# Patient Record
Sex: FEMALE | Race: WHITE | NOT HISPANIC OR LATINO | Employment: OTHER | URBAN - METROPOLITAN AREA
[De-identification: names, ages, dates, MRNs, and addresses within clinical notes are randomized per-mention and may not be internally consistent; named-entity substitution may affect disease eponyms.]

---

## 2017-10-04 ENCOUNTER — OFFICE VISIT (OUTPATIENT)
Dept: OBSTETRICS AND GYNECOLOGY | Facility: CLINIC | Age: 62
End: 2017-10-04
Attending: OBSTETRICS & GYNECOLOGY
Payer: COMMERCIAL

## 2017-10-04 VITALS — WEIGHT: 238.13 LBS | BODY MASS INDEX: 42.19 KG/M2 | HEIGHT: 63 IN

## 2017-10-04 DIAGNOSIS — Z01.419 ENCOUNTER FOR GYNECOLOGICAL EXAMINATION: ICD-10-CM

## 2017-10-04 DIAGNOSIS — Z12.39 BREAST CANCER SCREENING: Primary | ICD-10-CM

## 2017-10-04 DIAGNOSIS — N81.4 UTERINE PROLAPSE: ICD-10-CM

## 2017-10-04 DIAGNOSIS — N81.11 CYSTOCELE, MIDLINE: ICD-10-CM

## 2017-10-04 PROCEDURE — 99396 PREV VISIT EST AGE 40-64: CPT | Mod: S$GLB,,, | Performed by: OBSTETRICS & GYNECOLOGY

## 2017-10-04 PROCEDURE — 99999 PR PBB SHADOW E&M-EST. PATIENT-LVL III: CPT | Mod: PBBFAC,,, | Performed by: OBSTETRICS & GYNECOLOGY

## 2017-10-04 RX ORDER — MESALAMINE 1.2 G/1
2.4 TABLET, DELAYED RELEASE ORAL
COMMUNITY
Start: 2017-09-30

## 2017-10-04 RX ORDER — PRAVASTATIN SODIUM 20 MG/1
40 TABLET ORAL NIGHTLY
COMMUNITY
Start: 2017-09-30 | End: 2019-03-27

## 2017-10-04 RX ORDER — OMEPRAZOLE 20 MG/1
20 CAPSULE, DELAYED RELEASE ORAL DAILY
COMMUNITY
Start: 2017-09-30 | End: 2019-10-10

## 2017-10-08 PROBLEM — N81.11 CYSTOCELE, MIDLINE: Status: ACTIVE | Noted: 2017-10-08

## 2017-10-08 NOTE — PROGRESS NOTES
"CC: Well woman exam    Jenny Caro is a 62 y.o. female  presents for a well woman exam.  She is established.  LMP: No LMP recorded. Patient is postmenopausal..      Annual Exam, she reports seeing Dr. Chapman last year for surgical evaluation of uterovaginal prolapse with a cystocele.  Adela fully evaluated the patient with urodynamics.  He states that she is not a surgical candidate.  Patient states that she has not gotten any worse over the past year and for now we'll just continue to observe.     last pap  pap & hpv negative,   Last mammo  [facility] went back for u/s- benign  Last colonoscopy 4 years ago normal    Health Maintenance   Topic Date Due    Hepatitis C Screening  1955    Lipid Panel  1955    TETANUS VACCINE  1972    Mammogram  1994    Colonoscopy  2004    Zoster Vaccine  2014    Influenza Vaccine  2017    Pap Smear with HPV Cotest  2019         Past Medical History:   Diagnosis Date    Abnormal Pap smear of cervix     LEEP , mild dysplasia normal paps since    GERD (gastroesophageal reflux disease)     HPV in female     Hypertension     Ulcerative colitis        Past Surgical History:   Procedure Laterality Date    gastric sleeve  2015    LAPAROSCOPIC GASTRIC BANDING         OB History    Para Term  AB Living   2 2 2     2   SAB TAB Ectopic Multiple Live Births           2      # Outcome Date GA Lbr Guero/2nd Weight Sex Delivery Anes PTL Lv   2 Term      Vag-Spont      1 Term      Vag-Spont             Family History   Problem Relation Age of Onset    Diabetes Mother     Heart disease Father     Stroke Sister     Diabetes Sister        Social History   Substance Use Topics    Smoking status: Never Smoker    Smokeless tobacco: Never Used    Alcohol use No       Ht 5' 3" (1.6 m)   Wt 108 kg (238 lb 1.6 oz)   BMI 42.18 kg/m²       ROS:  GENERAL: Denies weight gain or weight loss. Feeling " well overall.   SKIN: Denies rash or lesions.   HEAD: Denies head injury or headache.   NODES: Denies enlarged lymph nodes.   CHEST: Denies chest pain or shortness of breath.   CARDIOVASCULAR: Denies palpitations or left sided chest pain.   ABDOMEN: No abdominal pain, constipation, diarrhea, nausea, vomiting or rectal bleeding.   URINARY: No frequency, dysuria, hematuria, or burning on urination.  PSYCHIATRIC: Denies depression, anxiety or mood swings.    Physical Exam:    APPEARANCE: Well nourished, well developed, in no acute distress.  AFFECT: WNL, alert and oriented x 3  SKIN: No acne or hirsutism  ABDOMEN: Soft.  No tenderness or masses.  No hepatosplenomegaly.  No hernias.  BREASTS: Symmetrical, no skin changes or visible lesions.  No palpable masses, nipple discharge bilaterally in left breast.    Right breast with palp 1cm cyst/ fibroadenoma seen on u/s. No LAD nor d/c.  PELVIC: Normal external genitalia without lesions.  Normal hair distribution.  Adequate perineal body, normal urethral meatus.     Vagina moist and well rugated without lesions or discharge.  Cervix pink, without lesions, discharge or tenderness.  Patient continues to have uterine prolapse with a cystocele.  Unchanged   from last year.    Bimanual exam shows uterus to be normal size, regular, mobile and nontender.  Adnexa without masses or tenderness.    EXTREMITIES: No edema.    ASSESSMENT AND PLAN  1. Breast cancer screening  Mammo Digital Screening Bilat with Tomosynthesis CAD    Mammo Digital Screening Bilat with Tomosynthesis CAD   2. Encounter for gynecological examination         Patient was counseled today on A.C.S. Pap guidelines and recommendations for yearly pelvic exams, mammograms and monthly self breast exams; to see her PCP for other health maintenance.     Return in about 1 year (around 10/4/2018).

## 2017-12-09 ENCOUNTER — OFFICE VISIT (OUTPATIENT)
Dept: URGENT CARE | Facility: CLINIC | Age: 62
End: 2017-12-09
Payer: COMMERCIAL

## 2017-12-09 VITALS
TEMPERATURE: 97 F | HEIGHT: 63 IN | HEART RATE: 77 BPM | RESPIRATION RATE: 16 BRPM | WEIGHT: 238 LBS | SYSTOLIC BLOOD PRESSURE: 134 MMHG | OXYGEN SATURATION: 97 % | DIASTOLIC BLOOD PRESSURE: 83 MMHG | BODY MASS INDEX: 42.17 KG/M2

## 2017-12-09 DIAGNOSIS — N39.0 URINARY TRACT INFECTION WITHOUT HEMATURIA, SITE UNSPECIFIED: Primary | ICD-10-CM

## 2017-12-09 DIAGNOSIS — R30.0 DYSURIA: ICD-10-CM

## 2017-12-09 LAB
BILIRUB UR QL STRIP: NEGATIVE
GLUCOSE UR QL STRIP: NEGATIVE
KETONES UR QL STRIP: NEGATIVE
LEUKOCYTE ESTERASE UR QL STRIP: POSITIVE
PH, POC UA: 5.5 (ref 5–8)
POC BLOOD, URINE: NEGATIVE
POC NITRATES, URINE: NEGATIVE
PROT UR QL STRIP: NEGATIVE
SP GR UR STRIP: 1.01 (ref 1–1.03)
UROBILINOGEN UR STRIP-ACNC: ABNORMAL (ref 0.1–1.1)

## 2017-12-09 PROCEDURE — 99203 OFFICE O/P NEW LOW 30 MIN: CPT | Mod: 25,S$GLB,, | Performed by: EMERGENCY MEDICINE

## 2017-12-09 PROCEDURE — 81003 URINALYSIS AUTO W/O SCOPE: CPT | Mod: QW,S$GLB,, | Performed by: EMERGENCY MEDICINE

## 2017-12-09 RX ORDER — NITROFURANTOIN 25; 75 MG/1; MG/1
100 CAPSULE ORAL 2 TIMES DAILY
Qty: 14 CAPSULE | Refills: 0 | Status: SHIPPED | OUTPATIENT
Start: 2017-12-09 | End: 2017-12-16

## 2017-12-09 NOTE — PROGRESS NOTES
Subjective:       Patient ID: Jenny Caro is a 62 y.o. female.    Vitals:    12/09/17 0947   BP: 134/83   Pulse: 77   Resp: 16   Temp: 97.2 °F (36.2 °C)       Chief Complaint: Urinary Tract Infection    Pt states urinary frequency, urgency, and burning x apprx 4 days. Pt is taking AZO.she has had UTIs in the past. She thought it may due to drinking too much diet coke but the sxs have persisted after she dropped drinking the diet coke. No fever No back pain      Urinary Tract Infection    This is a new problem. The current episode started in the past 7 days. The problem has been gradually improving. The patient is experiencing no pain. There has been no fever. Associated symptoms include frequency and urgency. Pertinent negatives include no chills, hematuria, nausea or vomiting. Treatments tried: AZO. The treatment provided mild relief. Her past medical history is significant for recurrent UTIs.     Review of Systems   Constitution: Negative for chills and fever.   Skin: Negative for itching.   Musculoskeletal: Negative for back pain.   Gastrointestinal: Negative for abdominal pain, nausea and vomiting.   Genitourinary: Positive for dysuria, frequency and urgency. Negative for genital sores, hematuria, missed menses and non-menstrual bleeding.       Objective:      Physical Exam   Constitutional: She is oriented to person, place, and time. She appears well-developed and well-nourished.   Morbid obesity   HENT:   Head: Normocephalic and atraumatic.   Right Ear: External ear normal.   Left Ear: External ear normal.   Nose: Nose normal. No nasal deformity. No epistaxis.   Mouth/Throat: Oropharynx is clear and moist and mucous membranes are normal.   Eyes: Conjunctivae, EOM and lids are normal. Pupils are equal, round, and reactive to light.   Neck: Trachea normal, normal range of motion and phonation normal. Neck supple.   Cardiovascular: Normal rate, regular rhythm, normal heart sounds and normal pulses.   "  Pulmonary/Chest: Effort normal and breath sounds normal.   Abdominal: Soft. Normal appearance and bowel sounds are normal. She exhibits no distension and no mass. There is no tenderness. There is no CVA tenderness.   Neurological: She is alert and oriented to person, place, and time.   Skin: Skin is warm, dry and intact.   Psychiatric: She has a normal mood and affect. Her speech is normal and behavior is normal. Cognition and memory are normal.   Nursing note and vitals reviewed.    /83   Pulse 77   Temp 97.2 °F (36.2 °C)   Resp 16   Ht 5' 3" (1.6 m)   Wt 108 kg (238 lb)   SpO2 97%   BMI 42.16 kg/m²    Office Visit on 12/09/2017   Component Date Value Ref Range Status    POC Blood, Urine 12/09/2017 Negative  Negative Final    POC Bilirubin, Urine 12/09/2017 Negative  Negative Final    POC Urobilinogen, Urine 12/09/2017 norm  0.1 - 1.1 Final    POC Ketones, Urine 12/09/2017 Negative  Negative Final    POC Protein, Urine 12/09/2017 Negative  Negative Final    POC Nitrates, Urine 12/09/2017 Negative  Negative Final    POC Glucose, Urine 12/09/2017 Negative  Negative Final    pH, UA 12/09/2017 5.5  5 - 8 Final    POC Specific Gravity, Urine 12/09/2017 1.015  1.003 - 1.029 Final    POC Leukocytes, Urine 12/09/2017 Positive* Negative Final     Assessment:       1. Urinary tract infection without hematuria, site unspecified    2. Dysuria        Plan:       Jenny was seen today for urinary tract infection.    Diagnoses and all orders for this visit:    Urinary tract infection without hematuria, site unspecified  -     Urine culture    Dysuria  -     POCT Urinalysis, Dipstick, Automated, W/O Scope    Other orders  -     nitrofurantoin, macrocrystal-monohydrate, (MACROBID) 100 MG capsule; Take 1 capsule (100 mg total) by mouth 2 (two) times daily.          "

## 2017-12-09 NOTE — PATIENT INSTRUCTIONS
"                                                                    UTI   If your condition worsens or fails to improve we recommend that you receive another evaluation at the ER immediately or contact your PCP to discuss your concerns or return here. You must understand that you've received an urgent care treatment only and that you may be released before all your medical problems are known or treated. You the patient will arrange for followup care as instructed.   If you had cultures done it will take 3-5 days to result. We will call you with the result.   If you are are female and on BCP use additional methods to prevent pregnancy while on the antibiotics and for one cycle after.   Cranberry juice may help. Get the 100% cranberry juice and mix 4 oz of juice with 4 oz of water and drink this 8 oz glass of liquid once a day.     Bladder Infection, Female (Adult)    Urine is normally doesn't have any bacteria in it. But bacteria can get into the urinary tract from the skin around the rectum. Or they can travel in the blood from elsewhere in the body. Once they are in your urinary tract, they can cause infection in the urethra (urethritis), the bladder (cystitis), or the kidneys (pyelonephritis).  The most common place for an infection is in the bladder. This is called a bladder infection. This is one of the most common infections in women. Most bladder infections are easily treated. They are not serious unless the infection spreads to the kidney.  The phrases "bladder infection," "UTI," and "cystitis" are often used to describe the same thing. But they are not always the same. Cystitis is an inflammation of the bladder. The most common cause of cystitis is an infection.  Symptoms  The infection causes inflammation in the urethra and bladder. This causes many of the symptoms. The most common symptoms of a bladder infection are:  · Pain or burning when urinating  · Having to urinate more often than usual  · Urgent " need to urinate  · Only a small amount of urine comes out  · Blood in urine  · Abdominal discomfort. This is usually in the lower abdomen above the pubic bone.  · Cloudy urine  · Strong- or bad-smelling urine  · Unable to urinate (urinary retention)  · Unable to hold urine in (urinary incontinence)  · Fever  · Loss of appetite  · Confusion (in older adults)  Causes  Bladder infections are not contagious. You can't get one from someone else, from a toilet seat, or from sharing a bath.  The most common cause of bladder infections is bacteria from the bowels. The bacteria get onto the skin around the opening of the urethra. From there, they can get into the urine and travel up to the bladder, causing inflammation and infection. This usually happens because of:  · Wiping improperly after urinating. Always wipe from front to back.  · Bowel incontinence  · Pregnancy  · Procedures such as having a catheter inserted  · Older age  · Not emptying your bladder. This can allow bacteria a chance to grow in your urine.  · Dehydration  · Constipation  · Sex  · Use of a diaphragm for birth control   Treatment  Bladder infections are diagnosed by a urine test. They are treated with antibiotics and usually clear up quickly without complications. Treatment helps prevent a more serious kidney infection.  Medicines  Medicines can help in the treatment of a bladder infection:  · Take antibiotics until they are used up, even if you feel better. It is important to finish them to make sure the infection has cleared.  · You can use acetaminophen or ibuprofen for pain, fever, or discomfort, unless another medicine was prescribed. If you have chronic liver or kidney disease, talk with your healthcare provider before using these medicines. Also talk with your provider if you've ever had a stomach ulcer or gastrointestinal bleeding, or are taking blood-thinner medicines.  · If you are given phenazopydridine to reduce burning with urination, it  will cause your urine to become a bright orange color. This can stain clothing.  Care and prevention  These self-care steps can help prevent future infections:  · Drink plenty of fluids to prevent dehydration and flush out your bladder. Do this unless you must restrict fluids for other health reasons, or your doctor told you not to.  · Proper cleaning after going to the bathroom is important. Wipe from front to back after using the toilet to prevent the spread of bacteria.  · Urinate more often. Don't try to hold urine in for a long time.  · Wear loose-fitting clothes and cotton underwear. Avoid tight-fitting pants.  · Improve your diet and prevent constipation. Eat more fresh fruit and vegetables, and fiber, and less junk and fatty foods.  · Avoid sex until your symptoms are gone.  · Avoid caffeine, alcohol, and spicy foods. These can irritate your bladder.  · Urinate right after intercourse to flush out your bladder.  · If you use birth control pills and have frequent bladder infections, discuss it with your doctor.  Follow-up care  Call your healthcare provider if all symptoms are not gone after 3 days of treatment. This is especially important if you have repeat infections.  If a culture was done, you will be told if your treatment needs to be changed. If directed, you can call to find out the results.  If X-rays were done, you will be told if the results will affect your treatment.  Call 911  Call 911 if any of the following occur:  · Trouble breathing  · Hard to wake up or confusion  · Fainting or loss of consciousness  · Rapid heart rate  When to seek medical advice  Call your healthcare provider right away if any of these occur:  · Fever of 100.4ºF (38.0ºC) or higher, or as directed by your healthcare provider  · Symptoms are not better by the third day of treatment  · Back or belly (abdominal) pain that gets worse  · Repeated vomiting, or unable to keep medicine down  · Weakness or dizziness  · Vaginal  discharge  · Pain, redness, or swelling in the outer vaginal area (labia)  Date Last Reviewed: 10/1/2016  © 4720-7183 [employer]. [address] [place] [address], [place] [address]. All rights reserved. This information is not intended as a substitute for professional medical care. Always follow your healthcare professional's instructions.

## 2017-12-13 LAB
BACTERIA UR CULT: ABNORMAL
BACTERIA UR CULT: ABNORMAL
OTHER ANTIBIOTIC SUSC ISLT: ABNORMAL

## 2017-12-15 ENCOUNTER — TELEPHONE (OUTPATIENT)
Dept: URGENT CARE | Facility: CLINIC | Age: 62
End: 2017-12-15

## 2017-12-16 NOTE — TELEPHONE ENCOUNTER
----- Message from Tuyet Hayes MD sent at 12/13/2017  9:45 AM CST -----  Notify the patient that their  lab tests did result abnormal. She is on Macrobid.However they  have already been given the appropriate medication and should finish the meds as prescribed

## 2018-05-22 ENCOUNTER — OFFICE VISIT (OUTPATIENT)
Dept: URGENT CARE | Facility: CLINIC | Age: 63
End: 2018-05-22
Payer: COMMERCIAL

## 2018-05-22 VITALS
RESPIRATION RATE: 20 BRPM | HEART RATE: 82 BPM | WEIGHT: 240 LBS | HEIGHT: 63 IN | TEMPERATURE: 98 F | SYSTOLIC BLOOD PRESSURE: 117 MMHG | DIASTOLIC BLOOD PRESSURE: 82 MMHG | OXYGEN SATURATION: 96 % | BODY MASS INDEX: 42.52 KG/M2

## 2018-05-22 DIAGNOSIS — R30.0 DYSURIA: Primary | ICD-10-CM

## 2018-05-22 DIAGNOSIS — R52 PAIN: ICD-10-CM

## 2018-05-22 LAB
BILIRUB UR QL STRIP: NEGATIVE
GLUCOSE UR QL STRIP: NEGATIVE
KETONES UR QL STRIP: NEGATIVE
LEUKOCYTE ESTERASE UR QL STRIP: NEGATIVE
PH, POC UA: 5.5 (ref 5–8)
POC BLOOD, URINE: NEGATIVE
POC NITRATES, URINE: NEGATIVE
PROT UR QL STRIP: NEGATIVE
SP GR UR STRIP: 1.01 (ref 1–1.03)
UROBILINOGEN UR STRIP-ACNC: NORMAL (ref 0.1–1.1)

## 2018-05-22 PROCEDURE — 3008F BODY MASS INDEX DOCD: CPT | Mod: CPTII,S$GLB,, | Performed by: NURSE PRACTITIONER

## 2018-05-22 PROCEDURE — 3074F SYST BP LT 130 MM HG: CPT | Mod: CPTII,S$GLB,, | Performed by: NURSE PRACTITIONER

## 2018-05-22 PROCEDURE — 3079F DIAST BP 80-89 MM HG: CPT | Mod: CPTII,S$GLB,, | Performed by: NURSE PRACTITIONER

## 2018-05-22 PROCEDURE — 81003 URINALYSIS AUTO W/O SCOPE: CPT | Mod: QW,S$GLB,, | Performed by: NURSE PRACTITIONER

## 2018-05-22 PROCEDURE — 99214 OFFICE O/P EST MOD 30 MIN: CPT | Mod: 25,S$GLB,, | Performed by: NURSE PRACTITIONER

## 2018-05-22 NOTE — PATIENT INSTRUCTIONS
"  Your urine test was normal today. A urine culture was sent for further evaluation. We will call with your results and giver further instructions.  Please return here or go to the Emergency Department for any concerns or worsening of condition.  Please follow up with your primary care doctor or specialist as needed.          Dysuria     Painful urination (dysuria) is often caused by a problem in the urinary tract.   Dysuria is pain felt during urination. It is often described as a burning. Learn more about this problem and how it can be treated.  What causes dysuria?  Possible causes include:  · Infection with a bacteria or virus such as a urinary tract infection (UTI or a sexually transmitted infection (STI)  · Sensitivity or allergy to chemicals such as those found in lotions and other products  · Prostate or bladder problems  · Radiation therapy to the pelvic area  How is dysuria diagnosed?  Your healthcare provider will examine you. He or she will ask about your symptoms and health. After talking with you and doing a physical exam, your healthcare provider may know what is causing your dysuria. He or she will usually request  a sample of your urine. Tests of your urine, or a "urinalysis," are done. A urinalysis may include:  · Looking at the urine sample (visual exam)  · Checking for substances (chemical exam)  · Looking at a small amount under a microscope (microscopic exam)  Some parts of the urinalysis may be done in the provider's office and some in a lab. And, the urine sample may be checked for bacteria and yeast (urine culture). Your healthcare provider will tell you more about these tests if they are needed.  How is dysuria treated?  Treatment depends on the cause. If you have a bacterial infection, you may need antibiotics. You may be given medicines to make it easier for you to urinate and help relieve pain. Your healthcare provider can tell you more about your treatment options. Untreated, symptoms " may get worse.  When to call your healthcare provider  Call the healthcare provider right away if you have any of the following:  · Fever of 100.4°F (38°C) or higher   · No improvement after three days of treatment  · Trouble urinating because of pain  · New or increased discharge from the vagina or penis  · Rash or joint pain  · Increased back or abdominal pain  · Enlarged painful lymph nodes (lumps) in the groin   Date Last Reviewed: 1/1/2017 © 2000-2017 [employer]. [address] [place] [address]. All rights reserved. This information is not intended as a substitute for professional medical care. Always follow your healthcare professional's instructions.

## 2018-05-22 NOTE — PROGRESS NOTES
"Subjective:       Patient ID: Jenny Caro is a 62 y.o. female.    Vitals:  height is 5' 3" (1.6 m) and weight is 108.9 kg (240 lb). Her oral temperature is 97.9 °F (36.6 °C). Her blood pressure is 117/82 and her pulse is 82. Her respiration is 20 and oxygen saturation is 96%.     Chief Complaint: Urinary Tract Infection    Onset of symptoms 3 weeks ago. Began with concentrated, malodorous urine. She states the symptoms have improved, especially with increased intake of water. Describes "burning" with urination. Denies hematuria. Denies vaginal pain or discharge. No vaginal lesions or rashes. Denies back or flank pain. No n/v/f/c. No other associated symptoms to report. Reports frequent UTIs in the past. Wears pads d/t ulcerative colitis and attributes this to be a cause.      Urinary Tract Infection    This is a new problem. The current episode started 1 to 4 weeks ago. The problem occurs intermittently. The problem has been gradually improving. The quality of the pain is described as burning. The pain is at a severity of 2/10. The pain is mild. There has been no fever. The fever has been present for less than 1 day. She is sexually active (not recently d/t 's recent surgery). There is no history of pyelonephritis. Associated symptoms include frequency and urgency. Pertinent negatives include no chills, discharge, flank pain, hematuria, nausea, vomiting or rash. Treatments tried: AZO. The treatment provided no relief. Her past medical history is significant for recurrent UTIs. There is no history of STD.     Review of Systems   Constitution: Negative for chills and fever.   Skin: Negative for itching and rash.   Musculoskeletal: Negative for back pain and myalgias.   Gastrointestinal: Negative for abdominal pain, nausea and vomiting.   Genitourinary: Positive for dysuria, frequency and urgency. Negative for flank pain, genital sores, hematuria, missed menses, non-menstrual bleeding and pelvic pain.   All " other systems reviewed and are negative.      Objective:      Physical Exam   Constitutional: She is oriented to person, place, and time. She appears well-developed and well-nourished.   HENT:   Head: Normocephalic and atraumatic.   Right Ear: External ear normal.   Left Ear: External ear normal.   Nose: Nose normal. No nasal deformity. No epistaxis.   Mouth/Throat: Oropharynx is clear and moist and mucous membranes are normal.   Eyes: Conjunctivae and lids are normal.   Neck: Trachea normal, normal range of motion and phonation normal. Neck supple.   Cardiovascular: Normal rate, regular rhythm, normal heart sounds and normal pulses.    Pulmonary/Chest: Effort normal and breath sounds normal.   Abdominal: Soft. Normal appearance and bowel sounds are normal. She exhibits no distension and no mass. There is no tenderness. There is no CVA tenderness.   Genitourinary:   Genitourinary Comments: Exam deferred. She denies rashes, lesions, bleeding or discharge.   Neurological: She is alert and oriented to person, place, and time.   Skin: Skin is warm, dry and intact. No rash noted.   Psychiatric: She has a normal mood and affect. Her speech is normal and behavior is normal. Cognition and memory are normal.   Nursing note and vitals reviewed.      Results for orders placed or performed in visit on 05/22/18   POCT Urinalysis, Dipstick, Automated, W/O Scope   Result Value Ref Range    POC Blood, Urine Negative Negative    POC Bilirubin, Urine Negative Negative    POC Urobilinogen, Urine norm 0.1 - 1.1    POC Ketones, Urine Negative Negative    POC Protein, Urine Negative Negative    POC Nitrates, Urine Negative Negative    POC Glucose, Urine Negative Negative    pH, UA 5.5 5 - 8    POC Specific Gravity, Urine 1.015 1.003 - 1.029    POC Leukocytes, Urine Negative Negative       Assessment:       1. Dysuria    2. Pain        Plan:         Dysuria  -     POCT Urinalysis, Dipstick, Automated, W/O Scope  -     CULTURE,  "URINE    Pain    Pt states d/t her frequent antibiotic use in the past and improvement of symptoms, she would like to wait for culture reports before receiving any medications today. She has pyridium at home she can use. No other treatment needed today. Return precautions given.      Patient Instructions       Your urine test was normal today. A urine culture was sent for further evaluation. We will call with your results and giver further instructions.  Please return here or go to the Emergency Department for any concerns or worsening of condition.  Please follow up with your primary care doctor or specialist as needed.          Dysuria     Painful urination (dysuria) is often caused by a problem in the urinary tract.   Dysuria is pain felt during urination. It is often described as a burning. Learn more about this problem and how it can be treated.  What causes dysuria?  Possible causes include:  · Infection with a bacteria or virus such as a urinary tract infection (UTI or a sexually transmitted infection (STI)  · Sensitivity or allergy to chemicals such as those found in lotions and other products  · Prostate or bladder problems  · Radiation therapy to the pelvic area  How is dysuria diagnosed?  Your healthcare provider will examine you. He or she will ask about your symptoms and health. After talking with you and doing a physical exam, your healthcare provider may know what is causing your dysuria. He or she will usually request  a sample of your urine. Tests of your urine, or a "urinalysis," are done. A urinalysis may include:  · Looking at the urine sample (visual exam)  · Checking for substances (chemical exam)  · Looking at a small amount under a microscope (microscopic exam)  Some parts of the urinalysis may be done in the provider's office and some in a lab. And, the urine sample may be checked for bacteria and yeast (urine culture). Your healthcare provider will tell you more about these tests if they " are needed.  How is dysuria treated?  Treatment depends on the cause. If you have a bacterial infection, you may need antibiotics. You may be given medicines to make it easier for you to urinate and help relieve pain. Your healthcare provider can tell you more about your treatment options. Untreated, symptoms may get worse.  When to call your healthcare provider  Call the healthcare provider right away if you have any of the following:  · Fever of 100.4°F (38°C) or higher   · No improvement after three days of treatment  · Trouble urinating because of pain  · New or increased discharge from the vagina or penis  · Rash or joint pain  · Increased back or abdominal pain  · Enlarged painful lymph nodes (lumps) in the groin   Date Last Reviewed: 1/1/2017  © 0535-1570 [employer]. [address] [place] [address], [place] [address]. All rights reserved. This information is not intended as a substitute for professional medical care. Always follow your healthcare professional's instructions.

## 2018-05-28 ENCOUNTER — TELEPHONE (OUTPATIENT)
Dept: URGENT CARE | Facility: CLINIC | Age: 63
End: 2018-05-28

## 2018-05-28 LAB
BACTERIA UR CULT: ABNORMAL
BACTERIA UR CULT: ABNORMAL
OTHER ANTIBIOTIC SUSC ISLT: ABNORMAL

## 2018-05-29 ENCOUNTER — TELEPHONE (OUTPATIENT)
Dept: URGENT CARE | Facility: CLINIC | Age: 63
End: 2018-05-29

## 2018-05-29 NOTE — TELEPHONE ENCOUNTER
Spoke with pt. She is still having sxs. No fever No vomiting. It is Klebsiella and she is allergic to cipro and Bactrim. Will phone in Macrobid  [facility]  [address] [phone]

## 2018-06-29 ENCOUNTER — OFFICE VISIT (OUTPATIENT)
Dept: URGENT CARE | Facility: CLINIC | Age: 63
End: 2018-06-29
Payer: COMMERCIAL

## 2018-06-29 VITALS
HEART RATE: 80 BPM | BODY MASS INDEX: 42.52 KG/M2 | DIASTOLIC BLOOD PRESSURE: 88 MMHG | OXYGEN SATURATION: 98 % | SYSTOLIC BLOOD PRESSURE: 141 MMHG | TEMPERATURE: 98 F | HEIGHT: 63 IN | WEIGHT: 240 LBS | RESPIRATION RATE: 20 BRPM

## 2018-06-29 DIAGNOSIS — R30.0 DYSURIA: Primary | ICD-10-CM

## 2018-06-29 DIAGNOSIS — N30.00 ACUTE CYSTITIS WITHOUT HEMATURIA: ICD-10-CM

## 2018-06-29 LAB
BILIRUB UR QL STRIP: NEGATIVE
GLUCOSE UR QL STRIP: NEGATIVE
KETONES UR QL STRIP: NEGATIVE
LEUKOCYTE ESTERASE UR QL STRIP: POSITIVE
PH, POC UA: 5 (ref 5–8)
POC BLOOD, URINE: NEGATIVE
POC NITRATES, URINE: NEGATIVE
PROT UR QL STRIP: NEGATIVE
SP GR UR STRIP: 1.02 (ref 1–1.03)
UROBILINOGEN UR STRIP-ACNC: NORMAL (ref 0.1–1.1)

## 2018-06-29 PROCEDURE — 81003 URINALYSIS AUTO W/O SCOPE: CPT | Mod: QW,S$GLB,, | Performed by: EMERGENCY MEDICINE

## 2018-06-29 PROCEDURE — 3008F BODY MASS INDEX DOCD: CPT | Mod: CPTII,S$GLB,, | Performed by: EMERGENCY MEDICINE

## 2018-06-29 PROCEDURE — 3079F DIAST BP 80-89 MM HG: CPT | Mod: CPTII,S$GLB,, | Performed by: EMERGENCY MEDICINE

## 2018-06-29 PROCEDURE — 99214 OFFICE O/P EST MOD 30 MIN: CPT | Mod: 25,S$GLB,, | Performed by: EMERGENCY MEDICINE

## 2018-06-29 PROCEDURE — 3077F SYST BP >= 140 MM HG: CPT | Mod: CPTII,S$GLB,, | Performed by: EMERGENCY MEDICINE

## 2018-06-29 RX ORDER — NITROFURANTOIN 25; 75 MG/1; MG/1
100 CAPSULE ORAL 2 TIMES DAILY
Qty: 14 CAPSULE | Refills: 0 | Status: SHIPPED | OUTPATIENT
Start: 2018-06-29 | End: 2018-07-06

## 2018-06-29 NOTE — PROGRESS NOTES
"Subjective:       Patient ID: Jenny Caro is a 62 y.o. female.    Vitals:  height is 5' 3" (1.6 m) and weight is 108.9 kg (240 lb). Her temperature is 98.1 °F (36.7 °C). Her blood pressure is 141/88 (abnormal) and her pulse is 80. Her respiration is 20 and oxygen saturation is 98%.     Chief Complaint: Urinary Tract Infection    Pt with UTI at the end of may that was Klebsiella. She is allergic to bactrim and cipro and was given macrobid as it was sensitive to macrobid. She said she got better and then the sxs returned this week. She says she has urinary stress incontinence and ulcerative colitis and loose stools and she thinks that is the predisposing factor. She does get frequent UTIs but usually not this frequent. She says the sxs are mild but she knows when it is not right.      Urinary Tract Infection    This is a new problem. The current episode started in the past 7 days. The problem occurs intermittently. The problem has been unchanged. The quality of the pain is described as burning. The pain is at a severity of 0/10. The patient is experiencing no pain. There has been no fever. The fever has been present for less than 1 day. She is sexually active. There is no history of pyelonephritis. Pertinent negatives include no chills, hematuria, nausea, urgency or vomiting. Treatments tried: AZO. The treatment provided mild relief.     Review of Systems   Constitution: Negative for chills and fever.   Skin: Negative for itching.   Musculoskeletal: Negative for back pain.   Gastrointestinal: Negative for abdominal pain, nausea and vomiting.   Genitourinary: Positive for dysuria. Negative for genital sores, hematuria, missed menses, non-menstrual bleeding and urgency.       Objective:      Physical Exam   Constitutional: She is oriented to person, place, and time. She appears well-developed and well-nourished.   obese   HENT:   Head: Normocephalic and atraumatic.   Right Ear: External ear normal.   Left Ear: " External ear normal.   Nose: Nose normal. No nasal deformity. No epistaxis.   Mouth/Throat: Oropharynx is clear and moist and mucous membranes are normal.   Eyes: Conjunctivae and lids are normal.   Neck: Trachea normal, normal range of motion and phonation normal. Neck supple.   Cardiovascular: Normal rate, regular rhythm, normal heart sounds and normal pulses.    Pulmonary/Chest: Effort normal and breath sounds normal.   Abdominal: Soft. Normal appearance and bowel sounds are normal. She exhibits no distension and no mass. There is no tenderness. There is no CVA tenderness.   Neurological: She is alert and oriented to person, place, and time.   Skin: Skin is warm, dry and intact.   Psychiatric: She has a normal mood and affect. Her speech is normal and behavior is normal. Cognition and memory are normal.   Nursing note and vitals reviewed.      Office Visit on 06/29/2018   Component Date Value Ref Range Status    POC Blood, Urine 06/29/2018 Negative  Negative Final    POC Bilirubin, Urine 06/29/2018 Negative  Negative Final    POC Urobilinogen, Urine 06/29/2018 Normal  0.1 - 1.1 Final    POC Ketones, Urine 06/29/2018 Negative  Negative Final    POC Protein, Urine 06/29/2018 Negative  Negative Final    POC Nitrates, Urine 06/29/2018 Negative  Negative Final    POC Glucose, Urine 06/29/2018 Negative  Negative Final    pH, UA 06/29/2018 5.0  5 - 8 Final    POC Specific Gravity, Urine 06/29/2018 1.020  1.003 - 1.029 Final    POC Leukocytes, Urine 06/29/2018 Positive* Negative Final     Assessment:       1. Dysuria    2. Acute cystitis without hematuria        Plan:         Dysuria  -     POCT Urinalysis, Dipstick, Automated, W/O Scope    Acute cystitis without hematuria  -     Urine culture    Other orders  -     nitrofurantoin, macrocrystal-monohydrate, (MACROBID) 100 MG capsule; Take 1 capsule (100 mg total) by mouth 2 (two) times daily. for 7 days  Dispense: 14 capsule; Refill: 0          Patient  Instructions                                                                         UTI   If your condition worsens or fails to improve we recommend that you receive another evaluation at the ER immediately or contact your PCP to discuss your concerns or return here. You must understand that you've received an urgent care treatment only and that you may be released before all your medical problems are known or treated. You the patient will arrange for followup care as instructed.   If you had cultures done it will take 3-5 days to result. We will call you with the result.   If you are are female and on BCP use additional methods to prevent pregnancy while on the antibiotics and for one cycle after.   Cranberry juice may help. Get the 100% cranberry juice and mix 4 oz of juice with 4 oz of water and drink this 8 oz glass of liquid once a day.     Understanding Urinary Tract Infections (UTIs)  Most UTIs are caused by bacteria, although they may also be caused by viruses or fungi. Bacteria from the bowel are the most common source of infection. The infection may start because of any of the following:  · Sexual activity. During sex, bacteria can travel from the penis, vagina, or rectum into the urethra.   · Bacteria on the skin outside the rectum may travel into the urethra. This is more common in women since the rectum and urethra are closer to each other than in men. Wiping from front to back after using the toilet and keeping the area clean can help prevent germs from getting to the urethra.  · Blockage of urine flow through the urinary tract. If urine sits too long, germs may start to grow out of control.      Parts of the urinary tract  The infection can occur in any part of the urinary tract.  · The kidneys collect and store urine.  · The ureters carry urine from the kidneys to the bladder.  · The bladder holds urine until you are ready to let it out.  · The urethra carries urine from the bladder out of the body.  It is shorter in women, so bacteria can move through it more easily. The urethra is longer in men, so a UTI is less likely to reach the bladder or kidneys in men.  Date Last Reviewed: 1/1/2017  © 8847-2436 [employer]. [address] [place] [address]. All rights reserved. This information is not intended as a substitute for professional medical care. Always follow your healthcare professional's instructions.

## 2018-06-29 NOTE — PATIENT INSTRUCTIONS
UTI   If your condition worsens or fails to improve we recommend that you receive another evaluation at the ER immediately or contact your PCP to discuss your concerns or return here. You must understand that you've received an urgent care treatment only and that you may be released before all your medical problems are known or treated. You the patient will arrange for followup care as instructed.   If you had cultures done it will take 3-5 days to result. We will call you with the result.   If you are are female and on BCP use additional methods to prevent pregnancy while on the antibiotics and for one cycle after.   Cranberry juice may help. Get the 100% cranberry juice and mix 4 oz of juice with 4 oz of water and drink this 8 oz glass of liquid once a day.     Understanding Urinary Tract Infections (UTIs)  Most UTIs are caused by bacteria, although they may also be caused by viruses or fungi. Bacteria from the bowel are the most common source of infection. The infection may start because of any of the following:  · Sexual activity. During sex, bacteria can travel from the penis, vagina, or rectum into the urethra.   · Bacteria on the skin outside the rectum may travel into the urethra. This is more common in women since the rectum and urethra are closer to each other than in men. Wiping from front to back after using the toilet and keeping the area clean can help prevent germs from getting to the urethra.  · Blockage of urine flow through the urinary tract. If urine sits too long, germs may start to grow out of control.      Parts of the urinary tract  The infection can occur in any part of the urinary tract.  · The kidneys collect and store urine.  · The ureters carry urine from the kidneys to the bladder.  · The bladder holds urine until you are ready to let it out.  · The urethra carries urine from the bladder out of the body. It is shorter in  women, so bacteria can move through it more easily. The urethra is longer in men, so a UTI is less likely to reach the bladder or kidneys in men.  Date Last Reviewed: 1/1/2017  © 1560-6483 [employer] [address] [place] [address], [place] [address]. All rights reserved. This information is not intended as a substitute for professional medical care. Always follow your healthcare professional's instructions.

## 2018-07-06 ENCOUNTER — TELEPHONE (OUTPATIENT)
Dept: URGENT CARE | Facility: CLINIC | Age: 63
End: 2018-07-06

## 2018-07-06 LAB
BACTERIA UR CULT: ABNORMAL
BACTERIA UR CULT: ABNORMAL
OTHER ANTIBIOTIC SUSC ISLT: ABNORMAL

## 2018-07-06 NOTE — TELEPHONE ENCOUNTER
Spoke with patient All her sxs are gone. The Enterobacter was only intermediate sensitivity to macrobid. She can't take bactrim or cipro. Could use tetracycline. However she is better will hold off for now. She was given precautions if sxs return, fever, back pain or vomiting she needs to be rechecked right away

## 2018-08-25 ENCOUNTER — OFFICE VISIT (OUTPATIENT)
Dept: URGENT CARE | Facility: CLINIC | Age: 63
End: 2018-08-25
Payer: COMMERCIAL

## 2018-08-25 VITALS
HEIGHT: 63 IN | BODY MASS INDEX: 42.52 KG/M2 | DIASTOLIC BLOOD PRESSURE: 86 MMHG | WEIGHT: 240 LBS | HEART RATE: 80 BPM | OXYGEN SATURATION: 97 % | SYSTOLIC BLOOD PRESSURE: 138 MMHG | TEMPERATURE: 98 F

## 2018-08-25 DIAGNOSIS — R06.03 RESPIRATORY DIFFICULTY: Primary | ICD-10-CM

## 2018-08-25 PROCEDURE — 99214 OFFICE O/P EST MOD 30 MIN: CPT | Mod: 25,S$GLB,, | Performed by: NURSE PRACTITIONER

## 2018-08-25 PROCEDURE — 3008F BODY MASS INDEX DOCD: CPT | Mod: CPTII,S$GLB,, | Performed by: NURSE PRACTITIONER

## 2018-08-25 PROCEDURE — 3079F DIAST BP 80-89 MM HG: CPT | Mod: CPTII,S$GLB,, | Performed by: NURSE PRACTITIONER

## 2018-08-25 PROCEDURE — 3075F SYST BP GE 130 - 139MM HG: CPT | Mod: CPTII,S$GLB,, | Performed by: NURSE PRACTITIONER

## 2018-08-25 PROCEDURE — 94640 AIRWAY INHALATION TREATMENT: CPT | Mod: S$GLB,,, | Performed by: NURSE PRACTITIONER

## 2018-08-25 RX ORDER — LEVALBUTEROL INHALATION SOLUTION 1.25 MG/3ML
1.25 SOLUTION RESPIRATORY (INHALATION)
Status: COMPLETED | OUTPATIENT
Start: 2018-08-25 | End: 2018-08-25

## 2018-08-25 RX ORDER — ALBUTEROL SULFATE 90 UG/1
2 AEROSOL, METERED RESPIRATORY (INHALATION) EVERY 6 HOURS PRN
Qty: 18 G | Refills: 0 | Status: SHIPPED | OUTPATIENT
Start: 2018-08-25 | End: 2018-11-14 | Stop reason: ALTCHOICE

## 2018-08-25 RX ADMIN — LEVALBUTEROL INHALATION SOLUTION 1.25 MG: 1.25 SOLUTION RESPIRATORY (INHALATION) at 03:08

## 2018-08-25 NOTE — PATIENT INSTRUCTIONS
Chronic Lung Disease: Avoiding Irritants and Allergens    Many people with chronic lung disease, such as asthma or COPD, need to avoid irritants that can trigger symptoms making it more difficult to breathe. Irritants are certain substances in the air that irritate the airways. Some people are also sensitive to certain allergens. These are substances that cause inflammation in the lungs. Allergens can also cause runny nose, or itchy, watery eyes. You probably cant avoid all these things, all the time. But youll most likely breathe better if you stay away from the substances that bother you.   Try to avoid  Smoke. This includes cigarettes, cigars, pipes, and fireplaces.  · Dont smoke. And dont allow anyone else to smoke near you or in your home.  · Ask for smoke-free hotel rooms and rental cars.  · Make sure fireplaces and wood stoves are well ventilated, and sit well away from them.  Smog. This is made up of car exhaust and other air pollutants.  · Read or listen to local air quality reports. These let you know when air quality is poor.  · Stay indoors as much as you can on smoggy days.  Strong odors. These include scented room fresheners, mothballs, and insect sprays. Perfume and cooking can be other causes of strong odors.  · Avoid using bleach and ammonia for cleaning.  · Use scent-free deodorant, lotion, and other products.  Other irritants. These include dust, aerosol sprays, and fine powders.  · Wear a mask while doing tasks like dusting, sweeping, and yard work.  Cold weather. This can make breathing more difficult.  · Protect your lungs by wearing a scarf over your nose and mouth.   You may also need to avoid  If you have allergies, you should try to avoid the allergens that cause them. Ask your healthcare provider if you need to avoid any of these:  Pollen. This is a fine powder made by trees, grasses, and weeds.  · Try to learn what types of pollen affect you the most. Pollen levels vary during the  year.  · Avoid outdoor activities when pollen levels are high. Use air conditioning instead of opening the windows in your home and car.  Animal dander. This is shed by animals with fur or feathers. The particles can float through the air and stick to carpet, clothing, and furniture.  · Wash your hands and clothes after handling pets.  Dust mites. These are tiny bugs too small to see. They live in mattresses, bedding, carpets, curtains, and indoor dust.  · Wash bedding in hot water (130°F/54.4°C) each week.  · Cover mattresses and pillows with special mite-proof cases.  Mold. This grows in damp places, such as bathrooms, basements, and closets.  · Run an exhaust fan while bathing. Or, leave a window open in the bathroom.  · Use a dehumidifier in damp areas.  Date Last Reviewed: 5/1/2016  © 9751-5874 [employer]. [address] [place] [address], [place] [address]. All rights reserved. This information is not intended as a substitute for professional medical care. Always follow your healthcare professional's instructions.

## 2018-08-25 NOTE — PROGRESS NOTES
"Subjective:       Patient ID: Jenny Caro is a 63 y.o. female.    Vitals:  height is 5' 3" (1.6 m) and weight is 108.9 kg (240 lb). Her oral temperature is 97.6 °F (36.4 °C). Her blood pressure is 138/86 and her pulse is 80. Her oxygen saturation is 97%.     Chief Complaint: URI    2 wks ago cleaned a relatives apartment: around lots of dust, bugs, and cleaning products. Chest tightness and, cough, mucus production.patient states she breathed in [employer] oven ;      URI    This is a new problem. The current episode started 1 to 4 weeks ago. The problem has been gradually worsening. There has been no fever. Associated symptoms include congestion, coughing and a sore throat. Pertinent negatives include no abdominal pain, chest pain, ear pain, headaches, nausea or wheezing. She has tried antihistamine for the symptoms. The treatment provided no relief.     Review of Systems   Constitution: Negative for chills, fever and malaise/fatigue.   HENT: Positive for congestion and sore throat. Negative for ear pain and hoarse voice.    Eyes: Negative for discharge and redness.   Cardiovascular: Negative for chest pain, dyspnea on exertion and leg swelling.   Respiratory: Positive for cough and sputum production. Negative for shortness of breath and wheezing.    Musculoskeletal: Negative for myalgias.   Gastrointestinal: Negative for abdominal pain and nausea.   Neurological: Negative for headaches.       Objective:      Physical Exam   Constitutional: She is oriented to person, place, and time. She appears well-developed and well-nourished. No distress.   HENT:   Head: Normocephalic and atraumatic.   Right Ear: External ear normal.   Left Ear: External ear normal.   Nose: Nose normal.   Mouth/Throat: Oropharynx is clear and moist. No oropharyngeal exudate.   Eyes: Conjunctivae and EOM are normal. Pupils are equal, round, and reactive to light. Right eye exhibits no discharge. Left eye exhibits no discharge.   Neck: " Normal range of motion. Neck supple. No JVD present. No tracheal deviation present. No thyromegaly present.   Cardiovascular: Normal rate, regular rhythm, normal heart sounds and intact distal pulses. Exam reveals no gallop and no friction rub.   No murmur heard.  Pulmonary/Chest: Effort normal and breath sounds normal. No stridor. No respiratory distress. She has no wheezes. She has no rales. She exhibits no tenderness.   Abdominal: Soft.   Musculoskeletal: Normal range of motion. She exhibits no edema, tenderness or deformity.   Neurological: She is alert and oriented to person, place, and time. She displays normal reflexes. No cranial nerve deficit or sensory deficit. She exhibits normal muscle tone. Coordination normal.   Skin: Skin is warm and dry. She is not diaphoretic.   Psychiatric: She has a normal mood and affect. Her behavior is normal. Judgment and thought content normal.       Assessment:       1. Respiratory difficulty        Plan:     Inhaler as ordered;     Patient Instructions     Chronic Lung Disease: Avoiding Irritants and Allergens    Many people with chronic lung disease, such as asthma or COPD, need to avoid irritants that can trigger symptoms making it more difficult to breathe. Irritants are certain substances in the air that irritate the airways. Some people are also sensitive to certain allergens. These are substances that cause inflammation in the lungs. Allergens can also cause runny nose, or itchy, watery eyes. You probably cant avoid all these things, all the time. But youll most likely breathe better if you stay away from the substances that bother you.   Try to avoid  Smoke. This includes cigarettes, cigars, pipes, and fireplaces.  · Dont smoke. And dont allow anyone else to smoke near you or in your home.  · Ask for smoke-free hotel rooms and rental cars.  · Make sure fireplaces and wood stoves are well ventilated, and sit well away from them.  Smog. This is made up of car  exhaust and other air pollutants.  · Read or listen to local air quality reports. These let you know when air quality is poor.  · Stay indoors as much as you can on smoggy days.  Strong odors. These include scented room fresheners, mothballs, and insect sprays. Perfume and cooking can be other causes of strong odors.  · Avoid using bleach and ammonia for cleaning.  · Use scent-free deodorant, lotion, and other products.  Other irritants. These include dust, aerosol sprays, and fine powders.  · Wear a mask while doing tasks like dusting, sweeping, and yard work.  Cold weather. This can make breathing more difficult.  · Protect your lungs by wearing a scarf over your nose and mouth.   You may also need to avoid  If you have allergies, you should try to avoid the allergens that cause them. Ask your healthcare provider if you need to avoid any of these:  Pollen. This is a fine powder made by trees, grasses, and weeds.  · Try to learn what types of pollen affect you the most. Pollen levels vary during the year.  · Avoid outdoor activities when pollen levels are high. Use air conditioning instead of opening the windows in your home and car.  Animal dander. This is shed by animals with fur or feathers. The particles can float through the air and stick to carpet, clothing, and furniture.  · Wash your hands and clothes after handling pets.  Dust mites. These are tiny bugs too small to see. They live in mattresses, bedding, carpets, curtains, and indoor dust.  · Wash bedding in hot water (130°F/54.4°C) each week.  · Cover mattresses and pillows with special mite-proof cases.  Mold. This grows in damp places, such as bathrooms, basements, and closets.  · Run an exhaust fan while bathing. Or, leave a window open in the bathroom.  · Use a dehumidifier in damp areas.  Date Last Reviewed: 5/1/2016  © 3755-3758 [address]. [address] [place] [address], [place] [address]. All rights reserved. This information is not  intended as a substitute for professional medical care. Always follow your healthcare professional's instructions.        XR CHEST PA AND LATERAL  Narrative: EXAMINATION:  XR CHEST PA AND LATERAL    CLINICAL HISTORY:  Acute respiratory distress    FINDINGS:  Heart size is normal.  Lungs are clear.  Bones showed DJD.  Impression: See above    No acute process seen.    Electronically signed by: Tio Corbett MD  Date:    08/25/2018  Time:    14:45

## 2018-10-08 ENCOUNTER — TELEPHONE (OUTPATIENT)
Dept: OBSTETRICS AND GYNECOLOGY | Facility: CLINIC | Age: 63
End: 2018-10-08

## 2018-10-08 DIAGNOSIS — N95.0 PMB (POSTMENOPAUSAL BLEEDING): Primary | ICD-10-CM

## 2018-10-10 ENCOUNTER — OFFICE VISIT (OUTPATIENT)
Dept: OBSTETRICS AND GYNECOLOGY | Facility: CLINIC | Age: 63
End: 2018-10-10
Attending: OBSTETRICS & GYNECOLOGY
Payer: COMMERCIAL

## 2018-10-10 VITALS
WEIGHT: 247.81 LBS | SYSTOLIC BLOOD PRESSURE: 126 MMHG | HEIGHT: 63 IN | BODY MASS INDEX: 43.91 KG/M2 | DIASTOLIC BLOOD PRESSURE: 86 MMHG

## 2018-10-10 DIAGNOSIS — N81.4 PELVIC RELAXATION DUE TO UTEROVAGINAL PROLAPSE: Primary | ICD-10-CM

## 2018-10-10 DIAGNOSIS — N81.11 CYSTOCELE, MIDLINE: ICD-10-CM

## 2018-10-10 DIAGNOSIS — N95.0 POSTMENOPAUSAL BLEEDING: ICD-10-CM

## 2018-10-10 PROCEDURE — 3074F SYST BP LT 130 MM HG: CPT | Mod: CPTII,S$GLB,, | Performed by: OBSTETRICS & GYNECOLOGY

## 2018-10-10 PROCEDURE — 99999 PR PBB SHADOW E&M-EST. PATIENT-LVL III: CPT | Mod: PBBFAC,,, | Performed by: OBSTETRICS & GYNECOLOGY

## 2018-10-10 PROCEDURE — 88305 TISSUE EXAM BY PATHOLOGIST: CPT | Mod: 26,,, | Performed by: PATHOLOGY

## 2018-10-10 PROCEDURE — 99213 OFFICE O/P EST LOW 20 MIN: CPT | Mod: S$GLB,,, | Performed by: OBSTETRICS & GYNECOLOGY

## 2018-10-10 PROCEDURE — 3079F DIAST BP 80-89 MM HG: CPT | Mod: CPTII,S$GLB,, | Performed by: OBSTETRICS & GYNECOLOGY

## 2018-10-10 PROCEDURE — 87624 HPV HI-RISK TYP POOLED RSLT: CPT

## 2018-10-10 PROCEDURE — 88305 TISSUE EXAM BY PATHOLOGIST: CPT | Performed by: PATHOLOGY

## 2018-10-10 PROCEDURE — 88175 CYTOPATH C/V AUTO FLUID REDO: CPT

## 2018-10-10 NOTE — PROGRESS NOTES
Subjective:       Patient ID: Jenny Caro is a 63 y.o. female.    Chief Complaint:  Gyn ultrasound (GYN u/s for post menopausal bleeding, last pap/hpv  normal, last mmg 10/17 BI-RADS 2)      History of Present Illness  63-year-old reports spotting for 3 days over this past weekend.  Spotting was from light pink only when she wiped in the morning to a slight reddish tinge  Spotting has stopped  Over the past 48 hr.  Patient here for ultrasound.  Ultrasound revealed a 10 x 5 cm uterus with a thickened endometrial stripe of 1.4 cm there is also a fundal fibroid that is 4 cm located intramural not affecting the endometrial lining.  Both ovaries were not visualized.      GYN & OB History  No LMP recorded. Patient is postmenopausal.   Date of Last Pap: 2016    OB History    Para Term  AB Living   2 2 2     2   SAB TAB Ectopic Multiple Live Births           2      # Outcome Date GA Lbr Guero/2nd Weight Sex Delivery Anes PTL Lv   2 Term      Vag-Spont      1 Term      Vag-Spont             Review of Systems  Review of Systems   Constitutional: Negative.    HENT: Negative.    Respiratory: Negative.    Cardiovascular: Negative.    Genitourinary: Positive for vaginal bleeding. Negative for vaginal pain.   Neurological: Negative.         Objective:     Vitals:    10/10/18 0931   BP: 126/86       Physical Exam:   Constitutional: She is oriented to person, place, and time. She appears well-developed and well-nourished.             Abdominal: Soft.     Genitourinary: Vagina normal.   Genitourinary Comments: No active bleeding noted today   No dc   Uterius 10 week size NT    No adnexal masses  Cystocele noted and incomplete uterovaginal prolapse noted.               Neurological: She is alert and oriented to person, place, and time.    Skin: Skin is warm.    Psychiatric: She has a normal mood and affect. Her behavior is normal.     EMB procedure note  After verbal consent obtained, Speculum placed. Betadine  used to clean the cervix. A single tooth tenaculum was placed on the anterior lip of the cervix. EMB pipelle used and moderate tissue obtained. Single tooth removed with excellent hemostasis. The speculum was removed. The patient tolerated procedure well.     Ultrasound  FINDINGS:  Uterus:  Size: 10.1 x 4.9 x 5.8 cm  Masses: There is a 4.8 cm fibroid at the uterine fundus.  Endometrium: Thickened in this post menopausal patient, measuring 14 mm.  Neither ovary identified on today's exam.  Free Fluid:  None.      Impression of u/s     Endometrium is significantly thickened at 14 mm in this postmenopausal patient.       Assessment/ Plan:     Orders Placed This Encounter    HPV High Risk Genotypes, PCR    Ambulatory Referral to Urogynecology    Liquid-based pap smear, screening    Tissue Specimen To Pathology, Obstetrics/Gynecology       Jenny was seen today for gyn ultrasound.    Diagnoses and all orders for this visit:    Pelvic relaxation due to uterovaginal prolapse  -     Ambulatory Referral to Urogynecology  she reports seeing Dr. Chapman last year for surgical evaluation of uterovaginal prolapse with a cystocele.  Adela fully evaluated the patient with urodynamics.  He states that she is not a surgical candidate   Will send to Dr Saravia for a second opinion in case we need to go to the OR for PMB    Cystocele, midline  -     Ambulatory Referral to Urogynecology    Postmenopausal bleeding  -     HPV High Risk Genotypes, PCR  -     Liquid-based pap smear, screening  -     Tissue Specimen To Pathology, Obstetrics/Gynecology  We discussed at length in her endometrial stripe was 1.4 cm and this is concerning for endometrial hyperplasia as well as uterine cancer.  Could also be a polyp that we do not see on ultrasound.  Will await endometrial biopsy and then determine what is the next best route either proceeding with a hysteroscopy D and C versus proceeding with possible hysterectomy.  Patient is most  interested in hysterectomy as well as fixing her pelvic relaxation as that seems to bother her on regularly.      Follow-up if symptoms worsen or fail to improve, for after she sees urogyn.   Patient will follow up with me after she sees Urogynecology in and after biopsy report returns.      Health Maintenance       Date Due Completion Date    Hepatitis C Screening 1955 ---    Lipid Panel 1955 ---    TETANUS VACCINE 07/28/1972 ---    Mammogram 07/28/1994 ---    Colonoscopy 07/28/2004 ---    Zoster Vaccine 07/28/2014 ---    Influenza Vaccine 08/01/2018 ---    Pap Smear with HPV Cotest 04/25/2019 4/25/2016

## 2018-10-17 ENCOUNTER — TELEPHONE (OUTPATIENT)
Dept: OBSTETRICS AND GYNECOLOGY | Facility: CLINIC | Age: 63
End: 2018-10-17

## 2018-10-17 LAB
HPV HR 12 DNA CVX QL NAA+PROBE: NEGATIVE
HPV16 AG SPEC QL: NEGATIVE
HPV18 DNA SPEC QL NAA+PROBE: NEGATIVE

## 2018-10-17 NOTE — TELEPHONE ENCOUNTER
Pt returned 's phone call.  She said she can be reached on her cell [phone] or home [phone].

## 2018-10-19 NOTE — TELEPHONE ENCOUNTER
Pt is Returning Dr Russell's call. Pt said she called on Wednesday but still hasn't heard back. Pt # [phone]

## 2018-10-19 NOTE — TELEPHONE ENCOUNTER
Spoke to pt   Needs Hyst D&C for complex hyperplasia    Requested Date:___N/A_______________  Requested Time:__ any_______________  Case Length:______40min_______________  Surgeon: Arvind STRONG Bone      Co- Surgeon: Bone   Visit Type: Outpatient  PROCEDURE: hysterscopy  D&C  Diagnosis: complex hyperplasia  Comments/Special Equipment Needed: leslie  PCP clearance: Not Needed

## 2018-10-24 ENCOUNTER — TELEPHONE (OUTPATIENT)
Dept: OBSTETRICS AND GYNECOLOGY | Facility: CLINIC | Age: 63
End: 2018-10-24

## 2018-10-26 ENCOUNTER — TELEPHONE (OUTPATIENT)
Dept: OBSTETRICS AND GYNECOLOGY | Facility: CLINIC | Age: 63
End: 2018-10-26

## 2018-10-26 DIAGNOSIS — N85.01 COMPLEX ENDOMETRIAL HYPERPLASIA: Primary | ICD-10-CM

## 2018-11-14 ENCOUNTER — INITIAL CONSULT (OUTPATIENT)
Dept: UROGYNECOLOGY | Facility: CLINIC | Age: 63
End: 2018-11-14
Attending: OBSTETRICS & GYNECOLOGY
Payer: COMMERCIAL

## 2018-11-14 VITALS
WEIGHT: 239.44 LBS | HEIGHT: 63 IN | BODY MASS INDEX: 42.43 KG/M2 | SYSTOLIC BLOOD PRESSURE: 112 MMHG | DIASTOLIC BLOOD PRESSURE: 70 MMHG

## 2018-11-14 DIAGNOSIS — N81.10 PROLAPSE OF ANTERIOR VAGINAL WALL: ICD-10-CM

## 2018-11-14 DIAGNOSIS — R39.15 URINARY URGENCY: ICD-10-CM

## 2018-11-14 DIAGNOSIS — N81.6 POSTERIOR VAGINAL WALL PROLAPSE: ICD-10-CM

## 2018-11-14 DIAGNOSIS — N81.2 UTEROVAGINAL PROLAPSE, INCOMPLETE: Primary | ICD-10-CM

## 2018-11-14 LAB
BACTERIA #/AREA URNS AUTO: NORMAL /HPF
BILIRUB UR QL STRIP: NEGATIVE
CLARITY UR REFRACT.AUTO: CLEAR
COLOR UR AUTO: YELLOW
GLUCOSE UR QL STRIP: NEGATIVE
HGB UR QL STRIP: NEGATIVE
HYALINE CASTS UR QL AUTO: 1 /LPF
KETONES UR QL STRIP: NEGATIVE
LEUKOCYTE ESTERASE UR QL STRIP: ABNORMAL
MICROSCOPIC COMMENT: NORMAL
NITRITE UR QL STRIP: NEGATIVE
PH UR STRIP: 5 [PH] (ref 5–8)
PROT UR QL STRIP: NEGATIVE
RBC #/AREA URNS AUTO: 0 /HPF (ref 0–4)
SP GR UR STRIP: 1.02 (ref 1–1.03)
SQUAMOUS #/AREA URNS AUTO: 0 /HPF
URN SPEC COLLECT METH UR: ABNORMAL
WBC #/AREA URNS AUTO: 1 /HPF (ref 0–5)

## 2018-11-14 PROCEDURE — 81001 URINALYSIS AUTO W/SCOPE: CPT

## 2018-11-14 PROCEDURE — 99245 OFF/OP CONSLTJ NEW/EST HI 55: CPT | Mod: 25,S$GLB,, | Performed by: OBSTETRICS & GYNECOLOGY

## 2018-11-14 PROCEDURE — 99999 PR PBB SHADOW E&M-EST. PATIENT-LVL IV: CPT | Mod: PBBFAC,,, | Performed by: OBSTETRICS & GYNECOLOGY

## 2018-11-14 PROCEDURE — 87086 URINE CULTURE/COLONY COUNT: CPT

## 2018-11-14 PROCEDURE — 51701 INSERT BLADDER CATHETER: CPT | Mod: S$GLB,,, | Performed by: OBSTETRICS & GYNECOLOGY

## 2018-11-14 NOTE — LETTER
November 14, 2018      Arvind Russell MD  [address]  [address]  [facility] [address]           [facility] [address] [place] [address]  Phone: [phone]          Patient: Jenny Caro   MR Number: [id number]   YOB: 1955   Date of Visit: 11/14/2018       Dear Dr. Arvind Russell:    Thank you for referring Jenny Caro to me for evaluation. Attached you will find relevant portions of my assessment and plan of care.    If you have questions, please do not hesitate to call me. I look forward to following Jenny Caro along with you.    Sincerely,    Rui Lozano, DO [facility]  CC:  No Recipients    If you would like to receive this communication electronically, please contact [email or web address] or [phone] to request more information on [employer] access.    For providers and/or their staff who would like to refer a patient to [facility], please contact us through our one-stop-shop provider referral line, [facility], at [phone].    If you feel you have received this communication in error or would no longer like to receive these types of communications, please e-mail [email or web address]

## 2018-11-14 NOTE — PATIENT INSTRUCTIONS
1. Prolapse: Stage 2 apical prolapse, Stage 2  anterior and posterior vaginal wall prolapse   --Pessary benefit discussed with patient, will schedule for pessary fitting with NP Laura  --Daily pelvic floor exercises as assigned,  --Non surgical options discussed with patient    --Surgical options discussed such as: Hysterectomy ( vaginal or laparoscopic)  with apical suspension: SSF, USLS and SCP with mesh augmentation. We also discussed the pro's and con's of hysteropexy.  Risks/ benefits/ alternatives/ succuss and failure rates were reviewed. Information packets were given detailing surgical options.  She was also given web site information for: www.augs.org and www.DGTSsforpFirst Warning Systems.org and http://www.fda.gov/MedicalDevices/UroGynSurgicalMesh/default.htm to review the details of the surgical options discussed and the use of mesh in surgery. She will review the information given and we will discuss further at the follow up visit.

## 2018-11-14 NOTE — PROGRESS NOTES
Subjective:       Patient ID: Jenny Caro is a 63 y.o. female.    Chief Complaint:  Vaginal Prolapse and Vaginal Bleeding      History of Present Illness  HPI 63 Y O F P 2  has a past medical history of Abnormal Pap smear of cervix (2009), GERD (gastroesophageal reflux disease), HPV in female, Hyperlipidemia, Hypertension, and Ulcerative colitis. Referred by Dr. Russell for evaluation of pelvic organ prolapse. She was seen Dr. Chapman the past  but decided not to proceed with pelvic floor PT as recommended.    She has a history of UC diagnosed in 2007 and managed by Dr. Mcadams with Lialda. Symptoms have been mild. She does have some issues with gas but gas-x has helped with her symptoms. She is scheduled to have D&C hysteroscopy with Dr. Russell  11/20/2018. She seems to want to proceed with surgical intervention.     [facility]     Question 11/14/2018  8:55 AM CST - Filed by Patient on 11/14/2018   General Urogynecology: Are you experiencing the following?    Dysuria (painful urination) No   Nocturia:  waking up at night to empty your bladder  Yes   If you answered yes to the previous question, how many times does this happen per night? 1-2   Enuresis (urine loss during sleep) No   Dribbling urine after you urinate Yes   Hematuria (urine appears red) No   Type of stream Weak   Urinary frequency: How often a day are you going to the bathroom per day?  Less than 10   Urinary Tract Infections: How many Urinary Tract Infections have you had in the past year? Two (2) in six (6) months   If you have had a UTI in the past year, what treatments have you had so far?  Prophylactic antibiotics   Urinary Incontinence (General): Are you experiencing the following?    Past consultation for incontinence: Have you ever seen someone for the evaluation of incontinence? No   If you answered yes to the previous question, please select all the therapies you have tried.  None of the above   Please note the effectiveness of the  "therapies.    Need to wear protection to keep clothes dry  Yes   If you answered yes to the previous question, please carlton the protection you use.  Poise   If you wear protection, how much wetness is typically on each pad? Scant   If you wear protection, how often do you have to change per day, if applicable?  0   Stress Symptoms: Are you experiencing the following?    Leakage of urine with cough, laugh and/or sneeze No   If you answered yes to the previous question, what is the frequency in days, weeks and/or months? Never   Leakage of urine with sex No   Leakage of urine with bending/ lifting No   Leakage of urine with briskly walking or jogging No   If you lose urine for any other reason not previously mentioned, please note it below, if applicable.     Urge Symptoms: Are you experiencing the following?    Urgency ("got to go" feeling) No   Urge: How frequently do you feel an urge to urinate (feeling like you "gotta go" to the bathroom and can't wait) Never   Do you experience a leakage of urine when you have a feeling of urgency?  No   Leakage of urine when unaware Yes   Past use of anticholinergics (medications used to treat overactive bladder) No   If you answered yes to the previous question, please carlton the anticholinergics you have used:     Have you ever used Mirbetriq (aka Mirabegron)?  No   Prolapse Symptoms: Are you experiencing any of the following?     Falling out/ Bulging/ Heaviness in the vagina Yes   Vaginal/ Abdominal Pain/ Pressure No   Need to strain/ Push to void No   Need to wait on the toilet before you void No   Unusual position to urinate (using your hands to push back the vaginal bulge) No   Sensation of incomplete emptying Yes   Past use of pessary device No   If you answered yes to the previous question, please list the devices you have used below.     Bowel Symptoms: Are you experiencing any of the following?    Constipation No   Diarrhea  No   Hematochezia (bloody stool) No "   Incomplete evacuation of stool Yes   Involuntary loss of formed stool Yes   Fecal smearing/urgency Yes   Involuntary loss of gas Yes   Vaginal Symptoms: Are you experiencing any of the following?     Abnormal vaginal bleeding  No   Vaginal dryness No   Sexually active  Yes   Dyspareunia (painful intercourse) No   Estrogen use  No       GYN & OB History  No LMP recorded. Patient is postmenopausal.   Date of Last Pap: 10/17/2018    OB History    Para Term  AB Living   2 2 2     2   SAB TAB Ectopic Multiple Live Births           2      # Outcome Date GA Lbr Guero/2nd Weight Sex Delivery Anes PTL Lv   2 Term      Vag-Spont      1 Term      Vag-Spont           OB  Largest: 9#  Forceps: ?  Episiotomy:  Yes     GYN  Menarche: 11  Menstrual cycle:   Menopause: 54  Hysterectomy: No   Ovaries:       Past Medical History:   Diagnosis Date    Abnormal Pap smear of cervix     LEEP , mild dysplasia normal paps since    GERD (gastroesophageal reflux disease)     HPV in female     Hyperlipidemia     Hypertension     Ulcerative colitis      Past Surgical History:   Procedure Laterality Date    CERVICAL BIOPSY  W/ LOOP ELECTRODE EXCISION      gastric sleeve  2015    LAPAROSCOPIC GASTRIC BANDING       Review of patient's allergies indicates:   Allergen Reactions    Bactrim [sulfamethoxazole-trimethoprim]     Ciprofloxacin        Current Outpatient Medications:     BETA-CAROTENE,A, W-C & E/MIN (OCUVITE ORAL), Take by mouth., Disp: , Rfl:     LIALDA 1.2 gram TbEC, , Disp: , Rfl:     lisinopril 10 MG tablet, Take 10 mg by mouth once daily., Disp: , Rfl:     omeprazole (PRILOSEC) 20 MG capsule, , Disp: , Rfl:     pravastatin (PRAVACHOL) 20 MG tablet, , Disp: , Rfl:       Review of Systems  Review of Systems   Constitutional: Negative.  Negative for activity change, appetite change, chills, diaphoresis, fatigue, fever and unexpected weight change.   HENT: Negative.    Eyes: Negative.     Respiratory: Negative.  Negative for cough.    Cardiovascular: Negative.    Gastrointestinal: Negative for abdominal distention, abdominal pain, anal bleeding, blood in stool, constipation, diarrhea, nausea, rectal pain and vomiting.   Endocrine: Negative.    Genitourinary: Positive for difficulty urinating. Negative for decreased urine volume, dyspareunia, dysuria, enuresis, flank pain, frequency, genital sores, hematuria, menstrual problem, pelvic pain, urgency, vaginal bleeding, vaginal discharge and vaginal pain.        Prolapse  + vaginal bulge   +vaginal pressure    Musculoskeletal: Negative for back pain.   Skin: Negative for color change, pallor, rash and wound.   Allergic/Immunologic: Negative for environmental allergies, food allergies and immunocompromised state.   Hematological: Negative for adenopathy. Does not bruise/bleed easily.   Psychiatric/Behavioral: Negative for agitation, behavioral problems, confusion and sleep disturbance.           Objective:     Physical Exam   Constitutional: She is oriented to person, place, and time. She appears well-developed.   HENT:   Head: Normocephalic and atraumatic.   Eyes: Conjunctivae and EOM are normal.   Neck: Normal range of motion. Neck supple.   Cardiovascular: Normal rate, regular rhythm, S1 normal, S2 normal, normal heart sounds and intact distal pulses.   Pulmonary/Chest: Effort normal and breath sounds normal. She exhibits no tenderness.   Abdominal: Soft. Bowel sounds are normal. She exhibits no distension and no mass. There is no hepatosplenomegaly, splenomegaly or hepatomegaly. There is no tenderness. There is no rigidity, no rebound, no guarding and no CVA tenderness. No hernia.       Genitourinary: Pelvic exam was performed with patient supine. Rectum normal, vagina normal, uterus normal, cervix normal, skenes normal and bartholins normal. Right labia normal and left labia normal. Urethra exhibits hypermobility. Urethra exhibits no urethral  caruncle, no urethral diverticulum and no urethral mass. Right bartholin is not enlarged and not tender. Left bartholin is not enlarged and not tender. Rectal exam shows resting tone normal and active tone normal. Rectal exam shows no external hemorrhoid, no fissure, no tenderness, anal tone normal and no dovetailing. Guaiac negative stool. There is a rectocele, a cystocele, unspecified prolapse of vaginal walls and atrophy in the vagina. No foreign body, tenderness, bleeding, fistula, mesh exposure or lavator tenderness in the vagina. No vaginal discharge found. Right adnexum displays no mass and no tenderness. Left adnexum displays no mass and no tenderness. Cervix exhibits no motion tenderness and no discharge. Uterus is experiencing uterine prolapse. Uterus is not tender.   PVR: 110 ML  Empty cough stress test: Negative.  Kegel: 2/5    POP-Q  Aa: 2 Ba: 2 C: 0   GH: 5 PB: 2 TVL: 10   Ap: 0 Bp: 0 D: -2                     Genitourinary Comments: Poor uterosacral ligaments    Musculoskeletal: Normal range of motion.   Lymphadenopathy:     She has no axillary adenopathy.        Right: No inguinal adenopathy present.        Left: No inguinal adenopathy present.   Neurological: She is alert and oriented to person, place, and time. She has normal strength and normal reflexes. Cranial nerves II through XII intact. No cranial nerve deficit.   Skin: Skin is warm, dry and intact.   Psychiatric: She has a normal mood and affect. Her speech is normal and behavior is normal. Judgment normal. Cognition and memory are normal.           HCM  Pap's:  Abnormal in 2009, 2018- normal, neg hpv  Mammo:  Normal 2017, DIS 2018 pending   Colonoscopy: UC done Q 45 years, colitis resolved 2017  Dexa: No        Assessment:        1. Uterovaginal prolapse, incomplete    2. Urinary urgency    3. Prolapse of anterior vaginal wall    4. Posterior vaginal wall prolapse               Plan:      1. Prolapse: Stage 2 apical prolapse, Stage 2   anterior and posterior vaginal wall prolapse   --Pessary benefit discussed with patient, declined   --Daily pelvic floor exercises as assigned,  --Non surgical options discussed with patient    --Surgical options discussed such as: Hysterectomy ( vaginal or laparoscopic)  with apical suspension: SSF, USLS and SCP with mesh augmentation. We also discussed the pro's and con's of hysteropexy.  Risks/ benefits/ alternatives/ succuss and failure rates were reviewed. Information packets were given detailing surgical options.  She was also given web site information for: www.augs.org and www.[employer]ok[employer]ASOCS.org and http://www.fda.gov/MedicalDevices/UroGynSurgicalMesh/default.htm to review the details of the surgical options discussed and the use of mesh in surgery. She seems interested to proceed with the sacrocolpopexy. I have asked her to review the information given and we will discuss further at the follow up visit. Given her UC would want to make sure that her disease was stable prior to placing mesh for apical support. I discussed the potential but rare risks of mesh erosion and osteomyelitis of the sacrum. We will need to speak with her GI to better evaluate the status of her UC.      2.  Incomplete bladder emptying :  --Double void and Crede maneuvers discussed  --Repeat PVR at the next visit      Approximately 60 min were spent in consult, 90 % in discussion.     Thank you for requesting consultation of your patient.  I look forward to participating in her care.    Rui Lozano DO  Female Pelvic Medicine and Reconstructive Surgery  [facility] [place], [place]

## 2018-11-15 LAB — BACTERIA UR CULT: NO GROWTH

## 2018-11-16 ENCOUNTER — OFFICE VISIT (OUTPATIENT)
Dept: OBSTETRICS AND GYNECOLOGY | Facility: CLINIC | Age: 63
End: 2018-11-16
Payer: COMMERCIAL

## 2018-11-16 ENCOUNTER — HOSPITAL ENCOUNTER (OUTPATIENT)
Dept: PREADMISSION TESTING | Facility: OTHER | Age: 63
Discharge: HOME | End: 2018-11-16
Attending: OBSTETRICS & GYNECOLOGY
Payer: COMMERCIAL

## 2018-11-16 ENCOUNTER — ANESTHESIA EVENT (OUTPATIENT)
Dept: SURGERY | Facility: OTHER | Age: 63
End: 2018-11-16
Payer: COMMERCIAL

## 2018-11-16 VITALS
DIASTOLIC BLOOD PRESSURE: 67 MMHG | SYSTOLIC BLOOD PRESSURE: 122 MMHG | BODY MASS INDEX: 42.17 KG/M2 | HEIGHT: 63 IN | HEART RATE: 70 BPM | WEIGHT: 238 LBS | OXYGEN SATURATION: 96 % | TEMPERATURE: 98 F

## 2018-11-16 VITALS
DIASTOLIC BLOOD PRESSURE: 84 MMHG | SYSTOLIC BLOOD PRESSURE: 128 MMHG | HEIGHT: 63 IN | WEIGHT: 138 LBS | BODY MASS INDEX: 24.45 KG/M2

## 2018-11-16 DIAGNOSIS — N85.01 COMPLEX ENDOMETRIAL HYPERPLASIA: Primary | ICD-10-CM

## 2018-11-16 PROCEDURE — 99999 PR PBB SHADOW E&M-EST. PATIENT-LVL III: CPT | Mod: PBBFAC,,, | Performed by: OBSTETRICS & GYNECOLOGY

## 2018-11-16 PROCEDURE — 99499 UNLISTED E&M SERVICE: CPT | Mod: S$GLB,,, | Performed by: OBSTETRICS & GYNECOLOGY

## 2018-11-16 RX ORDER — DEXTROMETHORPHAN HYDROBROMIDE, GUAIFENESIN 5; 100 MG/5ML; MG/5ML
650 LIQUID ORAL
COMMUNITY
End: 2019-05-08

## 2018-11-16 RX ORDER — SODIUM CHLORIDE, SODIUM LACTATE, POTASSIUM CHLORIDE, CALCIUM CHLORIDE 600; 310; 30; 20 MG/100ML; MG/100ML; MG/100ML; MG/100ML
INJECTION, SOLUTION INTRAVENOUS CONTINUOUS
Status: CANCELLED | OUTPATIENT
Start: 2018-11-16

## 2018-11-16 RX ORDER — PREGABALIN 75 MG/1
150 CAPSULE ORAL
Status: DISCONTINUED | OUTPATIENT
Start: 2018-11-20 | End: 2018-11-17 | Stop reason: HOSPADM

## 2018-11-16 RX ORDER — CETIRIZINE HYDROCHLORIDE 10 MG/1
10 TABLET ORAL DAILY
COMMUNITY

## 2018-11-16 RX ORDER — NICOTINE POLACRILEX 2 MG
GUM BUCCAL
COMMUNITY

## 2018-11-16 RX ORDER — FAMOTIDINE 20 MG/1
20 TABLET, FILM COATED ORAL
Status: CANCELLED | OUTPATIENT
Start: 2018-11-16 | End: 2018-11-16

## 2018-11-16 RX ORDER — DIPHENHYDRAMINE HCL 50 MG
50 CAPSULE ORAL NIGHTLY PRN
COMMUNITY
End: 2019-10-10

## 2018-11-16 RX ORDER — IBUPROFEN 200 MG
200 TABLET ORAL EVERY 6 HOURS PRN
COMMUNITY
End: 2019-03-06

## 2018-11-16 RX ORDER — LORATADINE 10 MG/1
10 TABLET ORAL DAILY
COMMUNITY
End: 2019-10-10

## 2018-11-16 RX ORDER — MV/FA/DHA/EPA/FISH OIL/SAW/GNK 400MCG-200
COMBINATION PACKAGE (EA) ORAL DAILY
COMMUNITY

## 2018-11-16 NOTE — ANESTHESIA PREPROCEDURE EVALUATION
11/16/2018  Jenny Caro is a 63 y.o., female.    Anesthesia Evaluation    I have reviewed the Patient Summary Reports.    I have reviewed the Nursing Notes.   I have reviewed the Medications.     Review of Systems  Anesthesia Hx:  No problems with previous Anesthesia  Denies Family Hx of Anesthesia complications.   Denies Personal Hx of Anesthesia complications.   Social:  Non-Smoker    Hematology/Oncology:  Hematology Normal   Oncology Normal     Cardiovascular:   Hypertension, well controlled    Pulmonary:  Pulmonary Normal    Renal/:  Renal/ Normal     Hepatic/GI:   Hiatal Hernia, GERD Gastric sleeve with 70 lb wt loss   Musculoskeletal:  Musculoskeletal Normal    Neurological:  Neurology Normal    Endocrine:  Endocrine Normal        Physical Exam  General:  Well nourished    Airway/Jaw/Neck:  Airway Findings: Mouth Opening: Normal Tongue: Normal  General Airway Assessment: Adult  Mallampati: I  TM Distance: Normal, at least 6 cm         Dental:  Dental Findings: In tact, molar caps             Anesthesia Plan  Type of Anesthesia, risks & benefits discussed:  Anesthesia Type:  general  Patient's Preference:   Intra-op Monitoring Plan: standard ASA monitors  Intra-op Monitoring Plan Comments:   Post Op Pain Control Plan: per primary service following discharge from PACU  Post Op Pain Control Plan Comments:   Induction:   IV  Beta Blocker:         Informed Consent: Patient understands risks and agrees with Anesthesia plan.  Questions answered. Anesthesia consent signed with patient.  ASA Score: 3     Day of Surgery Review of History & Physical:    H&P update referred to the surgeon.         Ready For Surgery From Anesthesia Perspective.

## 2018-11-16 NOTE — DISCHARGE INSTRUCTIONS
PRE-ADMIT TESTING -  [phone]    [address]  [facility]          Your surgery has been scheduled at [facility]. We are pleased to have the opportunity to serve you. For Further Information please call [phone].    On the day of surgery please report to the Information Desk on the 1st floor.    · CONTACT YOUR PHYSICIAN'S OFFICE THE DAY PRIOR TO YOUR SURGERY TO OBTAIN YOUR ARRIVAL TIME.     · The evening before surgery do not eat anything after 9 p.m. ( this includes hard candy, chewing gum and mints).  You may only have GATORADE, POWERADE AND WATER  from 9 p.m. until you leave your home.   DO NOT DRINK ANY LIQUIDS ON THE WAY TO THE HOSPITAL.      SPECIAL MEDICATION INSTRUCTIONS: TAKE medications checked off by the Anesthesiologist on your Medication List.    Angiogram Patients: Take medications as instructed by your physician, including aspirin.     Surgery Patients:    If you take ASPIRIN - Your PHYSICIAN/SURGEON will need to inform you IF/OR when you need to stop taking aspirin prior to your surgery.     Do Not take any medications containing IBUPROFEN.  Do Not Wear any make-up or dark nail polish   (especially eye make-up) to surgery. If you come to surgery with makeup on you will be required to remove the makeup or nail polish.    Do not shave your surgical area at least 5 days prior to your surgery. The surgical prep will be performed at the hospital according to Infection Control regulations.    Leave all valuables at home.   Do Not wear any jewelry or watches, including any metal in body piercings.  Contact Lens must be removed before surgery. Either do not wear the contact lens or bring a case and solution for storage.  Please bring a container for eyeglasses or dentures as required.  Bring any paperwork your physician has provided, such as consent forms,  history and physicals, doctor's orders, etc.   Bring comfortable clothes that are loose fitting to wear upon  discharge. Take into consideration the type of surgery being performed.  Maintain your diet as advised per your physician the day prior to surgery.      Adequate rest the night before surgery is advised.   Park in the Parking lot behind the hospital or in the [facility] across the street from the parking lot. Parking is complimentary.  If you will be discharged the same day as your procedure, please arrange for a responsible adult to drive you home or to accompany you if traveling by taxi.   YOU WILL NOT BE PERMITTED TO DRIVE OR TO LEAVE THE HOSPITAL ALONE AFTER SURGERY.   It is strongly recommended that you arrange for someone to remain with you for the first 24 hrs following your surgery.       Thank you for your cooperation.  The Staff of [facility].        Bathing Instructions                                                                 Please shower the evening before and morning of your procedure with    ANTIBACTERIAL SOAP. ( DIAL, etc )  Concentrate on the surgical area   for at least 3 minutes and rinse completely. Dry off as usual.   Do not use any deodorant, powder, body lotions, perfume, after shave or    cologne.

## 2018-11-16 NOTE — PROGRESS NOTES
63 y.o.  presents for pre-op H&P for hysteroscopy D&C for complex hyperplasia  63-year-old reports spotting for 3 days over this past weekend.  Spotting was from light pink only when she wiped in the morning to a slight reddish tinge  Spotting has not occurred since.  Endometrial biopsy was performed which revealed complex hyperplasia with atypia    Uterus, endometrial biopsy:  -Complex endometrial hyperplasia without atypia.      No LMP recorded. Patient is postmenopausal..    Past Medical History:   Diagnosis Date    Abnormal Pap smear of cervix     LEEP , mild dysplasia normal paps since    GERD (gastroesophageal reflux disease)     HPV in female     Hyperlipidemia     Hypertension     Ulcerative colitis      Past Surgical History:   Procedure Laterality Date    CERVICAL BIOPSY  W/ LOOP ELECTRODE EXCISION      gastric sleeve  2015    LAPAROSCOPIC GASTRIC BANDING       Family History   Problem Relation Age of Onset    Diabetes Mother     Heart disease Father     Stroke Sister     Diabetes Sister     Breast cancer Neg Hx      Review of patient's allergies indicates:   Allergen Reactions    Bactrim [sulfamethoxazole-trimethoprim]     Ciprofloxacin        Current Outpatient Medications:     ascorbic acid, vitamin C, (VITAMIN C) 100 MG tablet, Take 100 mg by mouth once daily., Disp: , Rfl:     BETA-CAROTENE,A, W-C & E/MIN (OCUVITE ORAL), Take by mouth., Disp: , Rfl:     biotin 1 mg Cap, Take by mouth., Disp: , Rfl:     ibuprofen (ADVIL,MOTRIN) 200 MG tablet, Take 200 mg by mouth every 6 (six) hours as needed for Pain., Disp: , Rfl:     LIALDA 1.2 gram TbEC, , Disp: , Rfl:     lisinopril 10 MG tablet, Take 10 mg by mouth once daily., Disp: , Rfl:     omeprazole (PRILOSEC) 20 MG capsule, , Disp: , Rfl:     pravastatin (PRAVACHOL) 20 MG tablet, , Disp: , Rfl:     simethicone (GAS-X ORAL), Take by mouth., Disp: , Rfl:   Social History     Socioeconomic History    Marital  status:      Spouse name: Not on file    Number of children: Not on file    Years of education: Not on file    Highest education level: Not on file   Social Needs    Financial resource strain: Not on file    Food insecurity - worry: Not on file    Food insecurity - inability: Not on file    Transportation needs - medical: Not on file    Transportation needs - non-medical: Not on file   Occupational History    Occupation: retired   Tobacco Use    Smoking status: Never Smoker    Smokeless tobacco: Never Used   Substance and Sexual Activity    Alcohol use: No    Drug use: No    Sexual activity: Yes     Partners: Male     Birth control/protection: Post-menopausal   Other Topics Concern    Not on file   Social History Narrative    Not on file         Last pap 10/17/2018  Last pathology:  Last ultrasound:   Results for orders placed in visit on 10/10/18   US Pelvis Comp with Transvag NON-OB (xpd    Narrative EXAMINATION:  US PELVIS COMP WITH TRANSVAG NON-OB (XPD)    CLINICAL HISTORY:  Postmenopausal bleeding    TECHNIQUE:  Transabdominal sonography of the pelvis was performed, followed by transvaginal sonography to better evaluate the uterus and ovaries.    COMPARISON:  None.    FINDINGS:  Uterus:    Size: 10.1 x 4.9 x 5.8 cm    Masses: There is a 4.8 cm fibroid at the uterine fundus.    Endometrium: Thickened in this post menopausal patient, measuring 14 mm.    Neither ovary identified on today's exam.    Free Fluid:    None.      Impression Endometrium is significantly thickened at 14 mm in this postmenopausal patient.    Dominant uterine fibroid at the fundus.    Neither ovary is identified on today's exam.    This report was flagged in Epic as abnormal.      Electronically signed by: Sanjay [facility] MD  Date:    10/10/2018  Time:    09:45     Last labs:No results found for: WBC, HGB, HCT, MCV, PLT    Vitals:    11/16/18 0829   BP: 128/84     General Appearance: Alert, appropriate appearance for  age. No acute distress, Chest/Respiratory Exam: Normal chest wall and respirations. Clear to auscultation.   Cardiovascular Exam: regular rate and rhythm,   Gastrointestinal Exam: soft, non-tender; bowel sounds normal; no masses  Pelvic Exam Female: Exam deferred.   Psychiatric Exam: Alert and oriented, appropriate affect.    Assessment:  Complex hyperplasia without atypia.  Patient scheduled for hysteroscopy D&C.    Problem List Items Addressed This Visit        Renal/    Complex endometrial hyperplasia - Primary          Plan:  Hysteroscopy D&C next week  I have discussed the risks, benefits, indications, and alternatives of the procedure in detail.  The patient verbalizes her understanding.  All questions answered.  Consents signed.  The patient agrees to proceed to proceed as planned.

## 2018-11-16 NOTE — H&P
CC:63 y.o.  presents for pre-op H&P for hysteroscopy D&C for complex hyperplasia    63-year-old reports spotting for 3 days over this past weekend.  Spotting was from light pink only when she wiped in the morning to a slight reddish tinge  Spotting has not occurred since.  Endometrial biopsy was performed which revealed complex hyperplasia with atypia    Uterus, endometrial biopsy:  -Complex endometrial hyperplasia without atypia.      No LMP recorded. Patient is postmenopausal..    Past Medical History:   Diagnosis Date    Abnormal Pap smear of cervix     LEEP , mild dysplasia normal paps since    GERD (gastroesophageal reflux disease)     HPV in female     Hyperlipidemia     Hypertension     Ulcerative colitis      Past Surgical History:   Procedure Laterality Date    CERVICAL BIOPSY  W/ LOOP ELECTRODE EXCISION      gastric sleeve  2015    LAPAROSCOPIC GASTRIC BANDING       Family History   Problem Relation Age of Onset    Diabetes Mother     Heart disease Father     Stroke Sister     Diabetes Sister     Breast cancer Neg Hx      Review of patient's allergies indicates:   Allergen Reactions    Bactrim [sulfamethoxazole-trimethoprim]     Ciprofloxacin        Current Outpatient Medications:     ascorbic acid, vitamin C, (VITAMIN C) 100 MG tablet, Take 100 mg by mouth once daily., Disp: , Rfl:     BETA-CAROTENE,A, W-C & E/MIN (OCUVITE ORAL), Take by mouth., Disp: , Rfl:     biotin 1 mg Cap, Take by mouth., Disp: , Rfl:     ibuprofen (ADVIL,MOTRIN) 200 MG tablet, Take 200 mg by mouth every 6 (six) hours as needed for Pain., Disp: , Rfl:     LIALDA 1.2 gram TbEC, , Disp: , Rfl:     lisinopril 10 MG tablet, Take 10 mg by mouth once daily., Disp: , Rfl:     omeprazole (PRILOSEC) 20 MG capsule, , Disp: , Rfl:     pravastatin (PRAVACHOL) 20 MG tablet, , Disp: , Rfl:     simethicone (GAS-X ORAL), Take by mouth., Disp: , Rfl:   Social History     Socioeconomic History     Marital status:      Spouse name: Not on file    Number of children: Not on file    Years of education: Not on file    Highest education level: Not on file   Social Needs    Financial resource strain: Not on file    Food insecurity - worry: Not on file    Food insecurity - inability: Not on file    Transportation needs - medical: Not on file    Transportation needs - non-medical: Not on file   Occupational History    Occupation: retired   Tobacco Use    Smoking status: Never Smoker    Smokeless tobacco: Never Used   Substance and Sexual Activity    Alcohol use: No    Drug use: No    Sexual activity: Yes     Partners: Male     Birth control/protection: Post-menopausal   Other Topics Concern    Not on file   Social History Narrative    Not on file         Last pap 10/17/2018  Last pathology:  Last ultrasound:   Results for orders placed in visit on 10/10/18   US Pelvis Comp with Transvag NON-OB (xpd    Narrative EXAMINATION:  US PELVIS COMP WITH TRANSVAG NON-OB (XPD)    CLINICAL HISTORY:  Postmenopausal bleeding    TECHNIQUE:  Transabdominal sonography of the pelvis was performed, followed by transvaginal sonography to better evaluate the uterus and ovaries.    COMPARISON:  None.    FINDINGS:  Uterus:    Size: 10.1 x 4.9 x 5.8 cm    Masses: There is a 4.8 cm fibroid at the uterine fundus.    Endometrium: Thickened in this post menopausal patient, measuring 14 mm.    Neither ovary identified on today's exam.    Free Fluid:    None.      Impression Endometrium is significantly thickened at 14 mm in this postmenopausal patient.    Dominant uterine fibroid at the fundus.    Neither ovary is identified on today's exam.    This report was flagged in Epic as abnormal.      Electronically signed by: Sanjay Lancaster MD  Date:    10/10/2018  Time:    09:45     Last labs:No results found for: WBC, HGB, HCT, MCV, PLT    Vitals:    11/16/18 0829   BP: 128/84     General Appearance: Alert, appropriate  appearance for age. No acute distress, Chest/Respiratory Exam: Normal chest wall and respirations. Clear to auscultation.   Cardiovascular Exam: regular rate and rhythm,   Gastrointestinal Exam: soft, non-tender; bowel sounds normal; no masses  Pelvic Exam Female: Exam deferred.   Psychiatric Exam: Alert and oriented, appropriate affect.    Assessment:  Complex hyperplasia without atypia.  Patient scheduled for hysteroscopy D&C.    Problem List Items Addressed This Visit        Renal/    Complex endometrial hyperplasia - Primary          63 y.o.  presents for pre-op H&P for hysteroscopy D&C for complex hyperplasia  63-year-old reports spotting for 3 days over this past weekend.  Spotting was from light pink only when she wiped in the morning to a slight reddish tinge  Spotting has not occurred since.  Endometrial biopsy was performed which revealed complex hyperplasia with atypia     Uterus, endometrial biopsy:  -Complex endometrial hyperplasia without atypia.       No LMP recorded. Patient is postmenopausal..          Past Medical History:   Diagnosis Date    Abnormal Pap smear of cervix      LEEP , mild dysplasia normal paps since    GERD (gastroesophageal reflux disease)      HPV in female      Hyperlipidemia      Hypertension      Ulcerative colitis        Past Surgical History:   Procedure Laterality Date    CERVICAL BIOPSY  W/ LOOP ELECTRODE EXCISION       gastric sleeve   2015    LAPAROSCOPIC GASTRIC BANDING   2006            Family History   Problem Relation Age of Onset    Diabetes Mother      Heart disease Father      Stroke Sister      Diabetes Sister      Breast cancer Neg Hx        Review of patient's allergies indicates:   Allergen Reactions    Bactrim [sulfamethoxazole-trimethoprim]      Ciprofloxacin           Current Outpatient Medications:     ascorbic acid, vitamin C, (VITAMIN C) 100 MG tablet, Take 100 mg by mouth once daily., Disp: , Rfl:      BETA-CAROTENE,A, W-C & E/MIN (OCUVITE ORAL), Take by mouth., Disp: , Rfl:     biotin 1 mg Cap, Take by mouth., Disp: , Rfl:     ibuprofen (ADVIL,MOTRIN) 200 MG tablet, Take 200 mg by mouth every 6 (six) hours as needed for Pain., Disp: , Rfl:     LIALDA 1.2 gram TbEC, , Disp: , Rfl:     lisinopril 10 MG tablet, Take 10 mg by mouth once daily., Disp: , Rfl:     omeprazole (PRILOSEC) 20 MG capsule, , Disp: , Rfl:     pravastatin (PRAVACHOL) 20 MG tablet, , Disp: , Rfl:     simethicone (GAS-X ORAL), Take by mouth., Disp: , Rfl:   Social History               Socioeconomic History    Marital status:        Spouse name: Not on file    Number of children: Not on file    Years of education: Not on file    Highest education level: Not on file   Social Needs    Financial resource strain: Not on file    Food insecurity - worry: Not on file    Food insecurity - inability: Not on file    Transportation needs - medical: Not on file    Transportation needs - non-medical: Not on file   Occupational History    Occupation: retired   Tobacco Use    Smoking status: Never Smoker    Smokeless tobacco: Never Used   Substance and Sexual Activity    Alcohol use: No    Drug use: No    Sexual activity: Yes       Partners: Male       Birth control/protection: Post-menopausal   Other Topics Concern    Not on file   Social History Narrative    Not on file               Last pap 10/17/2018  Last pathology:  Last ultrasound:        Results for orders placed in visit on 10/10/18   US Pelvis Comp with Transvag NON-OB (xpd     Narrative EXAMINATION:  US PELVIS COMP WITH TRANSVAG NON-OB (XPD)     CLINICAL HISTORY:  Postmenopausal bleeding     TECHNIQUE:  Transabdominal sonography of the pelvis was performed, followed by transvaginal sonography to better evaluate the uterus and ovaries.     COMPARISON:  None.     FINDINGS:  Uterus:     Size: 10.1 x 4.9 x 5.8 cm     Masses: There is a 4.8 cm fibroid at the uterine  fundus.     Endometrium: Thickened in this post menopausal patient, measuring 14 mm.     Neither ovary identified on today's exam.     Free Fluid:     None.        Impression Endometrium is significantly thickened at 14 mm in this postmenopausal patient.     Dominant uterine fibroid at the fundus.     Neither ovary is identified on today's exam.     This report was flagged in Epic as abnormal.        Electronically signed by:       Sanjay Lancaster MD  Date:                                                10/10/2018  Time:                                                09:45      Last labs:No results found for: WBC, HGB, HCT, MCV, PLT         Vitals:     11/16/18 0829   BP: 128/84      General Appearance: Alert, appropriate appearance for age. No acute distress, Chest/Respiratory Exam: Normal chest wall and respirations. Clear to auscultation.   Cardiovascular Exam: regular rate and rhythm,   Gastrointestinal Exam: soft, non-tender; bowel sounds normal; no masses  Pelvic Exam Female: Exam deferred.   Psychiatric Exam: Alert and oriented, appropriate affect.     Assessment:  Complex hyperplasia without atypia.  Patient scheduled for hysteroscopy D&C.          Problem List Items Addressed This Visit                 Renal/      Complex endometrial hyperplasia - Primary              Plan:  Hysteroscopy D&C next week  I have discussed the risks, benefits, indications, and alternatives of the procedure in detail.  The patient verbalizes her understanding.  All questions answered.  Consents signed.  The patient agrees to proceed to proceed as planned.

## 2018-11-20 ENCOUNTER — HOSPITAL ENCOUNTER (OUTPATIENT)
Facility: OTHER | Age: 63
Discharge: HOME | End: 2018-11-20
Attending: OBSTETRICS & GYNECOLOGY | Admitting: OBSTETRICS & GYNECOLOGY
Payer: COMMERCIAL

## 2018-11-20 ENCOUNTER — ANESTHESIA (OUTPATIENT)
Dept: SURGERY | Facility: OTHER | Age: 63
End: 2018-11-20
Payer: COMMERCIAL

## 2018-11-20 VITALS
WEIGHT: 238 LBS | RESPIRATION RATE: 18 BRPM | OXYGEN SATURATION: 97 % | SYSTOLIC BLOOD PRESSURE: 142 MMHG | BODY MASS INDEX: 42.17 KG/M2 | TEMPERATURE: 98 F | HEIGHT: 63 IN | HEART RATE: 60 BPM | DIASTOLIC BLOOD PRESSURE: 81 MMHG

## 2018-11-20 DIAGNOSIS — N85.01 COMPLEX ENDOMETRIAL HYPERPLASIA: ICD-10-CM

## 2018-11-20 DIAGNOSIS — N95.0 POSTMENOPAUSAL BLEEDING: Primary | ICD-10-CM

## 2018-11-20 PROCEDURE — 37000009 HC ANESTHESIA EA ADD 15 MINS: Performed by: OBSTETRICS & GYNECOLOGY

## 2018-11-20 PROCEDURE — 88305 TISSUE EXAM BY PATHOLOGIST: CPT | Mod: 26,,, | Performed by: PATHOLOGY

## 2018-11-20 PROCEDURE — 58558 HYSTEROSCOPY BIOPSY: CPT | Mod: ,,, | Performed by: OBSTETRICS & GYNECOLOGY

## 2018-11-20 PROCEDURE — 37000008 HC ANESTHESIA 1ST 15 MINUTES: Performed by: OBSTETRICS & GYNECOLOGY

## 2018-11-20 PROCEDURE — 63600175 PHARM REV CODE 636 W HCPCS: Performed by: OBSTETRICS & GYNECOLOGY

## 2018-11-20 PROCEDURE — C1782 MORCELLATOR: HCPCS | Performed by: OBSTETRICS & GYNECOLOGY

## 2018-11-20 PROCEDURE — 36000707: Performed by: OBSTETRICS & GYNECOLOGY

## 2018-11-20 PROCEDURE — 63600175 PHARM REV CODE 636 W HCPCS: Performed by: ANESTHESIOLOGY

## 2018-11-20 PROCEDURE — 36000706: Performed by: OBSTETRICS & GYNECOLOGY

## 2018-11-20 PROCEDURE — 71000015 HC POSTOP RECOV 1ST HR: Performed by: OBSTETRICS & GYNECOLOGY

## 2018-11-20 PROCEDURE — 25000003 PHARM REV CODE 250: Performed by: ANESTHESIOLOGY

## 2018-11-20 PROCEDURE — 25000003 PHARM REV CODE 250: Performed by: NURSE ANESTHETIST, CERTIFIED REGISTERED

## 2018-11-20 PROCEDURE — 71000033 HC RECOVERY, INTIAL HOUR: Performed by: OBSTETRICS & GYNECOLOGY

## 2018-11-20 PROCEDURE — 63600175 PHARM REV CODE 636 W HCPCS: Performed by: NURSE ANESTHETIST, CERTIFIED REGISTERED

## 2018-11-20 PROCEDURE — 88305 TISSUE EXAM BY PATHOLOGIST: CPT | Performed by: PATHOLOGY

## 2018-11-20 RX ORDER — ONDANSETRON 4 MG/1
8 TABLET, ORALLY DISINTEGRATING ORAL EVERY 8 HOURS PRN
Qty: 10 TABLET | Refills: 0 | Status: SHIPPED | OUTPATIENT
Start: 2018-11-20 | End: 2019-03-07

## 2018-11-20 RX ORDER — DIPHENHYDRAMINE HCL 25 MG
25 CAPSULE ORAL EVERY 4 HOURS PRN
Status: CANCELLED | OUTPATIENT
Start: 2018-11-20

## 2018-11-20 RX ORDER — MEPERIDINE HYDROCHLORIDE 50 MG/ML
12.5 INJECTION INTRAMUSCULAR; INTRAVENOUS; SUBCUTANEOUS ONCE AS NEEDED
Status: DISCONTINUED | OUTPATIENT
Start: 2018-11-20 | End: 2018-11-20 | Stop reason: HOSPADM

## 2018-11-20 RX ORDER — LIDOCAINE HCL/PF 100 MG/5ML
SYRINGE (ML) INTRAVENOUS
Status: DISCONTINUED | OUTPATIENT
Start: 2018-11-20 | End: 2018-11-20

## 2018-11-20 RX ORDER — PROPOFOL 10 MG/ML
VIAL (ML) INTRAVENOUS
Status: DISCONTINUED | OUTPATIENT
Start: 2018-11-20 | End: 2018-11-20

## 2018-11-20 RX ORDER — OXYCODONE HYDROCHLORIDE 5 MG/1
5 TABLET ORAL
Status: DISCONTINUED | OUTPATIENT
Start: 2018-11-20 | End: 2018-11-20 | Stop reason: HOSPADM

## 2018-11-20 RX ORDER — SODIUM CHLORIDE 0.9 % (FLUSH) 0.9 %
3 SYRINGE (ML) INJECTION
Status: DISCONTINUED | OUTPATIENT
Start: 2018-11-20 | End: 2018-11-20 | Stop reason: HOSPADM

## 2018-11-20 RX ORDER — HYDROMORPHONE HYDROCHLORIDE 2 MG/ML
1 INJECTION, SOLUTION INTRAMUSCULAR; INTRAVENOUS; SUBCUTANEOUS EVERY 4 HOURS PRN
Status: CANCELLED | OUTPATIENT
Start: 2018-11-20

## 2018-11-20 RX ORDER — FAMOTIDINE 20 MG/1
20 TABLET, FILM COATED ORAL
Status: COMPLETED | OUTPATIENT
Start: 2018-11-20 | End: 2018-11-20

## 2018-11-20 RX ORDER — SODIUM CHLORIDE, SODIUM LACTATE, POTASSIUM CHLORIDE, CALCIUM CHLORIDE 600; 310; 30; 20 MG/100ML; MG/100ML; MG/100ML; MG/100ML
INJECTION, SOLUTION INTRAVENOUS CONTINUOUS
Status: DISCONTINUED | OUTPATIENT
Start: 2018-11-20 | End: 2018-11-20 | Stop reason: HOSPADM

## 2018-11-20 RX ORDER — ONDANSETRON 2 MG/ML
INJECTION INTRAMUSCULAR; INTRAVENOUS
Status: DISCONTINUED | OUTPATIENT
Start: 2018-11-20 | End: 2018-11-20

## 2018-11-20 RX ORDER — FENTANYL CITRATE 50 UG/ML
25 INJECTION, SOLUTION INTRAMUSCULAR; INTRAVENOUS EVERY 5 MIN PRN
Status: DISCONTINUED | OUTPATIENT
Start: 2018-11-20 | End: 2018-11-20 | Stop reason: HOSPADM

## 2018-11-20 RX ORDER — IBUPROFEN 600 MG/1
600 TABLET ORAL EVERY 6 HOURS PRN
Status: CANCELLED | OUTPATIENT
Start: 2018-11-20

## 2018-11-20 RX ORDER — DIPHENHYDRAMINE HYDROCHLORIDE 50 MG/ML
25 INJECTION INTRAMUSCULAR; INTRAVENOUS EVERY 4 HOURS PRN
Status: CANCELLED | OUTPATIENT
Start: 2018-11-20

## 2018-11-20 RX ORDER — KETOROLAC TROMETHAMINE 30 MG/ML
INJECTION, SOLUTION INTRAMUSCULAR; INTRAVENOUS
Status: DISCONTINUED | OUTPATIENT
Start: 2018-11-20 | End: 2018-11-20

## 2018-11-20 RX ORDER — ACETAMINOPHEN 10 MG/ML
INJECTION, SOLUTION INTRAVENOUS
Status: DISCONTINUED | OUTPATIENT
Start: 2018-11-20 | End: 2018-11-20

## 2018-11-20 RX ORDER — ONDANSETRON 2 MG/ML
4 INJECTION INTRAMUSCULAR; INTRAVENOUS DAILY PRN
Status: DISCONTINUED | OUTPATIENT
Start: 2018-11-20 | End: 2018-11-20 | Stop reason: HOSPADM

## 2018-11-20 RX ORDER — FENTANYL CITRATE 50 UG/ML
INJECTION, SOLUTION INTRAMUSCULAR; INTRAVENOUS
Status: DISCONTINUED | OUTPATIENT
Start: 2018-11-20 | End: 2018-11-20

## 2018-11-20 RX ORDER — CEFAZOLIN SODIUM 1 G/3ML
2 INJECTION, POWDER, FOR SOLUTION INTRAMUSCULAR; INTRAVENOUS
Status: COMPLETED | OUTPATIENT
Start: 2018-11-20 | End: 2018-11-20

## 2018-11-20 RX ORDER — HYDROCODONE BITARTRATE AND ACETAMINOPHEN 5; 325 MG/1; MG/1
1 TABLET ORAL EVERY 4 HOURS PRN
Status: CANCELLED | OUTPATIENT
Start: 2018-11-20

## 2018-11-20 RX ORDER — IBUPROFEN 800 MG/1
800 TABLET ORAL EVERY 8 HOURS PRN
Qty: 20 TABLET | Refills: 0 | Status: SHIPPED | OUTPATIENT
Start: 2018-11-20 | End: 2019-03-06 | Stop reason: SDUPTHER

## 2018-11-20 RX ORDER — AMOXICILLIN 250 MG
1 CAPSULE ORAL DAILY
COMMUNITY
Start: 2018-11-20 | End: 2019-03-06

## 2018-11-20 RX ORDER — DEXAMETHASONE SODIUM PHOSPHATE 4 MG/ML
INJECTION, SOLUTION INTRA-ARTICULAR; INTRALESIONAL; INTRAMUSCULAR; INTRAVENOUS; SOFT TISSUE
Status: DISCONTINUED | OUTPATIENT
Start: 2018-11-20 | End: 2018-11-20

## 2018-11-20 RX ORDER — HYDROMORPHONE HYDROCHLORIDE 2 MG/ML
0.4 INJECTION, SOLUTION INTRAMUSCULAR; INTRAVENOUS; SUBCUTANEOUS EVERY 5 MIN PRN
Status: DISCONTINUED | OUTPATIENT
Start: 2018-11-20 | End: 2018-11-20 | Stop reason: HOSPADM

## 2018-11-20 RX ADMIN — CARBOXYMETHYLCELLULOSE SODIUM 2 DROP: 2.5 SOLUTION/ DROPS OPHTHALMIC at 10:11

## 2018-11-20 RX ADMIN — ONDANSETRON 4 MG: 2 INJECTION INTRAMUSCULAR; INTRAVENOUS at 10:11

## 2018-11-20 RX ADMIN — CEFAZOLIN 2 G: 330 INJECTION, POWDER, FOR SOLUTION INTRAMUSCULAR; INTRAVENOUS at 10:11

## 2018-11-20 RX ADMIN — FENTANYL CITRATE 25 MCG: 50 INJECTION INTRAMUSCULAR; INTRAVENOUS at 11:11

## 2018-11-20 RX ADMIN — SODIUM CHLORIDE, SODIUM LACTATE, POTASSIUM CHLORIDE, AND CALCIUM CHLORIDE: 600; 310; 30; 20 INJECTION, SOLUTION INTRAVENOUS at 09:11

## 2018-11-20 RX ADMIN — KETOROLAC TROMETHAMINE 30 MG: 30 INJECTION, SOLUTION INTRAMUSCULAR; INTRAVENOUS at 10:11

## 2018-11-20 RX ADMIN — PROPOFOL 300 MG: 10 INJECTION, EMULSION INTRAVENOUS at 10:11

## 2018-11-20 RX ADMIN — FENTANYL CITRATE 100 MCG: 50 INJECTION, SOLUTION INTRAMUSCULAR; INTRAVENOUS at 10:11

## 2018-11-20 RX ADMIN — FAMOTIDINE 20 MG: 20 TABLET ORAL at 08:11

## 2018-11-20 RX ADMIN — LIDOCAINE HYDROCHLORIDE 80 MG: 20 INJECTION, SOLUTION INTRAVENOUS at 10:11

## 2018-11-20 RX ADMIN — ACETAMINOPHEN 1000 MG: 10 INJECTION, SOLUTION INTRAVENOUS at 10:11

## 2018-11-20 RX ADMIN — DEXAMETHASONE SODIUM PHOSPHATE 8 MG: 4 INJECTION, SOLUTION INTRAMUSCULAR; INTRAVENOUS at 10:11

## 2018-11-20 NOTE — PLAN OF CARE
Jenny Caro has met all discharge criteria from Phase II. Vital Signs are stable, ambulating  without difficulty. Discharge instructions given, patient verbalized understanding. Discharged from facility via wheelchair in stable condition.

## 2018-11-20 NOTE — TRANSFER OF CARE
"Anesthesia Transfer of Care Note    Patient: Jenny Caro    Procedure(s) Performed: Procedure(s) (LRB):  HYSTEROSCOPY; TRUCLEAR RESECTION OF UTERINE POLYP (N/A)  DILATION AND CURETTAGE, UTERUS    Patient location: PACU    Anesthesia Type: general    Transport from OR: Transported from OR on 2-3 L/min O2 by NC with adequate spontaneous ventilation    Post pain: adequate analgesia    Post assessment: no apparent anesthetic complications and tolerated procedure well    Post vital signs: stable    Level of consciousness: awake and alert    Nausea/Vomiting: no nausea/vomiting    Complications: none    Transfer of care protocol was followed      Last vitals:   Visit Vitals  /85 (BP Location: Right arm, Patient Position: Lying)   Pulse 82   Temp 36.9 °C (98.4 °F) (Oral)   Resp 18   Ht 5' 3" (1.6 m)   Wt 108 kg (238 lb)   SpO2 98%   Breastfeeding? No   BMI 42.16 kg/m²     "

## 2018-11-20 NOTE — INTERVAL H&P NOTE
The patient has been examined and the H&P has been reviewed:    I concur with the findings and no changes have occurred since H&P was written.    Anesthesia/Surgery risks, benefits and alternative options discussed and understood by patient/family.          Active Hospital Problems    Diagnosis  POA    Complex endometrial hyperplasia [N85.01]  Yes     Uterus, endometrial biopsy:  -Complex endometrial hyperplasia without atypia.        Resolved Hospital Problems   No resolved problems to display.

## 2018-11-20 NOTE — ANESTHESIA POSTPROCEDURE EVALUATION
"Anesthesia Post Evaluation    Patient: Jenny Caro    Procedure(s) Performed: Procedure(s) (LRB):  HYSTEROSCOPY; TRUCLEAR RESECTION OF UTERINE POLYP (N/A)  DILATION AND CURETTAGE, UTERUS    Final Anesthesia Type: general  Patient location during evaluation: PACU  Patient participation: Yes- Able to Participate  Level of consciousness: oriented and awake  Post-procedure vital signs: reviewed and stable  Pain management: adequate  Airway patency: patent  PONV status at discharge: No PONV  Anesthetic complications: no      Cardiovascular status: hemodynamically stable  Respiratory status: unassisted, spontaneous ventilation and room air  Hydration status: euvolemic  Follow-up not needed.        Visit Vitals  BP (!) 142/81   Pulse 60   Temp 36.4 °C (97.5 °F) (Oral)   Resp 18   Ht 5' 3" (1.6 m)   Wt 108 kg (238 lb)   SpO2 97%   Breastfeeding? No   BMI 42.16 kg/m²       Pain/Megan Score: Pain Assessment Performed: Yes (11/20/2018 12:15 PM)  Presence of Pain: denies (11/20/2018 12:15 PM)  Pain Rating Prior to Med Admin: 7 (11/20/2018 11:20 AM)  Pain Rating Post Med Admin: 3 (11/20/2018 11:30 AM)  Megan Score: 9 (11/20/2018 11:17 AM)        "

## 2018-11-20 NOTE — DISCHARGE SUMMARY
[facility]  Discharge Summary  Gynecology      Admit Date: 11/20/2018    Discharge Date and Time: 11/20/2018    Attending Physician: Arvind Russell MD     Discharge Provider: Arvind Russell    Reason for Admission: Hysteroscopy D&C     Procedures Performed: Procedure(s) (LRB):  HYSTEROSCOPY; TRUCLEAR RESECTION OF UTERINE POLYP (N/A)  DILATION AND CURETTAGE, UTERUS    Hospital Course (synopsis of major diagnoses, care, treatment, and services provided during the course of the hospital stay): Patient was admitted for surgery. Following surgery she was transferred to the floor and underwent routine postoperative care. She tolerated po, ambulated without difficulty, and pain was well controlled. She remained afebrile throughout hospital course and her labs were stable. She was discharged to home in stable condition.      Consults: none    Significant Diagnostic Studies: Labs: CBC No results found for: WBC, HGB, HCT, MCV, PLT    Final Diagnoses:   Principal Problem: Complex endometrial hyperplasia   Secondary Diagnoses:   Active Hospital Problems    Diagnosis  POA    *Complex endometrial hyperplasia [N85.01]  Yes     Uterus, endometrial biopsy:  -Complex endometrial hyperplasia without atypia.      Postmenopausal bleeding [N95.0]  Yes      Resolved Hospital Problems   No resolved problems to display.       Discharged Condition: stable    Disposition: Home    Follow Up/Patient Instructions: 2 weeks    Medications:  Reconciled Home Medications:   Current Discharge Medication List      START taking these medications    Details   !! ibuprofen (ADVIL,MOTRIN) 800 MG tablet Take 1 tablet (800 mg total) by mouth every 8 (eight) hours as needed for Pain.  Qty: 20 tablet, Refills: 0      ondansetron (ZOFRAN-ODT) 4 MG TbDL Take 2 tablets (8 mg total) by mouth every 8 (eight) hours as needed.  Qty: 10 tablet, Refills: 0      senna-docusate 8.6-50 mg (PERICOLACE) 8.6-50 mg per tablet Take 1 tablet by mouth once  daily.       !! - Potential duplicate medications found. Please discuss with provider.      CONTINUE these medications which have NOT CHANGED    Details   acetaminophen (TYLENOL) 650 MG TbSR Take 650 mg by mouth as needed.      ascorbic acid, vitamin C, (VITAMIN C) 100 MG tablet Take 100 mg by mouth once daily.      BETA-CAROTENE,A, W-C & E/MIN (OCUVITE ORAL) Take by mouth.      biotin 1 mg Cap Take by mouth.      cetirizine (ZYRTEC) 10 MG tablet Take 10 mg by mouth once daily.      diphenhydrAMINE (BENADRYL) 50 MG capsule Take 50 mg by mouth nightly as needed for Itching.      LIALDA 1.2 gram TbEC       lisinopril 10 MG tablet Take 10 mg by mouth once daily.      loratadine (CLARITIN) 10 mg tablet Take 10 mg by mouth once daily.      omeprazole (PRILOSEC) 20 MG capsule       pravastatin (PRAVACHOL) 20 MG tablet       simethicone (GAS-X ORAL) Take by mouth.      !! ibuprofen (ADVIL,MOTRIN) 200 MG tablet Take 200 mg by mouth every 6 (six) hours as needed for Pain.      krill oil 500 mg Cap Take by mouth once daily.       !! - Potential duplicate medications found. Please discuss with provider.        Discharge Procedure Orders   Diet general     Other restrictions (specify):   Order Comments: Pelvic rest for 2 weeks.   No lifting greater than 20# for 2 weeks.     Call MD for:  temperature >100.4     Call MD for:  persistent nausea and vomiting     Call MD for:  severe uncontrolled pain     Call MD for:  redness, tenderness, or signs of infection (pain, swelling, redness, odor or green/yellow discharge around incision site)     Activity as tolerated     Follow-up Information     [facility], [facility] In 2 weeks.

## 2018-11-20 NOTE — OP NOTE
[facility] OBGYN  Operative Note    SUMMARY     Date of Procedure: 11/20/2018     Procedure: Procedure(s) (LRB):  HYSTEROSCOPY; TRUCLEAR RESECTION OF UTERINE POLYP (N/A)  DILATION AND CURETTAGE, UTERUS       Surgeon: Arvind uRssell MD.    Assisting Surgeon: None    Pre-Operative Diagnosis: Complex endometrial hyperplasia [N85.01]    Post-Operative Diagnosis: Post-Op Diagnosis Codes:     * Complex endometrial hyperplasia [N85.01]    Anesthesia: General    Technical Procedures Used: Hysteroscopic Polypectomy    Description of the Findings of the Procedure: Patient was taken to the operating room where general anesthesia was performed and found to be adequate. She was prepped and draped in the normal sterile fashion in the dorsal lithotomy position in abelino stirrups. Bladder was drained with straight catheter. Weighted speculum placed in the posterior vagina and the anterior vagina was elevated with mandeep retractor. The anterior lip of the cervix was grasped with the single tooth tenaculum.The dilators were used to dilate the cervix enough to allow introduction of the hysteroscope. The hysteroscope was introduced and a 1 cm polyp was noted in the right cornu of the uterus and a 2 cm polypoid mass which was vascular in nature was noted at the fundus.  Both tubal ostia  were visualized. TruClear Incisor was then used to remove all polypoid tissue from the uterine cavity. The uterine cavity was noted to be free of polyps or other anomalies.  Adequate hemostasis was noted. Bilateral tubal ostia were visualized.  Pictures were obtained and sent to medical records.  All instruments were then removed from the vagina. The patient tolerated procedure well. Sponge lap and needle counts were correct x 2.    Complications: No    Estimated Blood Loss (EBL): * No values recorded between 11/20/2018 10:25 AM and 11/20/2018 10:47 AM *           Specimens:   Specimen (12h ago, onward)    Start     Ordered    11/20/18  1047  Specimen to Pathology - Surgery  Once     Comments:  1. Uterine polypoid mass and uterine contents     Start Status   11/20/18 1047 In process       11/20/18 1047                  Condition: Good    Disposition: PACU - hemodynamically stable.    Attestation: There was no qualified resident available for this procedure.

## 2018-11-20 NOTE — DISCHARGE INSTRUCTIONS
Hysteroscopy  Hysteroscopy is a procedure that is done to see inside your uterus. It can help find the cause of problems in the uterus. This helps your health care provider decide on the best treatment. In some cases, it can be used to perform treatment. Hysteroscopy may be done in your health care provider's office or in the hospital.    Why might I need hysteroscopy?  Hysteroscopy may be done based on the results of other tests. It can help find the cause of problems. These can include:  · Unusually heavy or long menstrual periods  · Bleeding between periods  · Postmenopausal bleeding  · Trouble becoming pregnant (infertility) or carrying a pregnancy to term  · To locate an intrauterine device (IUD)  · To perform sterilization    What are the risks and complications of hysteroscopy?  Problems with the procedure are rare. But all procedures have risks. Risks of hysteroscopy include:  · Infection  · Bleeding  · Tearing of the uterine wall  · Damage to internal organs  · Scarring of the uterus  · Fluid overload  · Problems with anesthesia (the medication that prevents pain during the procedure)    What happens during a hysteroscopy?  · Youll lie on an exam table with your feet in stirrups.  · You may be given general anesthesia or medicines to help you relax or sleep. In some cases, an IV line will be put into a vein in your arm or hand. This line is then used to give fluids and medicines.  · A tool called a speculum is inserted into the vagina to hold it open. A tool called a dilator may be used to widen the cervix.  · Numbing medicine may be applied to the cervix.  · The hysteroscope (a long, thin lighted tube) is inserted through the vagina and into the uterus. It is used to see inside the uterus. Images of the uterus are viewed on a monitor.  · A gas or fluid may be injected into the uterus to expand it.  · Other tools may be put through the hysteroscope. These are used to take tissue samples, remove growths,  or place implants for the purpose of sterilization.    What happens after hysteroscopy?  · You may have cramps and bleeding for 24 hours after the procedure. This is normal. Use pads instead of tampons.  · Do not douche or use tampons until your health care provider says its OK.  · Do not use any vaginal medicines until you are told its OK.  · Ask your health care provider when its OK to have sex again.    When should I call my health care provider?  Call your health care provider if you have:  · Heavy bleeding (more than 1 pad an hour for 2 or more hours)  · A fever over 100.4°F (38.0°C)  · Increasing abdominal pain or tenderness  · Foul-smelling discharge     Follow-up care  Schedule a follow-up visit with your health care provider. Based on the results of your test, you may need more treatment. Be sure to follow instructions and keep your appointments.      Discharge Instructions: After Your Surgery  Youve just had surgery. During surgery, you were given medicine called anesthesia to keep you relaxed and free of pain. After surgery, you may have some pain or nausea. This is common. Here are some tips for feeling better and getting well after surgery.     Stay on schedule with your medicine.     Going home  Your healthcare provider will show you how to take care of yourself when you go home. He or she will also answer your questions. Have an adult family member or friend drive you home. For the first 24 hours after your surgery:    · Do not drive or use heavy equipment.  · Do not make important decisions or sign legal papers.  · Do not drink alcohol.  · Have someone stay with you, if needed. He or she can watch for problems and help keep you safe.    Be sure to go to all follow-up visits with your healthcare provider. And rest after your surgery for as long as your healthcare provider tells you to.    Coping with pain  If you have pain after surgery, pain medicine will help you feel better. Take it as told,  before pain becomes severe. Also, ask your healthcare provider or pharmacist about other ways to control pain. This might be with heat, ice, or relaxation. And follow any other instructions your surgeon or nurse gives you.    Tips for taking pain medicine  To get the best relief possible, remember these points:    · Pain medicines can upset your stomach. Taking them with a little food may help.  · Most pain relievers taken by mouth need at least 20 to 30 minutes to start to work.  · Taking medicine on a schedule can help you remember to take it. Try to time your medicine so that you can take it before starting an activity. This might be before you get dressed, go for a walk, or sit down for dinner.  · Constipation is a common side effect of pain medicines. Call your healthcare provider before taking any medicines such as laxatives or stool softeners to help ease constipation. Also ask if you should skip any foods. Drinking lots of fluids and eating foods such as fruits and vegetables that are high in fiber can also help. Remember, do not take laxatives unless your surgeon has prescribed them.  · Drinking alcohol and taking pain medicine can cause dizziness and slow your breathing. It can even be deadly. Do not drink alcohol while taking pain medicine.  · Pain medicine can make you react more slowly to things. Do not drive or run machinery while taking pain medicine.    Your healthcare provider may tell you to take acetaminophen to help ease your pain. Ask him or her how much you are supposed to take each day. Acetaminophen or other pain relievers may interact with your prescription medicines or other over-the-counter (OTC) medicines. Some prescription medicines have acetaminophen and other ingredients. Using both prescription and OTC acetaminophen for pain can cause you to overdose. Read the labels on your OTC medicines with care. This will help you to clearly know the list of ingredients, how much to take, and  any warnings. It may also help you not take too much acetaminophen. If you have questions or do not understand the information, ask your pharmacist or healthcare provider to explain it to you before you take the OTC medicine.    Managing nausea  Some people have an upset stomach after surgery. This is often because of anesthesia, pain, or pain medicine, or the stress of surgery. These tips will help you handle nausea and eat healthy foods as you get better. If you were on a special food plan before surgery, ask your healthcare provider if you should follow it while you get better. These tips may help:    · Do not push yourself to eat. Your body will tell you when to eat and how much.  · Start off with clear liquids and soup. They are easier to digest.  · Next try semi-solid foods, such as mashed potatoes, applesauce, and gelatin, as you feel ready.  · Slowly move to solid foods. Dont eat fatty, rich, or spicy foods at first.  · Do not force yourself to have 3 large meals a day. Instead eat smaller amounts more often.  · Take pain medicines with a small amount of solid food, such as crackers or toast, to avoid nausea.     Call your surgeon if  · You still have pain an hour after taking medicine. The medicine may not be strong enough.  · You feel too sleepy, dizzy, or groggy. The medicine may be too strong.  · You have side effects like nausea, vomiting, or skin changes, such as rash, itching, or hives.       If you have obstructive sleep apnea  You were given anesthesia medicine during surgery to keep you comfortable and free of pain. After surgery, you may have more apnea spells because of this medicine and other medicines you were given. The spells may last longer than usual.   At home:    · Keep using the continuous positive airway pressure (CPAP) device when you sleep. Unless your healthcare provider tells you not to, use it when you sleep, day or night. CPAP is a common device used to treat obstructive sleep  apnea.  · Talk with your provider before taking any pain medicine, muscle relaxants, or sedatives. Your provider will tell you about the possible dangers of taking these medicines.    Date Last Reviewed: 12/1/2016  © 8797-6139 [employer]. [address] [place] [address], [place] [address]. All rights reserved. This information is not intended as a substitute for professional medical care. Always follow your healthcare professional's instructions.    PLEASE FOLLOW ANY OTHER INSTRUCTIONS PROVIDED TO YOU BY DR. VARGHESE!

## 2018-11-28 ENCOUNTER — TELEPHONE (OUTPATIENT)
Dept: OBSTETRICS AND GYNECOLOGY | Facility: CLINIC | Age: 63
End: 2018-11-28

## 2018-11-28 NOTE — PROGRESS NOTES
Please call    Good news!   No cancer! Just benign polyp and benign tissue,   Good to go for surgery and regular hyst!  Dr Saravia will coorrdinate with my surgery scheduler when she is ready to schedule.    Tia:    Dr Saravia should be contacting you in the coming week if not reach out to them next week    PAth: ENDOMETRIUM: BENIGN POLYPOID FRAGMENTS OF INACTIVE ENDOMETRIUM.  Note: Multiple fragments of an endometrial polyp consist mostly polyp-like fragments with dilated endometrial  glands surrounded by focally fibrotic stroma. There are no complex or atypical changes.

## 2018-11-28 NOTE — TELEPHONE ENCOUNTER
Pt notified of benign polyp and benign tissue per Dr. Russell, pt very excited and very relieved! Notified she is good to go for a regular hysterectomy and that Dr. Saravia office should be reaching out to her this week to schedule. Advised pt per Tia that is she has not heard from them by next week to give them a call. Pt verbalized understanding.

## 2018-11-28 NOTE — TELEPHONE ENCOUNTER
----- Message from Arvind Russell MD sent at 11/28/2018  7:54 AM CST -----  Please call    Good news!   No cancer! Just benign polyp and benign tissue,   Good to go for surgery and regular hyst!  Dr Saravia will coorrdinate with my surgery scheduler when she is ready to schedule.    Tia:    Dr Saravia should be contacting you in the coming week if not reach out to them next week    PAth: ENDOMETRIUM: BENIGN POLYPOID FRAGMENTS OF INACTIVE ENDOMETRIUM.  Note: Multiple fragments of an endometrial polyp consist mostly polyp-like fragments with dilated endometrial  glands surrounded by focally fibrotic stroma. There are no complex or atypical changes.

## 2018-12-11 ENCOUNTER — TELEPHONE (OUTPATIENT)
Dept: UROGYNECOLOGY | Facility: CLINIC | Age: 63
End: 2018-12-11

## 2018-12-11 NOTE — TELEPHONE ENCOUNTER
Attempted to reach patient to discuss scheduling surgery.  VM left to call office.  Teresa Sanchez, MCKAYLAP-BC

## 2018-12-12 ENCOUNTER — TELEPHONE (OUTPATIENT)
Dept: UROGYNECOLOGY | Facility: CLINIC | Age: 63
End: 2018-12-12

## 2019-01-29 ENCOUNTER — TELEPHONE (OUTPATIENT)
Dept: UROGYNECOLOGY | Facility: CLINIC | Age: 64
End: 2019-01-29

## 2019-01-29 NOTE — TELEPHONE ENCOUNTER
Spoke to pt, surgery confirmed 3/13/2019 with Dr. Lozano.  Pre-admit and consent signing scheduled 3/4. Pt aware  Pt does not need PCP clearance.

## 2019-02-12 ENCOUNTER — TELEPHONE (OUTPATIENT)
Dept: UROGYNECOLOGY | Facility: CLINIC | Age: 64
End: 2019-02-12

## 2019-02-12 DIAGNOSIS — N81.2 UTEROVAGINAL PROLAPSE, INCOMPLETE: Primary | ICD-10-CM

## 2019-02-28 ENCOUNTER — TELEPHONE (OUTPATIENT)
Dept: UROGYNECOLOGY | Facility: CLINIC | Age: 64
End: 2019-02-28

## 2019-02-28 NOTE — TELEPHONE ENCOUNTER
R/s pt's pre op to 3/6 at 9am and she's to go to preadmit after. Pt voiced understanding and call ended.

## 2019-02-28 NOTE — TELEPHONE ENCOUNTER
----- Message from Sanjana Stubbs sent at 2/28/2019  9:26 AM CST -----  Contact: TWIN COOL [[id number]]  Name of Who is Calling: TWIN COOL [[id number]]    What is the request in detail: patient would like to reschedule both pre-op appts. Please call     Can the clinic reply by [facility]: no    What Number to Call Back if not in [facility]: [phone]

## 2019-03-06 ENCOUNTER — HOSPITAL ENCOUNTER (OUTPATIENT)
Dept: PREADMISSION TESTING | Facility: OTHER | Age: 64
Discharge: HOME | End: 2019-03-06
Attending: OBSTETRICS & GYNECOLOGY
Payer: COMMERCIAL

## 2019-03-06 ENCOUNTER — ANESTHESIA EVENT (OUTPATIENT)
Dept: SURGERY | Facility: OTHER | Age: 64
End: 2019-03-06
Payer: COMMERCIAL

## 2019-03-06 ENCOUNTER — OFFICE VISIT (OUTPATIENT)
Dept: UROGYNECOLOGY | Facility: CLINIC | Age: 64
End: 2019-03-06
Payer: COMMERCIAL

## 2019-03-06 VITALS
SYSTOLIC BLOOD PRESSURE: 110 MMHG | WEIGHT: 243.81 LBS | HEIGHT: 63 IN | DIASTOLIC BLOOD PRESSURE: 80 MMHG | BODY MASS INDEX: 43.2 KG/M2

## 2019-03-06 VITALS
TEMPERATURE: 99 F | OXYGEN SATURATION: 95 % | BODY MASS INDEX: 43.2 KG/M2 | SYSTOLIC BLOOD PRESSURE: 140 MMHG | DIASTOLIC BLOOD PRESSURE: 90 MMHG | HEART RATE: 80 BPM | HEIGHT: 63 IN | WEIGHT: 243.81 LBS

## 2019-03-06 DIAGNOSIS — N81.11 CYSTOCELE, MIDLINE: ICD-10-CM

## 2019-03-06 DIAGNOSIS — N81.6 POSTERIOR VAGINAL WALL PROLAPSE: ICD-10-CM

## 2019-03-06 DIAGNOSIS — N81.2 UTEROVAGINAL PROLAPSE, INCOMPLETE: ICD-10-CM

## 2019-03-06 DIAGNOSIS — Z01.818 PREOP TESTING: Primary | ICD-10-CM

## 2019-03-06 DIAGNOSIS — Z01.818 PREOPERATIVE EXAM FOR GYNECOLOGIC SURGERY: Primary | ICD-10-CM

## 2019-03-06 DIAGNOSIS — Z01.818 PREOP TESTING: ICD-10-CM

## 2019-03-06 LAB
ABO + RH BLD: NORMAL
ANION GAP SERPL CALC-SCNC: 7 MMOL/L
BASOPHILS # BLD AUTO: 0.02 K/UL
BASOPHILS NFR BLD: 0.3 %
BLD GP AB SCN CELLS X3 SERPL QL: NORMAL
BUN SERPL-MCNC: 23 MG/DL
CALCIUM SERPL-MCNC: 9.8 MG/DL
CHLORIDE SERPL-SCNC: 103 MMOL/L
CO2 SERPL-SCNC: 30 MMOL/L
CREAT SERPL-MCNC: 0.8 MG/DL
DIFFERENTIAL METHOD: ABNORMAL
EOSINOPHIL # BLD AUTO: 0.1 K/UL
EOSINOPHIL NFR BLD: 2 %
ERYTHROCYTE [DISTWIDTH] IN BLOOD BY AUTOMATED COUNT: 14.4 %
EST. GFR  (AFRICAN AMERICAN): >60 ML/MIN/1.73 M^2
EST. GFR  (NON AFRICAN AMERICAN): >60 ML/MIN/1.73 M^2
GLUCOSE SERPL-MCNC: 68 MG/DL
HCT VFR BLD AUTO: 42 %
HGB BLD-MCNC: 13.3 G/DL
LYMPHOCYTES # BLD AUTO: 1.8 K/UL
LYMPHOCYTES NFR BLD: 25.7 %
MCH RBC QN AUTO: 30.1 PG
MCHC RBC AUTO-ENTMCNC: 31.7 G/DL
MCV RBC AUTO: 95 FL
MONOCYTES # BLD AUTO: 0.4 K/UL
MONOCYTES NFR BLD: 6.1 %
NEUTROPHILS # BLD AUTO: 4.7 K/UL
NEUTROPHILS NFR BLD: 65.6 %
PLATELET # BLD AUTO: 288 K/UL
PMV BLD AUTO: 10.5 FL
POTASSIUM SERPL-SCNC: 4.8 MMOL/L
RBC # BLD AUTO: 4.42 M/UL
SODIUM SERPL-SCNC: 140 MMOL/L
WBC # BLD AUTO: 7.08 K/UL

## 2019-03-06 PROCEDURE — 99499 UNLISTED E&M SERVICE: CPT | Mod: S$GLB,,, | Performed by: NURSE PRACTITIONER

## 2019-03-06 PROCEDURE — 93005 ELECTROCARDIOGRAM TRACING: CPT

## 2019-03-06 PROCEDURE — 86901 BLOOD TYPING SEROLOGIC RH(D): CPT

## 2019-03-06 PROCEDURE — 93010 EKG 12-LEAD: ICD-10-PCS | Mod: ,,, | Performed by: INTERNAL MEDICINE

## 2019-03-06 PROCEDURE — 99999 PR PBB SHADOW E&M-EST. PATIENT-LVL III: ICD-10-PCS | Mod: PBBFAC,,, | Performed by: NURSE PRACTITIONER

## 2019-03-06 PROCEDURE — 99499 NO LOS: ICD-10-PCS | Mod: S$GLB,,, | Performed by: NURSE PRACTITIONER

## 2019-03-06 PROCEDURE — 85025 COMPLETE CBC W/AUTO DIFF WBC: CPT

## 2019-03-06 PROCEDURE — 80048 BASIC METABOLIC PNL TOTAL CA: CPT

## 2019-03-06 PROCEDURE — 93010 ELECTROCARDIOGRAM REPORT: CPT | Mod: ,,, | Performed by: INTERNAL MEDICINE

## 2019-03-06 PROCEDURE — 99999 PR PBB SHADOW E&M-EST. PATIENT-LVL III: CPT | Mod: PBBFAC,,, | Performed by: NURSE PRACTITIONER

## 2019-03-06 RX ORDER — PREGABALIN 75 MG/1
150 CAPSULE ORAL
Status: CANCELLED | OUTPATIENT
Start: 2019-03-06 | End: 2019-03-06

## 2019-03-06 RX ORDER — OXYCODONE AND ACETAMINOPHEN 5; 325 MG/1; MG/1
1 TABLET ORAL EVERY 4 HOURS PRN
Qty: 30 TABLET | Refills: 0 | Status: SHIPPED | OUTPATIENT
Start: 2019-03-06 | End: 2019-03-07

## 2019-03-06 RX ORDER — MIDAZOLAM HYDROCHLORIDE 1 MG/ML
2 INJECTION INTRAMUSCULAR; INTRAVENOUS
Status: CANCELLED | OUTPATIENT
Start: 2019-03-06 | End: 2019-03-06

## 2019-03-06 RX ORDER — ACETAMINOPHEN 500 MG
1000 TABLET ORAL
Status: CANCELLED | OUTPATIENT
Start: 2019-03-06 | End: 2019-03-06

## 2019-03-06 RX ORDER — LIDOCAINE HYDROCHLORIDE 10 MG/ML
0.5 INJECTION, SOLUTION EPIDURAL; INFILTRATION; INTRACAUDAL; PERINEURAL ONCE
Status: CANCELLED | OUTPATIENT
Start: 2019-03-06 | End: 2019-03-06

## 2019-03-06 RX ORDER — SODIUM CHLORIDE, SODIUM LACTATE, POTASSIUM CHLORIDE, CALCIUM CHLORIDE 600; 310; 30; 20 MG/100ML; MG/100ML; MG/100ML; MG/100ML
INJECTION, SOLUTION INTRAVENOUS CONTINUOUS
Status: CANCELLED | OUTPATIENT
Start: 2019-03-06

## 2019-03-06 RX ORDER — DOCUSATE SODIUM 100 MG/1
100 CAPSULE, LIQUID FILLED ORAL 2 TIMES DAILY
Qty: 60 CAPSULE | Refills: 11 | Status: SHIPPED | OUTPATIENT
Start: 2019-03-06 | End: 2019-03-07

## 2019-03-06 RX ORDER — IBUPROFEN 600 MG/1
600 TABLET ORAL EVERY 6 HOURS PRN
Qty: 30 TABLET | Refills: 1 | Status: SHIPPED | OUTPATIENT
Start: 2019-03-06 | End: 2019-05-08

## 2019-03-06 RX ORDER — ASPIRIN 81 MG/1
81 TABLET ORAL DAILY
COMMUNITY

## 2019-03-06 NOTE — H&P (VIEW-ONLY)
Urogyn follow up  03/06/2019  .  [facility] [address]  [address] [id number]    Jenny Caro  [id number]  1955      Jenny Caro is a 63 y.o.  here for a urogyn preop visit.  Last HPI from 11/14/2018  History of Present Illness  HPI 63 Y O F P 2  has a past medical history of Abnormal Pap smear of cervix (2009), GERD (gastroesophageal reflux disease), HPV in female, Hyperlipidemia, Hypertension, and Ulcerative colitis. Referred by Dr. Russell for evaluation of pelvic organ prolapse. She was seen Dr. Chapman the past  but decided not to proceed with pelvic floor PT as recommended.    She has a history of UC diagnosed in 2007 and managed by Dr. Mcadams with [facility]. Symptoms have been mild. She does have some issues with gas but gas-x has helped with her symptoms. She is scheduled to have D&C hysteroscopy with Dr. Russell  11/20/2018. She seems to want to proceed with surgical intervention.      [facility]      Question 11/14/2018  8:55 AM CST - Filed by Patient on 11/14/2018   General Urogynecology: Are you experiencing the following?     Dysuria (painful urination) No   Nocturia:  waking up at night to empty your bladder  Yes   If you answered yes to the previous question, how many times does this happen per night? 1-2   Enuresis (urine loss during sleep) No   Dribbling urine after you urinate Yes   Hematuria (urine appears red) No   Type of stream Weak   Urinary frequency: How often a day are you going to the bathroom per day?  Less than 10   Urinary Tract Infections: How many Urinary Tract Infections have you had in the past year? Two (2) in six (6) months   If you have had a UTI in the past year, what treatments have you had so far?  Prophylactic antibiotics   Urinary Incontinence (General): Are you experiencing the following?     Past consultation for incontinence: Have you ever seen someone for the evaluation of incontinence? No   If you answered yes to the  "previous question, please select all the therapies you have tried.  None of the above   Please note the effectiveness of the therapies.     Need to wear protection to keep clothes dry  Yes   If you answered yes to the previous question, please carlton the protection you use.  Poise   If you wear protection, how much wetness is typically on each pad? Scant   If you wear protection, how often do you have to change per day, if applicable?  0   Stress Symptoms: Are you experiencing the following?     Leakage of urine with cough, laugh and/or sneeze No   If you answered yes to the previous question, what is the frequency in days, weeks and/or months? Never   Leakage of urine with sex No   Leakage of urine with bending/ lifting No   Leakage of urine with briskly walking or jogging No   If you lose urine for any other reason not previously mentioned, please note it below, if applicable.      Urge Symptoms: Are you experiencing the following?     Urgency ("got to go" feeling) No   Urge: How frequently do you feel an urge to urinate (feeling like you "gotta go" to the bathroom and can't wait) Never   Do you experience a leakage of urine when you have a feeling of urgency?  No   Leakage of urine when unaware Yes   Past use of anticholinergics (medications used to treat overactive bladder) No   If you answered yes to the previous question, please carlton the anticholinergics you have used:      Have you ever used Mirbetriq (aka Mirabegron)?  No   Prolapse Symptoms: Are you experiencing any of the following?      Falling out/ Bulging/ Heaviness in the vagina Yes   Vaginal/ Abdominal Pain/ Pressure No   Need to strain/ Push to void No   Need to wait on the toilet before you void No   Unusual position to urinate (using your hands to push back the vaginal bulge) No   Sensation of incomplete emptying Yes   Past use of pessary device No   If you answered yes to the previous question, please list the devices you have used below.      Bowel " Symptoms: Are you experiencing any of the following?     Constipation No   Diarrhea  No   Hematochezia (bloody stool) No   Incomplete evacuation of stool Yes   Involuntary loss of formed stool Yes   Fecal smearing/urgency Yes   Involuntary loss of gas Yes   Vaginal Symptoms: Are you experiencing any of the following?      Abnormal vaginal bleeding  No   Vaginal dryness No   Sexually active  Yes   Dyspareunia (painful intercourse) No   Estrogen use  No              Past Medical History:   Diagnosis Date    Abnormal Pap smear of cervix 2009    LEEP 2009, mild dysplasia normal paps since    Arthritis     ankles  born with club feet    GERD (gastroesophageal reflux disease)     HPV in female     Hyperlipidemia     Hypertension     Ulcerative colitis        Past Surgical History:   Procedure Laterality Date    CERVICAL BIOPSY  W/ LOOP ELECTRODE EXCISION  2009    DILATION AND CURETTAGE, UTERUS  11/20/2018    [facility]    gastric sleeve  08/2015    HYSTEROSCOPY; TRUCLEAR RESECTION OF UTERINE POLYP N/A 11/20/2018    [facility]    LAPAROSCOPIC GASTRIC BANDING  2006       Current Outpatient Medications   Medication Sig    acetaminophen (TYLENOL) 650 MG TbSR Take 650 mg by mouth as needed.    ascorbic acid, vitamin C, (VITAMIN C) 100 MG tablet Take 100 mg by mouth once daily.    BETA-CAROTENE,A, W-C & E/MIN (OCUVITE ORAL) Take by mouth.    biotin 1 mg Cap Take by mouth.    cetirizine (ZYRTEC) 10 MG tablet Take 10 mg by mouth once daily.    diphenhydrAMINE (BENADRYL) 50 MG capsule Take 50 mg by mouth nightly as needed for Itching.    krill oil 500 mg Cap Take by mouth once daily.    LIALDA 1.2 gram TbEC 2.4 g daily with breakfast.     lisinopril 10 MG tablet Take 10 mg by mouth once daily.    loratadine (CLARITIN) 10 mg tablet Take 10 mg by mouth once daily.    omeprazole (PRILOSEC) 20 MG capsule 20 mg once daily.     pravastatin (PRAVACHOL) 20  "MG tablet 40 mg every evening.     simethicone (GAS-X ORAL) Take by mouth once daily.     aspirin (ECOTRIN) 81 MG EC tablet Take 81 mg by mouth once daily.    ibuprofen (ADVIL,MOTRIN) 600 MG tablet Take 1 tablet (600 mg total) by mouth every 6 (six) hours as needed for Pain.     No current facility-administered medications for this visit.        ROS:  As per HPI.      Exam  /80 (BP Location: Right arm, Patient Position: Sitting, BP Method: Large (Manual))   Ht 5' 3" (1.6 m)   Wt 110.6 kg (243 lb 13.3 oz)   BMI 43.19 kg/m²   General: alert and oriented, no acute distress  Respiratory: normal respiratory effort  Abd: soft, non-tender, non-distended    Pelvic--deferred      Impression  1. Preoperative exam for gynecologic surgery     2. Uterovaginal prolapse, incomplete     3. Cystocele, midline     4. Posterior vaginal wall prolapse       We reviewed the above issues and discussed options for short-term versus long-term management of her problems.   Plan:     1. Patient consented with Dr. Lozano for laparoscopic sacrocolpopexy with synthetic mesh, possible anterior/posterior repair with perineorrhaphy, possible laparotomy, placement of synthetic midurethral sling, and cystourethroscopy.   R/B/A reviewed. Specific risks reviewed include:  infection, bleeding, need for blood transfusion, damage to surrounding structures, anesthesia risks, death, heart attack, stroke, mesh erosion/extrusion, pain, dyspareunia, urinary retention, voiding dysfunction, urinary incontinence, exacerbation of urinary urge incontinence, and need for further surgeries.  We reviewed potential for failure of POP defect repair and need for future surgery, with no way of predicting risk.  She understands success rate of ASC approaches 85%.  Success rate of midurethral sling for LEW was reviewed as 80-85%, and she understands that this will not necessarily impact other types of urinary incontinence.  Alternatives reviewed include: " pessary/PT for POP and pessary/periurethral injections/PT/medication for LEW.    2. T&S, urine HCG on DOS  3. VTE Prophylaxis:  lovenox 40 mg sq daily + SCDs  4. Patient instructed on Magnesium citrate and chlorahexadine/dial soap prep to perform day before & AM of surgery.   5. Proceed to OR for above-mentioned procedure.  Patient seen in uro-gynecology clinic today regarding participation in (IRB #[id number] PI: Ned).   The study was explained to the patient in detail, and the Informed Consent was reviewed and discussed with the patient.   All risks, benefits, procedures were discussed.   Adequate time was given for the patient to ask questions and receive answers.   Patient confirmed that all questions had been answered and verbalized desire to participate in study, understanding that participation is voluntary and can withdraw at any time.   Pt was informed that participation in this study would not preclude her from participating in any drug study/other research if offered.   Patient signed Informed Consent, and a signed copy was provided.  This Informed Consent process was conducted prior to initiation of any study procedures.      30 minutes were spent in face to face time with this patient  90 % of this time was spent in counseling and/or coordination of care  JEANNE Humphrey-BC [facility]  Division of Female Pelvic Medicine and Reconstructive Surgery  Department of Obstetrics & Gynecology

## 2019-03-06 NOTE — ANESTHESIA PREPROCEDURE EVALUATION
03/06/2019  Jenny Caro is a 63 y.o., female.    Anesthesia Evaluation    I have reviewed the Patient Summary Reports.    I have reviewed the Nursing Notes.   I have reviewed the Medications.     Review of Systems  Anesthesia Hx:  Post op hair loss History of prior surgery of interest to airway management or planning: Previous anesthesia: General Gastric sleeve 2014 with general anesthesia.  Denies Family Hx of Anesthesia complications.   Denies Personal Hx of Anesthesia complications.   Social:  Non-Smoker    Hematology/Oncology:  Hematology Normal   Oncology Normal     EENT/Dental:EENT/Dental Normal   Cardiovascular:   Hypertension, well controlled PVD    Pulmonary:  Pulmonary Normal    Renal/:  Renal/ Normal     Hepatic/GI:   PUD, GERD, well controlled    Musculoskeletal:   Arthritis     Neurological:  Neurology Normal    Endocrine:  Endocrine Normal    Dermatological:  Skin Normal    Psych:  Psychiatric Normal           Physical Exam  General:  Morbid Obesity    Airway/Jaw/Neck:  Airway Findings: Mouth Opening: Normal Tongue: Normal  Mallampati: II      Dental:  Dental Findings: molar caps        Mental Status:  Mental Status Findings:  Cooperative, Alert and Oriented         Anesthesia Plan  Type of Anesthesia, risks & benefits discussed:  Anesthesia Type:  general  Patient's Preference:   Intra-op Monitoring Plan: standard ASA monitors  Intra-op Monitoring Plan Comments:   Post Op Pain Control Plan: multimodal analgesia  Post Op Pain Control Plan Comments:   Induction:   IV  Beta Blocker:         Informed Consent: Patient understands risks and agrees with Anesthesia plan.  Questions answered. Anesthesia consent signed with patient.  ASA Score: 3     Day of Surgery Review of History & Physical:    H&P update referred to the surgeon.     Anesthesia Plan Notes: Hair loss post op prev surg,massage  head and turn in surg        Ready For Surgery From Anesthesia Perspective.

## 2019-03-06 NOTE — DISCHARGE INSTRUCTIONS
PRE-ADMIT TESTING -  [phone]    [address]  [place] [facility]          Your surgery has been scheduled at [facility]. We are pleased to have the opportunity to serve you. For Further Information please call [phone].    On the day of surgery please report to the Information Desk on the 1st floor.    · CONTACT YOUR PHYSICIAN'S OFFICE THE DAY PRIOR TO YOUR SURGERY TO OBTAIN YOUR ARRIVAL TIME.     · The evening before surgery do not eat anything after 9 p.m. ( this includes hard candy, chewing gum and mints).  You may only have GATORADE, POWERADE AND WATER  from 9 p.m. until you leave your home.   DO NOT DRINK ANY LIQUIDS ON THE WAY TO THE HOSPITAL.      SPECIAL MEDICATION INSTRUCTIONS: TAKE medications checked off by the Anesthesiologist on your Medication List.    Angiogram Patients: Take medications as instructed by your physician, including aspirin.     Surgery Patients:    If you take ASPIRIN - Your PHYSICIAN/SURGEON will need to inform you IF/OR when you need to stop taking aspirin prior to your surgery.     Do Not take any medications containing IBUPROFEN.  Do Not Wear any make-up or dark nail polish   (especially eye make-up) to surgery. If you come to surgery with makeup on you will be required to remove the makeup or nail polish.    Do not shave your surgical area at least 5 days prior to your surgery. The surgical prep will be performed at the hospital according to Infection Control regulations.    Leave all valuables at home.   Do Not wear any jewelry or watches, including any metal in body piercings. Jewelry must be removed prior to coming to the hospital.  There is a possibility that rings that are unable to be removed may be cut off if they are on the surgical extremity.    Contact Lens must be removed before surgery. Either do not wear the contact lens or bring a case and solution for storage.  Please bring a container for eyeglasses or dentures as required.  Bring  any paperwork your physician has provided, such as consent forms,  history and physicals, doctor's orders, etc.   Bring comfortable clothes that are loose fitting to wear upon discharge. Take into consideration the type of surgery being performed.  Maintain your diet as advised per your physician the day prior to surgery.      Adequate rest the night before surgery is advised.   Park in the Parking lot behind the hospital or in the [facility] across the street from the parking lot. Parking is complimentary.  If you will be discharged the same day as your procedure, please arrange for a responsible adult to drive you home or to accompany you if traveling by taxi.   YOU WILL NOT BE PERMITTED TO DRIVE OR TO LEAVE THE HOSPITAL ALONE AFTER SURGERY.   It is strongly recommended that you arrange for someone to remain with you for the first 24 hrs following your surgery.       Thank you for your cooperation.  The Staff of [facility].        Bathing Instructions                                                                 Please shower the evening before and morning of your procedure with    ANTIBACTERIAL SOAP. ( DIAL, etc )  Concentrate on the surgical area   for at least 3 minutes and rinse completely. Dry off as usual.   Do not use any deodorant, powder, body lotions, perfume, after shave or    cologne.

## 2019-03-06 NOTE — PROGRESS NOTES
Urogyn follow up  03/06/2019  .  [facility]  [address] [address]    Jenny Caro  [id number]  1955      Jenny Caro is a 63 y.o.  here for a urogyn preop visit.  Last HPI from 11/14/2018  History of Present Illness  HPI 63 Y O F P 2  has a past medical history of Abnormal Pap smear of cervix (2009), GERD (gastroesophageal reflux disease), HPV in female, Hyperlipidemia, Hypertension, and Ulcerative colitis. Referred by Dr. Russell for evaluation of pelvic organ prolapse. She was seen Dr. Chapman the past  but decided not to proceed with pelvic floor PT as recommended.    She has a history of UC diagnosed in 2007 and managed by Dr. Mcadams with Lialda. Symptoms have been mild. She does have some issues with gas but gas-x has helped with her symptoms. She is scheduled to have D&C hysteroscopy with Dr. Russell  11/20/2018. She seems to want to proceed with surgical intervention.      [facility]      Question 11/14/2018  8:55 AM CST - Filed by Patient on 11/14/2018   General Urogynecology: Are you experiencing the following?     Dysuria (painful urination) No   Nocturia:  waking up at night to empty your bladder  Yes   If you answered yes to the previous question, how many times does this happen per night? 1-2   Enuresis (urine loss during sleep) No   Dribbling urine after you urinate Yes   Hematuria (urine appears red) No   Type of stream Weak   Urinary frequency: How often a day are you going to the bathroom per day?  Less than 10   Urinary Tract Infections: How many Urinary Tract Infections have you had in the past year? Two (2) in six (6) months   If you have had a UTI in the past year, what treatments have you had so far?  Prophylactic antibiotics   Urinary Incontinence (General): Are you experiencing the following?     Past consultation for incontinence: Have you ever seen someone for the evaluation of incontinence? No   If you answered yes to the  "previous question, please select all the therapies you have tried.  None of the above   Please note the effectiveness of the therapies.     Need to wear protection to keep clothes dry  Yes   If you answered yes to the previous question, please carlton the protection you use.  Poise   If you wear protection, how much wetness is typically on each pad? Scant   If you wear protection, how often do you have to change per day, if applicable?  0   Stress Symptoms: Are you experiencing the following?     Leakage of urine with cough, laugh and/or sneeze No   If you answered yes to the previous question, what is the frequency in days, weeks and/or months? Never   Leakage of urine with sex No   Leakage of urine with bending/ lifting No   Leakage of urine with briskly walking or jogging No   If you lose urine for any other reason not previously mentioned, please note it below, if applicable.      Urge Symptoms: Are you experiencing the following?     Urgency ("got to go" feeling) No   Urge: How frequently do you feel an urge to urinate (feeling like you "gotta go" to the bathroom and can't wait) Never   Do you experience a leakage of urine when you have a feeling of urgency?  No   Leakage of urine when unaware Yes   Past use of anticholinergics (medications used to treat overactive bladder) No   If you answered yes to the previous question, please carlton the anticholinergics you have used:      Have you ever used Mirbetriq (aka Mirabegron)?  No   Prolapse Symptoms: Are you experiencing any of the following?      Falling out/ Bulging/ Heaviness in the vagina Yes   Vaginal/ Abdominal Pain/ Pressure No   Need to strain/ Push to void No   Need to wait on the toilet before you void No   Unusual position to urinate (using your hands to push back the vaginal bulge) No   Sensation of incomplete emptying Yes   Past use of pessary device No   If you answered yes to the previous question, please list the devices you have used below.      Bowel " Symptoms: Are you experiencing any of the following?     Constipation No   Diarrhea  No   Hematochezia (bloody stool) No   Incomplete evacuation of stool Yes   Involuntary loss of formed stool Yes   Fecal smearing/urgency Yes   Involuntary loss of gas Yes   Vaginal Symptoms: Are you experiencing any of the following?      Abnormal vaginal bleeding  No   Vaginal dryness No   Sexually active  Yes   Dyspareunia (painful intercourse) No   Estrogen use  No              Past Medical History:   Diagnosis Date    Abnormal Pap smear of cervix 2009    LEEP 2009, mild dysplasia normal paps since    Arthritis     ankles  born with club feet    GERD (gastroesophageal reflux disease)     HPV in female     Hyperlipidemia     Hypertension     Ulcerative colitis        Past Surgical History:   Procedure Laterality Date    CERVICAL BIOPSY  W/ LOOP ELECTRODE EXCISION  2009    DILATION AND CURETTAGE, UTERUS  11/20/2018    [facility]    gastric sleeve  08/2015    HYSTEROSCOPY; TRUCLEAR RESECTION OF UTERINE POLYP N/A 11/20/2018    [facility]    LAPAROSCOPIC GASTRIC BANDING  2006       Current Outpatient Medications   Medication Sig    acetaminophen (TYLENOL) 650 MG TbSR Take 650 mg by mouth as needed.    ascorbic acid, vitamin C, (VITAMIN C) 100 MG tablet Take 100 mg by mouth once daily.    BETA-CAROTENE,A, W-C & E/MIN (OCUVITE ORAL) Take by mouth.    biotin 1 mg Cap Take by mouth.    cetirizine (ZYRTEC) 10 MG tablet Take 10 mg by mouth once daily.    diphenhydrAMINE (BENADRYL) 50 MG capsule Take 50 mg by mouth nightly as needed for Itching.    krill oil 500 mg Cap Take by mouth once daily.    LIALDA 1.2 gram TbEC 2.4 g daily with breakfast.     lisinopril 10 MG tablet Take 10 mg by mouth once daily.    loratadine (CLARITIN) 10 mg tablet Take 10 mg by mouth once daily.    omeprazole (PRILOSEC) 20 MG capsule 20 mg once daily.     pravastatin (PRAVACHOL) 20  "MG tablet 40 mg every evening.     simethicone (GAS-X ORAL) Take by mouth once daily.     aspirin (ECOTRIN) 81 MG EC tablet Take 81 mg by mouth once daily.    ibuprofen (ADVIL,MOTRIN) 600 MG tablet Take 1 tablet (600 mg total) by mouth every 6 (six) hours as needed for Pain.     No current facility-administered medications for this visit.        ROS:  As per HPI.      Exam  /80 (BP Location: Right arm, Patient Position: Sitting, BP Method: Large (Manual))   Ht 5' 3" (1.6 m)   Wt 110.6 kg (243 lb 13.3 oz)   BMI 43.19 kg/m²   General: alert and oriented, no acute distress  Respiratory: normal respiratory effort  Abd: soft, non-tender, non-distended    Pelvic--deferred      Impression  1. Preoperative exam for gynecologic surgery     2. Uterovaginal prolapse, incomplete     3. Cystocele, midline     4. Posterior vaginal wall prolapse       We reviewed the above issues and discussed options for short-term versus long-term management of her problems.   Plan:     1. Patient consented with Dr. Lozano for laparoscopic sacrocolpopexy with synthetic mesh, possible anterior/posterior repair with perineorrhaphy, possible laparotomy, placement of synthetic midurethral sling, and cystourethroscopy.   R/B/A reviewed. Specific risks reviewed include:  infection, bleeding, need for blood transfusion, damage to surrounding structures, anesthesia risks, death, heart attack, stroke, mesh erosion/extrusion, pain, dyspareunia, urinary retention, voiding dysfunction, urinary incontinence, exacerbation of urinary urge incontinence, and need for further surgeries.  We reviewed potential for failure of POP defect repair and need for future surgery, with no way of predicting risk.  She understands success rate of ASC approaches 85%.  Success rate of midurethral sling for LEW was reviewed as 80-85%, and she understands that this will not necessarily impact other types of urinary incontinence.  Alternatives reviewed include: " pessary/PT for POP and pessary/periurethral injections/PT/medication for LEW.    2. T&S, urine HCG on DOS  3. VTE Prophylaxis:  lovenox 40 mg sq daily + SCDs  4. Patient instructed on Magnesium citrate and chlorahexadine/dial soap prep to perform day before & AM of surgery.   5. Proceed to OR for above-mentioned procedure.  Patient seen in uro-gynecology clinic today regarding participation in (IRB #[id number] PI: Ned).   The study was explained to the patient in detail, and the Informed Consent was reviewed and discussed with the patient.   All risks, benefits, procedures were discussed.   Adequate time was given for the patient to ask questions and receive answers.   Patient confirmed that all questions had been answered and verbalized desire to participate in study, understanding that participation is voluntary and can withdraw at any time.   Pt was informed that participation in this study would not preclude her from participating in any drug study/other research if offered.   Patient signed Informed Consent, and a signed copy was provided.  This Informed Consent process was conducted prior to initiation of any study procedures.      30 minutes were spent in face to face time with this patient  90 % of this time was spent in counseling and/or coordination of care  JEANNE Humphrey-BC [facility]  Division of Female Pelvic Medicine and Reconstructive Surgery  Department of Obstetrics & Gynecology

## 2019-03-07 ENCOUNTER — OFFICE VISIT (OUTPATIENT)
Dept: OBSTETRICS AND GYNECOLOGY | Facility: CLINIC | Age: 64
End: 2019-03-07
Payer: COMMERCIAL

## 2019-03-07 VITALS
HEIGHT: 63 IN | SYSTOLIC BLOOD PRESSURE: 128 MMHG | BODY MASS INDEX: 42.58 KG/M2 | DIASTOLIC BLOOD PRESSURE: 80 MMHG | WEIGHT: 240.31 LBS

## 2019-03-07 DIAGNOSIS — N81.4 PELVIC RELAXATION DUE TO UTEROVAGINAL PROLAPSE: ICD-10-CM

## 2019-03-07 DIAGNOSIS — N95.0 POSTMENOPAUSAL BLEEDING: ICD-10-CM

## 2019-03-07 DIAGNOSIS — N85.01 COMPLEX ENDOMETRIAL HYPERPLASIA: Primary | ICD-10-CM

## 2019-03-07 DIAGNOSIS — Z01.818 PREOP EXAMINATION: ICD-10-CM

## 2019-03-07 PROCEDURE — 99499 NO LOS: ICD-10-PCS | Mod: S$GLB,,, | Performed by: OBSTETRICS & GYNECOLOGY

## 2019-03-07 PROCEDURE — 99999 PR PBB SHADOW E&M-EST. PATIENT-LVL III: ICD-10-PCS | Mod: PBBFAC,,, | Performed by: OBSTETRICS & GYNECOLOGY

## 2019-03-07 PROCEDURE — 99499 UNLISTED E&M SERVICE: CPT | Mod: S$GLB,,, | Performed by: OBSTETRICS & GYNECOLOGY

## 2019-03-07 PROCEDURE — 99999 PR PBB SHADOW E&M-EST. PATIENT-LVL III: CPT | Mod: PBBFAC,,, | Performed by: OBSTETRICS & GYNECOLOGY

## 2019-03-07 NOTE — PROGRESS NOTES
CC: preop for [place] [facility]ia hysterectomy with BSO in conjunction with surgery with Urogynecology Dr. Saravia    63 y.o.  presents for pre-op H&P for preop for event she hysterectomy with bilateral salpingo-oophorectomy.  Patient also with significant prolapse and we are doing a joint surgery with Dr. Stockton in Urogynecology    Patient with postmenopausal bleeding and an endometrial biopsy showed complex hyperplasia without atypia.  Patient underwent AD and C which revealed just a polypoid benign fragments of inactive endometrium.  With dilated endometrial glands but no complex or atypical changes  After discussing pros and cons patient would like her prolapse fixed with Urogynecology and at the same time we will go ahead and perform a hysterectomy.    Uterus, endometrial biopsy:  -Complex endometrial hyperplasia without atypia.    S/p Hysteroscopy D&C:  FINAL PATHOLOGIC DIAGNOSIS  ENDOMETRIUM: BENIGN POLYPOID FRAGMENTS OF INACTIVE ENDOMETRIUM.  Note: Multiple fragments of an endometrial polyp consist mostly polyp-like fragments with dilated endometrial  glands surrounded by focally fibrotic stroma. There are no complex or atypical changes     Past Medical History:   Diagnosis Date    Abnormal Pap smear of cervix     LEEP , mild dysplasia normal paps since    Arthritis     ankles  born with club feet    GERD (gastroesophageal reflux disease)     HPV in female     Hyperlipidemia     Hypertension     Ulcerative colitis      Past Surgical History:   Procedure Laterality Date    CERVICAL BIOPSY  W/ LOOP ELECTRODE EXCISION      DILATION AND CURETTAGE, UTERUS  2018    [facility]    gastric sleeve  2015    HYSTEROSCOPY; TRUCLEAR RESECTION OF UTERINE POLYP N/A 2018    [facility]    LAPAROSCOPIC GASTRIC BANDING       Family History   Problem Relation Age of Onset    Diabetes Mother     Heart disease Father     Stroke  Sister     Diabetes Sister     Breast cancer Neg Hx      Review of patient's allergies indicates:   Allergen Reactions    Ciprofloxacin Swelling    Bactrim [sulfamethoxazole-trimethoprim] Rash       Current Outpatient Medications:     acetaminophen (TYLENOL) 650 MG TbSR, Take 650 mg by mouth as needed., Disp: , Rfl:     ascorbic acid, vitamin C, (VITAMIN C) 100 MG tablet, Take 100 mg by mouth once daily., Disp: , Rfl:     aspirin (ECOTRIN) 81 MG EC tablet, Take 81 mg by mouth once daily., Disp: , Rfl:     BETA-CAROTENE,A, W-C & E/MIN (OCUVITE ORAL), Take by mouth., Disp: , Rfl:     biotin 1 mg Cap, Take by mouth., Disp: , Rfl:     cetirizine (ZYRTEC) 10 MG tablet, Take 10 mg by mouth once daily., Disp: , Rfl:     diphenhydrAMINE (BENADRYL) 50 MG capsule, Take 50 mg by mouth nightly as needed for Itching., Disp: , Rfl:     ibuprofen (ADVIL,MOTRIN) 600 MG tablet, Take 1 tablet (600 mg total) by mouth every 6 (six) hours as needed for Pain., Disp: 30 tablet, Rfl: 1    krill oil 500 mg Cap, Take by mouth once daily., Disp: , Rfl:     LIALDA 1.2 gram TbEC, 2.4 g daily with breakfast. , Disp: , Rfl:     lisinopril 10 MG tablet, Take 10 mg by mouth once daily., Disp: , Rfl:     loratadine (CLARITIN) 10 mg tablet, Take 10 mg by mouth once daily., Disp: , Rfl:     omeprazole (PRILOSEC) 20 MG capsule, 20 mg once daily. , Disp: , Rfl:     pravastatin (PRAVACHOL) 20 MG tablet, 40 mg every evening. , Disp: , Rfl:     simethicone (GAS-X ORAL), Take by mouth once daily. , Disp: , Rfl:   Social History     Socioeconomic History    Marital status:      Spouse name: Not on file    Number of children: Not on file    Years of education: Not on file    Highest education level: Not on file   Social Needs    Financial resource strain: Not on file    Food insecurity - worry: Not on file    Food insecurity - inability: Not on file    Transportation needs - medical: Not on file    Transportation needs -  non-medical: Not on file   Occupational History    Occupation: retired   Tobacco Use    Smoking status: Never Smoker    Smokeless tobacco: Never Used   Substance and Sexual Activity    Alcohol use: No    Drug use: No    Sexual activity: Yes     Partners: Male     Birth control/protection: Post-menopausal   Other Topics Concern    Not on file   Social History Narrative    Not on file         Last pap 12/11/2018  Last pathology:  Last ultrasound:   Results for orders placed in visit on 10/10/18   US Pelvis Comp with Transvag NON-OB (xpd    Narrative EXAMINATION:  US PELVIS COMP WITH TRANSVAG NON-OB (XPD)    CLINICAL HISTORY:  Postmenopausal bleeding    TECHNIQUE:  Transabdominal sonography of the pelvis was performed, followed by transvaginal sonography to better evaluate the uterus and ovaries.    COMPARISON:  None.    FINDINGS:  Uterus:    Size: 10.1 x 4.9 x 5.8 cm    Masses: There is a 4.8 cm fibroid at the uterine fundus.    Endometrium: Thickened in this post menopausal patient, measuring 14 mm.    Neither ovary identified on today's exam.    Free Fluid:    None.      Impression Endometrium is significantly thickened at 14 mm in this postmenopausal patient.    Dominant uterine fibroid at the fundus.    Neither ovary is identified on today's exam.    This report was flagged in Epic as abnormal.      Electronically signed by: Sanjay Lancaster MD  Date:    10/10/2018  Time:    09:45     Last labs:  Lab Results   Component Value Date    WBC 7.08 03/06/2019    HGB 13.3 03/06/2019    HCT 42.0 03/06/2019    MCV 95 03/06/2019     03/06/2019       Vitals:    03/07/19 1304   BP: 128/80     General Appearance: Alert, appropriate appearance for age. No acute distress, Chest/Respiratory Exam: Normal chest wall and respirations. Clear to auscultation.   Cardiovascular Exam: regular rate and rhythm,   Gastrointestinal Exam: soft, non-tender; bowel sounds normal; no masses  Pelvic Exam Female: Exam deferred.    Psychiatric Exam: Alert and oriented, appropriate affect.    Assessment:  Complex hyperplasia with atypia with postmenopausal bleeding. Patient scheduled for dementia hysterectomy and bilateral salpingo-oophorectomy as well as prolapse surgery with Urogynecology    Problem List Items Addressed This Visit        Renal/    Postmenopausal bleeding    Complex endometrial hyperplasia - Primary      Other Visit Diagnoses     Preop examination        Pelvic relaxation due to uterovaginal prolapse              Plan:  Will proceed with DVH and BSO  Risks benefits and alternatives extensively discussed with patient including infection bleeding, injury to internal organs including ureter, bowels and bladder. Consent signed as well as consents for blood transfusion also signed.  All questions were answered.  I have discussed the risks, benefits, indications, and alternatives of the procedure in detail.  The patient verbalizes her understanding.  All questions answered.  Consents signed.  The patient agrees to proceed to proceed as planned.

## 2019-03-13 ENCOUNTER — HOSPITAL ENCOUNTER (OUTPATIENT)
Facility: OTHER | Age: 64
Discharge: HOME | End: 2019-03-14
Attending: OBSTETRICS & GYNECOLOGY | Admitting: OBSTETRICS & GYNECOLOGY
Payer: COMMERCIAL

## 2019-03-13 ENCOUNTER — ANESTHESIA (OUTPATIENT)
Dept: SURGERY | Facility: OTHER | Age: 64
End: 2019-03-13
Payer: COMMERCIAL

## 2019-03-13 DIAGNOSIS — N81.6 POSTERIOR VAGINAL WALL PROLAPSE: ICD-10-CM

## 2019-03-13 DIAGNOSIS — N81.2 UTEROVAGINAL PROLAPSE, INCOMPLETE: ICD-10-CM

## 2019-03-13 DIAGNOSIS — Z98.890 S/P ROBOT-ASSISTED SURGICAL PROCEDURE: ICD-10-CM

## 2019-03-13 DIAGNOSIS — N85.01 COMPLEX ENDOMETRIAL HYPERPLASIA: Primary | ICD-10-CM

## 2019-03-13 DIAGNOSIS — N81.3 UTEROVAGINAL PROLAPSE, COMPLETE: ICD-10-CM

## 2019-03-13 DIAGNOSIS — N81.10 PROLAPSE OF ANTERIOR VAGINAL WALL: ICD-10-CM

## 2019-03-13 DIAGNOSIS — N81.4 PELVIC RELAXATION DUE TO UTEROVAGINAL PROLAPSE: ICD-10-CM

## 2019-03-13 DIAGNOSIS — N95.0 POSTMENOPAUSAL BLEEDING: ICD-10-CM

## 2019-03-13 LAB — POCT GLUCOSE: 88 MG/DL (ref 70–110)

## 2019-03-13 PROCEDURE — 63600175 PHARM REV CODE 636 W HCPCS: Performed by: NURSE ANESTHETIST, CERTIFIED REGISTERED

## 2019-03-13 PROCEDURE — 88305 TISSUE SPECIMEN TO PATHOLOGY - SURGERY: ICD-10-PCS | Mod: 26,,, | Performed by: PATHOLOGY

## 2019-03-13 PROCEDURE — 88307 TISSUE EXAM BY PATHOLOGIST: CPT | Performed by: PATHOLOGY

## 2019-03-13 PROCEDURE — C2628 CATHETER, OCCLUSION: HCPCS | Performed by: OBSTETRICS & GYNECOLOGY

## 2019-03-13 PROCEDURE — 25000003 PHARM REV CODE 250: Performed by: OBSTETRICS & GYNECOLOGY

## 2019-03-13 PROCEDURE — 37000009 HC ANESTHESIA EA ADD 15 MINS: Performed by: OBSTETRICS & GYNECOLOGY

## 2019-03-13 PROCEDURE — 63600175 PHARM REV CODE 636 W HCPCS: Performed by: STUDENT IN AN ORGANIZED HEALTH CARE EDUCATION/TRAINING PROGRAM

## 2019-03-13 PROCEDURE — 57283 PR REVAGINAL PROLAPSE,UTEROSACRAL: ICD-10-PCS | Mod: ,,, | Performed by: OBSTETRICS & GYNECOLOGY

## 2019-03-13 PROCEDURE — 71000033 HC RECOVERY, INTIAL HOUR: Performed by: OBSTETRICS & GYNECOLOGY

## 2019-03-13 PROCEDURE — 25000003 PHARM REV CODE 250: Performed by: STUDENT IN AN ORGANIZED HEALTH CARE EDUCATION/TRAINING PROGRAM

## 2019-03-13 PROCEDURE — 63600175 PHARM REV CODE 636 W HCPCS: Performed by: ANESTHESIOLOGY

## 2019-03-13 PROCEDURE — S0030 INJECTION, METRONIDAZOLE: HCPCS | Performed by: NURSE ANESTHETIST, CERTIFIED REGISTERED

## 2019-03-13 PROCEDURE — 36000713 HC OR TIME LEV V EA ADD 15 MIN: Performed by: OBSTETRICS & GYNECOLOGY

## 2019-03-13 PROCEDURE — 88305 TISSUE EXAM BY PATHOLOGIST: CPT | Mod: 26,,, | Performed by: PATHOLOGY

## 2019-03-13 PROCEDURE — 94761 N-INVAS EAR/PLS OXIMETRY MLT: CPT

## 2019-03-13 PROCEDURE — 25000003 PHARM REV CODE 250: Performed by: ANESTHESIOLOGY

## 2019-03-13 PROCEDURE — 94799 UNLISTED PULMONARY SVC/PX: CPT

## 2019-03-13 PROCEDURE — 27201423 OPTIME MED/SURG SUP & DEVICES STERILE SUPPLY: Performed by: OBSTETRICS & GYNECOLOGY

## 2019-03-13 PROCEDURE — 57250 PR POST COLPORRHAPHY,RECTUM/VAGINA: ICD-10-PCS | Mod: 51,,, | Performed by: OBSTETRICS & GYNECOLOGY

## 2019-03-13 PROCEDURE — 63600175 PHARM REV CODE 636 W HCPCS: Performed by: OBSTETRICS & GYNECOLOGY

## 2019-03-13 PROCEDURE — 58571 PR LAPAROSCOPY W TOT HYSTERECTUTERUS <=250 GRAM  W TUBE/OVARY: ICD-10-PCS | Mod: ,,, | Performed by: OBSTETRICS & GYNECOLOGY

## 2019-03-13 PROCEDURE — C1763 CONN TISS, NON-HUMAN: HCPCS | Performed by: OBSTETRICS & GYNECOLOGY

## 2019-03-13 PROCEDURE — 57283 COLPOPEXY INTRAPERITONEAL: CPT | Mod: ,,, | Performed by: OBSTETRICS & GYNECOLOGY

## 2019-03-13 PROCEDURE — 37000008 HC ANESTHESIA 1ST 15 MINUTES: Performed by: OBSTETRICS & GYNECOLOGY

## 2019-03-13 PROCEDURE — 88307 TISSUE EXAM BY PATHOLOGIST: CPT | Mod: 26,,, | Performed by: PATHOLOGY

## 2019-03-13 PROCEDURE — S0030 INJECTION, METRONIDAZOLE: HCPCS | Performed by: OBSTETRICS & GYNECOLOGY

## 2019-03-13 PROCEDURE — 82962 GLUCOSE BLOOD TEST: CPT | Performed by: OBSTETRICS & GYNECOLOGY

## 2019-03-13 PROCEDURE — 88305 TISSUE EXAM BY PATHOLOGIST: CPT | Performed by: PATHOLOGY

## 2019-03-13 PROCEDURE — 36000712 HC OR TIME LEV V 1ST 15 MIN: Performed by: OBSTETRICS & GYNECOLOGY

## 2019-03-13 PROCEDURE — 27000221 HC OXYGEN, UP TO 24 HOURS

## 2019-03-13 PROCEDURE — 88307 TISSUE SPECIMEN TO PATHOLOGY - SURGERY: ICD-10-PCS | Mod: 26,,, | Performed by: PATHOLOGY

## 2019-03-13 PROCEDURE — 58571 TLH W/T/O 250 G OR LESS: CPT | Mod: ,,, | Performed by: OBSTETRICS & GYNECOLOGY

## 2019-03-13 PROCEDURE — S0020 INJECTION, BUPIVICAINE HYDRO: HCPCS | Performed by: OBSTETRICS & GYNECOLOGY

## 2019-03-13 PROCEDURE — 57250 REPAIR RECTUM & VAGINA: CPT | Mod: 51,,, | Performed by: OBSTETRICS & GYNECOLOGY

## 2019-03-13 PROCEDURE — 71000039 HC RECOVERY, EACH ADD'L HOUR: Performed by: OBSTETRICS & GYNECOLOGY

## 2019-03-13 PROCEDURE — 25000003 PHARM REV CODE 250: Performed by: NURSE ANESTHETIST, CERTIFIED REGISTERED

## 2019-03-13 DEVICE — MESH PELV UPSYLON BLUE Y SHAPE: Type: IMPLANTABLE DEVICE | Site: PELVIS | Status: FUNCTIONAL

## 2019-03-13 RX ORDER — LIDOCAINE HCL/PF 100 MG/5ML
SYRINGE (ML) INTRAVENOUS
Status: DISCONTINUED | OUTPATIENT
Start: 2019-03-13 | End: 2019-03-13

## 2019-03-13 RX ORDER — PRAVASTATIN SODIUM 20 MG/1
40 TABLET ORAL NIGHTLY
Status: DISCONTINUED | OUTPATIENT
Start: 2019-03-13 | End: 2019-03-14 | Stop reason: HOSPADM

## 2019-03-13 RX ORDER — HYDROMORPHONE HYDROCHLORIDE 2 MG/ML
0.4 INJECTION, SOLUTION INTRAMUSCULAR; INTRAVENOUS; SUBCUTANEOUS EVERY 5 MIN PRN
Status: DISCONTINUED | OUTPATIENT
Start: 2019-03-13 | End: 2019-03-13 | Stop reason: HOSPADM

## 2019-03-13 RX ORDER — MIDAZOLAM HYDROCHLORIDE 1 MG/ML
2 INJECTION INTRAMUSCULAR; INTRAVENOUS
Status: COMPLETED | OUTPATIENT
Start: 2019-03-13 | End: 2019-03-13

## 2019-03-13 RX ORDER — ONDANSETRON 2 MG/ML
INJECTION INTRAMUSCULAR; INTRAVENOUS
Status: DISCONTINUED | OUTPATIENT
Start: 2019-03-13 | End: 2019-03-13

## 2019-03-13 RX ORDER — OXYCODONE AND ACETAMINOPHEN 10; 325 MG/1; MG/1
1 TABLET ORAL EVERY 4 HOURS PRN
Status: DISCONTINUED | OUTPATIENT
Start: 2019-03-13 | End: 2019-03-14 | Stop reason: HOSPADM

## 2019-03-13 RX ORDER — FENTANYL CITRATE 50 UG/ML
INJECTION, SOLUTION INTRAMUSCULAR; INTRAVENOUS
Status: DISCONTINUED | OUTPATIENT
Start: 2019-03-13 | End: 2019-03-13

## 2019-03-13 RX ORDER — OXYCODONE AND ACETAMINOPHEN 5; 325 MG/1; MG/1
1 TABLET ORAL EVERY 4 HOURS PRN
Status: DISCONTINUED | OUTPATIENT
Start: 2019-03-13 | End: 2019-03-14 | Stop reason: HOSPADM

## 2019-03-13 RX ORDER — PANTOPRAZOLE SODIUM 40 MG/1
40 TABLET, DELAYED RELEASE ORAL DAILY
Status: DISCONTINUED | OUTPATIENT
Start: 2019-03-13 | End: 2019-03-14 | Stop reason: HOSPADM

## 2019-03-13 RX ORDER — BUPIVACAINE HYDROCHLORIDE 5 MG/ML
INJECTION, SOLUTION EPIDURAL; INTRACAUDAL
Status: DISCONTINUED | OUTPATIENT
Start: 2019-03-13 | End: 2019-03-13 | Stop reason: HOSPADM

## 2019-03-13 RX ORDER — PREGABALIN 75 MG/1
150 CAPSULE ORAL
Status: COMPLETED | OUTPATIENT
Start: 2019-03-13 | End: 2019-03-13

## 2019-03-13 RX ORDER — ONDANSETRON 8 MG/1
8 TABLET, ORALLY DISINTEGRATING ORAL EVERY 8 HOURS PRN
Status: DISCONTINUED | OUTPATIENT
Start: 2019-03-13 | End: 2019-03-14 | Stop reason: HOSPADM

## 2019-03-13 RX ORDER — HYDRALAZINE HYDROCHLORIDE 20 MG/ML
10 INJECTION INTRAMUSCULAR; INTRAVENOUS EVERY 6 HOURS PRN
Status: DISCONTINUED | OUTPATIENT
Start: 2019-03-13 | End: 2019-03-14 | Stop reason: HOSPADM

## 2019-03-13 RX ORDER — METRONIDAZOLE 500 MG/100ML
500 INJECTION, SOLUTION INTRAVENOUS
Status: DISCONTINUED | OUTPATIENT
Start: 2019-03-13 | End: 2019-03-14 | Stop reason: HOSPADM

## 2019-03-13 RX ORDER — VASOPRESSIN 20 [USP'U]/ML
INJECTION, SOLUTION INTRAMUSCULAR; SUBCUTANEOUS
Status: DISCONTINUED | OUTPATIENT
Start: 2019-03-13 | End: 2019-03-13 | Stop reason: HOSPADM

## 2019-03-13 RX ORDER — BISACODYL 10 MG
10 SUPPOSITORY, RECTAL RECTAL DAILY PRN
Status: DISCONTINUED | OUTPATIENT
Start: 2019-03-13 | End: 2019-03-14 | Stop reason: HOSPADM

## 2019-03-13 RX ORDER — HYDROMORPHONE HYDROCHLORIDE 1 MG/ML
1 INJECTION, SOLUTION INTRAMUSCULAR; INTRAVENOUS; SUBCUTANEOUS EVERY 4 HOURS PRN
Status: DISCONTINUED | OUTPATIENT
Start: 2019-03-13 | End: 2019-03-14 | Stop reason: HOSPADM

## 2019-03-13 RX ORDER — MUPIROCIN 20 MG/G
1 OINTMENT TOPICAL 2 TIMES DAILY
Status: DISCONTINUED | OUTPATIENT
Start: 2019-03-13 | End: 2019-03-14 | Stop reason: HOSPADM

## 2019-03-13 RX ORDER — SODIUM CHLORIDE, SODIUM LACTATE, POTASSIUM CHLORIDE, CALCIUM CHLORIDE 600; 310; 30; 20 MG/100ML; MG/100ML; MG/100ML; MG/100ML
INJECTION, SOLUTION INTRAVENOUS CONTINUOUS
Status: DISCONTINUED | OUTPATIENT
Start: 2019-03-13 | End: 2019-03-13

## 2019-03-13 RX ORDER — DIPHENHYDRAMINE HCL 25 MG
25 CAPSULE ORAL EVERY 4 HOURS PRN
Status: DISCONTINUED | OUTPATIENT
Start: 2019-03-13 | End: 2019-03-14 | Stop reason: HOSPADM

## 2019-03-13 RX ORDER — OXYCODONE HYDROCHLORIDE 5 MG/1
5 TABLET ORAL
Status: DISCONTINUED | OUTPATIENT
Start: 2019-03-13 | End: 2019-03-13 | Stop reason: HOSPADM

## 2019-03-13 RX ORDER — CEFAZOLIN SODIUM 1 G/3ML
2 INJECTION, POWDER, FOR SOLUTION INTRAMUSCULAR; INTRAVENOUS
Status: COMPLETED | OUTPATIENT
Start: 2019-03-13 | End: 2019-03-13

## 2019-03-13 RX ORDER — METRONIDAZOLE 500 MG/100ML
500 INJECTION, SOLUTION INTRAVENOUS
Status: DISCONTINUED | OUTPATIENT
Start: 2019-03-13 | End: 2019-03-13

## 2019-03-13 RX ORDER — CEFAZOLIN SODIUM 2 G/50ML
2 SOLUTION INTRAVENOUS
Status: DISCONTINUED | OUTPATIENT
Start: 2019-03-13 | End: 2019-03-13 | Stop reason: SDUPTHER

## 2019-03-13 RX ORDER — METRONIDAZOLE 500 MG/100ML
INJECTION, SOLUTION INTRAVENOUS
Status: DISCONTINUED | OUTPATIENT
Start: 2019-03-13 | End: 2019-03-13

## 2019-03-13 RX ORDER — FENTANYL CITRATE 50 UG/ML
25 INJECTION, SOLUTION INTRAMUSCULAR; INTRAVENOUS EVERY 5 MIN PRN
Status: DISCONTINUED | OUTPATIENT
Start: 2019-03-13 | End: 2019-03-13 | Stop reason: HOSPADM

## 2019-03-13 RX ORDER — PROPOFOL 10 MG/ML
VIAL (ML) INTRAVENOUS
Status: DISCONTINUED | OUTPATIENT
Start: 2019-03-13 | End: 2019-03-13

## 2019-03-13 RX ORDER — CEFOXITIN SODIUM 2 G/50ML
2 INJECTION, SOLUTION INTRAVENOUS
Status: DISCONTINUED | OUTPATIENT
Start: 2019-03-13 | End: 2019-03-14 | Stop reason: HOSPADM

## 2019-03-13 RX ORDER — KETOROLAC TROMETHAMINE 30 MG/ML
30 INJECTION, SOLUTION INTRAMUSCULAR; INTRAVENOUS EVERY 6 HOURS
Status: DISCONTINUED | OUTPATIENT
Start: 2019-03-13 | End: 2019-03-14 | Stop reason: HOSPADM

## 2019-03-13 RX ORDER — ENOXAPARIN SODIUM 100 MG/ML
40 INJECTION SUBCUTANEOUS EVERY 24 HOURS
Status: DISCONTINUED | OUTPATIENT
Start: 2019-03-13 | End: 2019-03-14 | Stop reason: HOSPADM

## 2019-03-13 RX ORDER — DEXTROSE MONOHYDRATE, SODIUM CHLORIDE, AND POTASSIUM CHLORIDE 50; 1.49; 4.5 G/1000ML; G/1000ML; G/1000ML
INJECTION, SOLUTION INTRAVENOUS CONTINUOUS
Status: DISCONTINUED | OUTPATIENT
Start: 2019-03-13 | End: 2019-03-14 | Stop reason: HOSPADM

## 2019-03-13 RX ORDER — SODIUM CHLORIDE 0.9 % (FLUSH) 0.9 %
3 SYRINGE (ML) INJECTION
Status: DISCONTINUED | OUTPATIENT
Start: 2019-03-13 | End: 2019-03-14 | Stop reason: HOSPADM

## 2019-03-13 RX ORDER — MEPERIDINE HYDROCHLORIDE 25 MG/ML
12.5 INJECTION INTRAMUSCULAR; INTRAVENOUS; SUBCUTANEOUS ONCE AS NEEDED
Status: DISCONTINUED | OUTPATIENT
Start: 2019-03-13 | End: 2019-03-13 | Stop reason: HOSPADM

## 2019-03-13 RX ORDER — DIPHENHYDRAMINE HYDROCHLORIDE 50 MG/ML
25 INJECTION INTRAMUSCULAR; INTRAVENOUS EVERY 4 HOURS PRN
Status: DISCONTINUED | OUTPATIENT
Start: 2019-03-13 | End: 2019-03-14 | Stop reason: HOSPADM

## 2019-03-13 RX ORDER — AMOXICILLIN 250 MG
1 CAPSULE ORAL 2 TIMES DAILY
Status: DISCONTINUED | OUTPATIENT
Start: 2019-03-13 | End: 2019-03-14 | Stop reason: HOSPADM

## 2019-03-13 RX ORDER — ONDANSETRON 2 MG/ML
4 INJECTION INTRAMUSCULAR; INTRAVENOUS DAILY PRN
Status: DISCONTINUED | OUTPATIENT
Start: 2019-03-13 | End: 2019-03-13 | Stop reason: HOSPADM

## 2019-03-13 RX ORDER — ACETAMINOPHEN 500 MG
1000 TABLET ORAL
Status: COMPLETED | OUTPATIENT
Start: 2019-03-13 | End: 2019-03-13

## 2019-03-13 RX ORDER — CETIRIZINE HYDROCHLORIDE 10 MG/1
10 TABLET ORAL DAILY
Status: DISCONTINUED | OUTPATIENT
Start: 2019-03-13 | End: 2019-03-14 | Stop reason: HOSPADM

## 2019-03-13 RX ORDER — LIDOCAINE HYDROCHLORIDE 10 MG/ML
0.5 INJECTION, SOLUTION EPIDURAL; INFILTRATION; INTRACAUDAL; PERINEURAL ONCE
Status: DISCONTINUED | OUTPATIENT
Start: 2019-03-13 | End: 2019-03-13 | Stop reason: HOSPADM

## 2019-03-13 RX ORDER — IBUPROFEN 600 MG/1
600 TABLET ORAL EVERY 6 HOURS
Status: DISCONTINUED | OUTPATIENT
Start: 2019-03-14 | End: 2019-03-14 | Stop reason: HOSPADM

## 2019-03-13 RX ORDER — ROCURONIUM BROMIDE 10 MG/ML
INJECTION, SOLUTION INTRAVENOUS
Status: DISCONTINUED | OUTPATIENT
Start: 2019-03-13 | End: 2019-03-13

## 2019-03-13 RX ADMIN — FENTANYL CITRATE 25 MCG: 50 INJECTION, SOLUTION INTRAMUSCULAR; INTRAVENOUS at 10:03

## 2019-03-13 RX ADMIN — INDIGO CARMINE 0.8 MG: 8 INJECTION, SOLUTION INTRAMUSCULAR; INTRAVENOUS at 12:03

## 2019-03-13 RX ADMIN — KETOROLAC TROMETHAMINE 30 MG: 30 INJECTION, SOLUTION INTRAMUSCULAR at 05:03

## 2019-03-13 RX ADMIN — ACETAMINOPHEN 1000 MG: 500 TABLET, FILM COATED ORAL at 06:03

## 2019-03-13 RX ADMIN — DEXTROSE, SODIUM CHLORIDE, AND POTASSIUM CHLORIDE: 5; .45; .15 INJECTION INTRAVENOUS at 11:03

## 2019-03-13 RX ADMIN — PREGABALIN 150 MG: 75 CAPSULE ORAL at 06:03

## 2019-03-13 RX ADMIN — CEFAZOLIN 2 G: 330 INJECTION, POWDER, FOR SOLUTION INTRAMUSCULAR; INTRAVENOUS at 08:03

## 2019-03-13 RX ADMIN — OXYCODONE HYDROCHLORIDE 5 MG: 5 TABLET ORAL at 01:03

## 2019-03-13 RX ADMIN — STANDARDIZED SENNA CONCENTRATE AND DOCUSATE SODIUM 1 TABLET: 8.6; 5 TABLET, FILM COATED ORAL at 09:03

## 2019-03-13 RX ADMIN — METRONIDAZOLE 500 MG: 500 SOLUTION INTRAVENOUS at 11:03

## 2019-03-13 RX ADMIN — FENTANYL CITRATE 125 MCG: 50 INJECTION, SOLUTION INTRAMUSCULAR; INTRAVENOUS at 08:03

## 2019-03-13 RX ADMIN — CEFOXITIN SODIUM 2 G: 2 INJECTION, SOLUTION INTRAVENOUS at 05:03

## 2019-03-13 RX ADMIN — HYDROMORPHONE HYDROCHLORIDE 0.4 MG: 2 INJECTION INTRAMUSCULAR; INTRAVENOUS; SUBCUTANEOUS at 02:03

## 2019-03-13 RX ADMIN — PROPOFOL 200 MG: 10 INJECTION, EMULSION INTRAVENOUS at 08:03

## 2019-03-13 RX ADMIN — HYDROMORPHONE HYDROCHLORIDE 0.4 MG: 2 INJECTION INTRAMUSCULAR; INTRAVENOUS; SUBCUTANEOUS at 01:03

## 2019-03-13 RX ADMIN — KETOROLAC TROMETHAMINE 30 MG: 30 INJECTION, SOLUTION INTRAMUSCULAR at 11:03

## 2019-03-13 RX ADMIN — CEFAZOLIN 1 G: 330 INJECTION, POWDER, FOR SOLUTION INTRAMUSCULAR; INTRAVENOUS at 11:03

## 2019-03-13 RX ADMIN — MIDAZOLAM HYDROCHLORIDE 2 MG: 1 INJECTION, SOLUTION INTRAMUSCULAR; INTRAVENOUS at 06:03

## 2019-03-13 RX ADMIN — MUPIROCIN 1 G: 20 OINTMENT TOPICAL at 09:03

## 2019-03-13 RX ADMIN — FENTANYL CITRATE 25 MCG: 50 INJECTION, SOLUTION INTRAMUSCULAR; INTRAVENOUS at 09:03

## 2019-03-13 RX ADMIN — OXYCODONE HYDROCHLORIDE AND ACETAMINOPHEN 1 TABLET: 10; 325 TABLET ORAL at 10:03

## 2019-03-13 RX ADMIN — ENOXAPARIN SODIUM 40 MG: 100 INJECTION SUBCUTANEOUS at 05:03

## 2019-03-13 RX ADMIN — ONDANSETRON 4 MG: 2 INJECTION INTRAMUSCULAR; INTRAVENOUS at 01:03

## 2019-03-13 RX ADMIN — METRONIDAZOLE 500 MG: 500 INJECTION, SOLUTION INTRAVENOUS at 09:03

## 2019-03-13 RX ADMIN — DEXTROSE, SODIUM CHLORIDE, AND POTASSIUM CHLORIDE: 5; .45; .15 INJECTION INTRAVENOUS at 05:03

## 2019-03-13 RX ADMIN — SODIUM CHLORIDE, SODIUM LACTATE, POTASSIUM CHLORIDE, AND CALCIUM CHLORIDE: 600; 310; 30; 20 INJECTION, SOLUTION INTRAVENOUS at 07:03

## 2019-03-13 RX ADMIN — PRAVASTATIN SODIUM 40 MG: 20 TABLET ORAL at 09:03

## 2019-03-13 RX ADMIN — CEFOXITIN SODIUM 2 G: 2 INJECTION, SOLUTION INTRAVENOUS at 11:03

## 2019-03-13 RX ADMIN — FENTANYL CITRATE 50 MCG: 50 INJECTION, SOLUTION INTRAMUSCULAR; INTRAVENOUS at 11:03

## 2019-03-13 RX ADMIN — ROCURONIUM BROMIDE 40 MG: 10 INJECTION, SOLUTION INTRAVENOUS at 08:03

## 2019-03-13 RX ADMIN — LIDOCAINE HYDROCHLORIDE 100 MG: 20 INJECTION, SOLUTION INTRAVENOUS at 08:03

## 2019-03-13 NOTE — H&P
CC: Endomtrial hyperplasia  For  Da Basia hysterectomy with BSO in conjunction with surgery with Urogynecology Dr. Saravia     63 y.o.  presents for Davinci hysterectomy with bilateral salpingo-oophorectomy.  Patient also with significant prolapse and we are doing a joint surgery with Dr. Stockton in Urogynecology    Patient with postmenopausal bleeding and an endometrial biopsy showed complex hyperplasia without atypia.  Patient underwent a D and C which revealed just a polypoid benign fragments of inactive endometrium.  With dilated endometrial glands but no complex or atypical changes  After discussing pros and cons patient would like her prolapse fixed with Urogynecology and at the same time we will go ahead and perform a hysterectomy.    Uterus, endometrial biopsy:  -Complex endometrial hyperplasia without atypia.    S/p Hysteroscopy D&C:  FINAL PATHOLOGIC DIAGNOSIS  ENDOMETRIUM: BENIGN POLYPOID FRAGMENTS OF INACTIVE ENDOMETRIUM.  Note: Multiple fragments of an endometrial polyp consist mostly polyp-like fragments with dilated endometrial  glands surrounded by focally fibrotic stroma. There are no complex or atypical changes     Past Medical History:   Diagnosis Date    Abnormal Pap smear of cervix     LEEP , mild dysplasia normal paps since    Arthritis     ankles  born with club feet    GERD (gastroesophageal reflux disease)     HPV in female     Hyperlipidemia     Hypertension     Ulcerative colitis      Past Surgical History:   Procedure Laterality Date    CERVICAL BIOPSY  W/ LOOP ELECTRODE EXCISION      DILATION AND CURETTAGE, UTERUS  2018    Per[facility] by [facility]ind [facility] at [facility]    gastric sleeve  2015    HYSTEROSCOPY; TRUCLEAR RESECTION OF UTERINE POLYP N/A 2018    Performed by [facility] at [facility] OR    LAPAROSCOPIC GASTRIC BANDING       Family History   Problem Relation Age of Onset    Diabetes Mother     Heart disease Father     Stroke  Sister     Diabetes Sister     Breast cancer Neg Hx      Review of patient's allergies indicates:   Allergen Reactions    Ciprofloxacin Swelling    Bactrim [sulfamethoxazole-trimethoprim] Rash       Current Outpatient Medications:     acetaminophen (TYLENOL) 650 MG TbSR, Take 650 mg by mouth as needed., Disp: , Rfl:     ascorbic acid, vitamin C, (VITAMIN C) 100 MG tablet, Take 100 mg by mouth once daily., Disp: , Rfl:     aspirin (ECOTRIN) 81 MG EC tablet, Take 81 mg by mouth once daily., Disp: , Rfl:     BETA-CAROTENE,A, W-C & E/MIN (OCUVITE ORAL), Take by mouth., Disp: , Rfl:     biotin 1 mg Cap, Take by mouth., Disp: , Rfl:     cetirizine (ZYRTEC) 10 MG tablet, Take 10 mg by mouth once daily., Disp: , Rfl:     diphenhydrAMINE (BENADRYL) 50 MG capsule, Take 50 mg by mouth nightly as needed for Itching., Disp: , Rfl:     ibuprofen (ADVIL,MOTRIN) 600 MG tablet, Take 1 tablet (600 mg total) by mouth every 6 (six) hours as needed for Pain., Disp: 30 tablet, Rfl: 1    krill oil 500 mg Cap, Take by mouth once daily., Disp: , Rfl:     LIALDA 1.2 gram TbEC, 2.4 g daily with breakfast. , Disp: , Rfl:     lisinopril 10 MG tablet, Take 10 mg by mouth once daily., Disp: , Rfl:     loratadine (CLARITIN) 10 mg tablet, Take 10 mg by mouth once daily., Disp: , Rfl:     omeprazole (PRILOSEC) 20 MG capsule, 20 mg once daily. , Disp: , Rfl:     pravastatin (PRAVACHOL) 20 MG tablet, 40 mg every evening. , Disp: , Rfl:     simethicone (GAS-X ORAL), Take by mouth once daily. , Disp: , Rfl:   Social History     Socioeconomic History    Marital status:      Spouse name: Not on file    Number of children: Not on file    Years of education: Not on file    Highest education level: Not on file   Social Needs    Financial resource strain: Not on file    Food insecurity - worry: Not on file    Food insecurity - inability: Not on file    Transportation needs - medical: Not on file    Transportation needs -  non-medical: Not on file   Occupational History    Occupation: retired   Tobacco Use    Smoking status: Never Smoker    Smokeless tobacco: Never Used   Substance and Sexual Activity    Alcohol use: No    Drug use: No    Sexual activity: Yes     Partners: Male     Birth control/protection: Post-menopausal   Other Topics Concern    Not on file   Social History Narrative    Not on file         Last pap 12/11/2018 normal    Last ultrasound:   Results for orders placed in visit on 10/10/18   US Pelvis Comp with Transvag NON-OB (xpd    Narrative EXAMINATION:  US PELVIS COMP WITH TRANSVAG NON-OB (XPD)    CLINICAL HISTORY:  Postmenopausal bleeding    TECHNIQUE:  Transabdominal sonography of the pelvis was performed, followed by transvaginal sonography to better evaluate the uterus and ovaries.    COMPARISON:  None.    FINDINGS:  Uterus:    Size: 10.1 x 4.9 x 5.8 cm    Masses: There is a 4.8 cm fibroid at the uterine fundus.    Endometrium: Thickened in this post menopausal patient, measuring 14 mm.    Neither ovary identified on today's exam.    Free Fluid:    None.      Impression Endometrium is significantly thickened at 14 mm in this postmenopausal patient.    Dominant uterine fibroid at the fundus.    Neither ovary is identified on today's exam.    This report was flagged in Epic as abnormal.      Electronically signed by: Sajnay Lancaster MD  Date:    10/10/2018  Time:    09:45     Last labs:  Lab Results   Component Value Date    WBC 7.08 03/06/2019    HGB 13.3 03/06/2019    HCT 42.0 03/06/2019    MCV 95 03/06/2019     03/06/2019       Vitals:    03/07/19 1304   BP: 128/80     General Appearance: Alert, appropriate appearance for age. No acute distress, Chest/Respiratory Exam: Normal chest wall and respirations. Clear to auscultation.   Cardiovascular Exam: regular rate and rhythm,   Gastrointestinal Exam: soft, non-tender; bowel sounds normal; no masses  Pelvic Exam Female: Exam deferred.   Psychiatric  Exam: Alert and oriented, appropriate affect.    Assessment:    Complex hyperplasia with atypia with postmenopausal bleeding. Patient scheduled for dementia hysterectomy and bilateral salpingo-oophorectomy as well as prolapse surgery with Urogynecology  Uterine fibroid    Problem List Items Addressed This Visit        Renal/    Postmenopausal bleeding    Complex endometrial hyperplasia - Primary      Other Visit Diagnoses     Preop examination        Pelvic relaxation due to uterovaginal prolapse              Plan:  Will proceed with DVH and BSO  Risks benefits and alternatives extensively discussed with patient including infection bleeding, injury to internal organs including ureter, bowels and bladder. Consent signed as well as consents for blood transfusion also signed.  All questions were answered.  I have discussed the risks, benefits, indications, and alternatives of the procedure in detail.  The patient verbalizes her understanding.  All questions answered.  Consents signed.  The patient agrees to proceed to proceed as planned.

## 2019-03-13 NOTE — TRANSFER OF CARE
"Anesthesia Transfer of Care Note    Patient: Jenny Caro    Procedure(s) Performed: Procedure(s) (LRB):  ROBOTIC HYSTERECTOMY (N/A)  ROBOTIC SACROCOLPOPEXY, ABDOMEN (N/A)  ROBOTIC SALPINGO-OOPHORECTOMY (Bilateral)  CYSTOSCOPY (N/A)  COLPORRHAPHY, POSTERIOR (N/A)    Patient location: PACU    Anesthesia Type: general    Transport from OR: Transported from OR on 2-3 L/min O2 by NC with adequate spontaneous ventilation    Post pain: adequate analgesia    Post assessment: no apparent anesthetic complications    Post vital signs: stable    Level of consciousness: awake, alert and oriented    Nausea/Vomiting: no nausea/vomiting    Complications: none          Last vitals:   Visit Vitals  BP (!) 142/76 (BP Location: Right arm, Patient Position: Lying)   Pulse (P) 84   Temp (P) 36.5 °C (97.7 °F) (Oral)   Resp (P) 18   Ht 5' 3" (1.6 m)   Wt 109 kg (240 lb 4.8 oz)   SpO2 (P) 97%   Breastfeeding? No   BMI 42.57 kg/m²     "

## 2019-03-13 NOTE — INTERVAL H&P NOTE
The patient has been examined and the H&P has been reviewed:    I concur with the findings and changes have been noted since the H&P was written: see below    Patient has very little leakage overall more consistent with urge incontinence as she describes it like a spasm. she has some incomplete bladder emptying. She did have a small amount of LEW, noted with reduction.  We reviewed with her the possible need for a MUS only if suprapubic pressure demonstrates significant leakage after suspension of the vagina. All questions answered she understands and will proceed with surgery.      Anesthesia/Surgery risks, benefits and alternative options discussed and understood by patient/family.          Active Hospital Problems    Diagnosis  POA    Uterovaginal prolapse, complete [N81.3]  Yes    Complex endometrial hyperplasia [N85.01]  Yes     Uterus, endometrial biopsy:  -Complex endometrial hyperplasia without atypia.        Resolved Hospital Problems   No resolved problems to display.

## 2019-03-13 NOTE — OP NOTE
OBGYN  Operative Note    SUMMARY     Date of Procedure: 3/13/2019     Procedure: Davinci Robotic Hysterectomy, Bilateral Salpingectomy, Cystoscopy   Procedure(s) (LRB):  ROBOTIC HYSTERECTOMY (N/A)  ROBOTIC SACROCOLPOPEXY, ABDOMEN (N/A)  COLPORRHAPHY, COMBINED ANTEROPOSTERIOR (N/A)  SLING, MIDURETHRAL (N/A)  ROBOTIC SALPINGO-OOPHORECTOMY (Bilateral)  CYSTOSCOPY (N/A)       Surgeon(s) and Role:  Panel 1:     * Arvind Russell MD - Primary  Panel 2:     * Rui Lozano DO - Primary    Assisting Surgeon: None    A qualified resident was not available.    Pre-Operative Diagnosis:   PMB  Complex hyperplasia without atypia  Uterovaginal prolapse, incomplete [N81.2]    Post-Operative Diagnosis: Post-Op Diagnosis Codes:  PMB  Complex hyperplasia without atypia  * Uterovaginal prolapse, incomplete [N81.2]    Anesthesia: General    Description of the Findings of the Procedure: Uterus 10weeks size with 4cm fundal fibroid, Normal bilateral tubes and ovaries, no evidence of endometriosis      Technical Procedures Used: The patient was taken to the operating room where general anesthesia was performed. She was then prepped and draped in the normal sterile fashion. A mendez was placed to gravity. A ROBERTO CARLOS 1 manipulator was placed in the normal fashion using the small cervical cup. And the uterine manipulator placed without difficulty.     Attention was then turned to the abdomen. Towel clamps were used to elevate the abdomen. A scalpel was used to make an 8 mm supraumbilical skin incision. The Veress needle was used to enter the abdomen. With a starting pressure of 3 mm Hg the abdomen was then insufflated to 15 mm Hg without difficulty.     A 8mm bladeless Davinci Trocar was then placed in the umbilical incision. The patient was placed in trendelenburg position. The above findings were noted. A 8 mm bladeless trocar was placed in the right lateral abdomen with visualization with the camera. Two other  8 mm bladeless trocar was  placed in the left lateral abdomen with visualization with the camera. A left upper quadrant Airseal 8 mm trocar was placed with visualization with the camera without difficulty. The Airseal was then activated. The pateint was then positioned in trendelenburg position.    The Robot was then docked in the usual fashion with the [place] bipolar in the left hand and the monopolar endoshears in the right hand. Attention was then turned to the left fallopian tube and ovary  was elevated and the IP was ligated and this was carried to the left round ligament which was ligated. Using bipolar as well as endoshears the left uterine artery was skeletonized. Attention was then turned to the right fallopian tube and  the ovary. The right round ligament and utero-ovarian ligament were ligated using the bipolar. The right uterine artery was then skeletonized. A bladder flap was then created using the monopolar endoshears.     An anterior colpotomy was made using the monopolar endoshears around the blue ROBERTO CARLOS cup. Attention was then turned posteriorly and an posterior colpotomy was made at the level of the uterosacral ligaments with the endoshears. This was carried around the blue ROBERTO CARLOS cup. The left uterine artery was then ligated using the [place] bipolar with excellent hemostasis.  The right uterine artery was ligated using the [place] bipolar with excellent hemostasis. The uterus was now free and removed through the vagina without difficulty.     The abdomen was then irrigated and cleared of all clots and debris. The vaginal cuff was noted to have excellent hemostasis.   The surgery was then turned over to Dr Charles to close the cuff and complete her portion of the surgery    . The patient tolerated the procedure well. Sponge, lap and needle counts were correct x 2.    Complications: No    Estimated Blood Loss (EBL): 50cc   * No values recorded between 3/13/2019  8:29 AM and 3/13/2019 12:48 PM *    Specimens:   Specimen  (12h ago, onward)    Start     Ordered    03/13/19 0919  Specimen to Pathology - Surgery  Once     Comments:  1. Uterus, cervix, bilateral tubes and ovaries     Start Status   03/13/19 0919 Collected (03/13/19 0937)       03/13/19 0936                  Condition: Good    Disposition: PACU - hemodynamically stable.

## 2019-03-13 NOTE — OP NOTE
THIS IS A CONFIDENTIAL AND PRIVILEGED COMMUNICATION PLEASE      DISPOSE OF PAPER COPIES APPROPRIATELY     [facility] [place], [place]        OPERATIVE REPORT        PATIENT NAME: Jenny Caro    MEDICAL RECORD NUMBER: [id number]    PROCEDURE DATE: 03/13/2019    PROCEDURE:    1. Robotic assisted Sacrocolpopexy with permanent mesh (OPENLANE Ref: [id number] Lot# [id number])   2. Cystourethroscopy  3. Posterior colporrhaphy      ATTENDING SURGEON:  Rui Lozano DO     ASSISTANT:  PGY- 3    PREOPERATIVE DIAGNOSIS:   1. Symptomatic Stage 2 apical prolapse  2. Symptomatic Stage 3  Anterior and stage 2 posterior vaginal wall prolapse  3. Stage 2 posterior vaginal wall prolapse   4. Urinary urgency     POSTOPERATIVE DIAGNOSIS:   1. Symptomatic Stage 2 apical prolapse  2. Symptomatic Stage 3 anterior and stage 2 posterior vaginal wall prolapse  3. 4. Urinary urgency   4.Diverticulosis     ANESTHESIA: GET    WOUND CLASSIFICATION: Clean-Contaminated    ANTIBIOTIC PROPHYLAXIS: Kefzol    FINDINGS: Redundant bowel with poor movement of the sigmod colon, epiploica with minimal bleeding, Cystourethroscopy: normal bladder mucosa, normal urethra, efflux of urine from bilateral ureteral orifices.     SPECIMENS SENT: Vaginal epithelium     COMPLICATIONS: None    DRAINS: Paz 700 mL    ESTIMATED BLOOD LOSS:  50 mL    IV FLUIDS: 1300 mL    POSTOP CONDITION: Stable    PROCEDURE:    The patient was identified in the preoperative area where informed consent was confirmed, and she was taken to the operating room where an adequate level of general anesthesia was obtained.  The patient was positioned, robot docked, and the hysterectomy with bilateral salpingo-oophrectomy was performed by Dr. Russell. In entered the room with the uterus, tubes and ovaries removed and the cuff open. We undocked the robot and positioned it to enable evaluation of the sacral promontory. The bowel was very redundant and we used the  4th robotic arm to hold up the epiploica effectively moving the sigmoid colon laterally and out of our way. I confirmed that the mendez was draining appropriately and that the SCD were on and working. I also confirmed IV antibiotics order were administered.  Care was taken to avoid joint hyperflexion or hyperextension, and all extremity surfaces were carefully padded so as to minimize risk of neurologic injury.  The patient's abdomen, perineum, and vagina were sterilely prepped and draped. A mendez catheter was placed in the bladder for drainage.   previously performed exam is consistent with our findings of stage 2 apical prolapse and stage 3 anterior vaginal wall prolapse. With bowel out the surgical field the vaginal cuff was re-approximated with 0 V-loc suture in a running fashion.     An incision was made in the peritoneum posterior aspect of the vagina in the midline and the peritoneum was dissected laterally creating peritoneal flaps to cover the sacrocolpopexy mesh.  Attention was then turned to the sacral promontory, which was identified by visualization and palpation. This was very fatty and the great vessels were more medial. Therefore,  greater than 40  minutes of slow dissection to the promontory until we were able to reach the anterior longitudinal ligament.   Again, the course of the right ureter as well as the location of the sigmoid colon was identified. The peritoneum overlying the sacral promontory was then elevated and incised using the  monopolor scissors. Gentle blunt dissection was then undertaken to reveal presacral fascia again with hemostasis noted. A peritoneal tunnel was then made from the sacral promontory along the right pelvic sidewall and right uterosacral ligament, up to the incision that had been previously made on the posterior aspect of the vagina. The mesh was then trimmed and introduced into the abdomen.     The anterior and posterior arms of the mesh were attached to the anterior  and posterior aspects of the vagina. The mesh was further secured to the vagina with 0 Vicyrl sutures, 6 placed anteriorly and 6 posteriorly. The long arm of the mesh was then brought through the peritoneal tunnel that had been created, and the mesh was tensioned appropriately and attached to the sacral promontory with 2 sutures of 0 Ethibond. Excess mesh was again trimmed. The peritoneum overlying the sacral promontory was reapproximated with 2.0 Vicryl in a pursestring fashion surgiflow was placed in the tunnel with good hemostasis. The anterior and posterior leafs of the peritoneum in the pelvis were reapproximated using 0 Vicryl in a pursestring fashion as well, essentially covering the entire mesh.     Cystoscopy was then undertaken, which revealed bilateral ureteral jets of blue urine; bladder mucosa intact, no injuries, no stones or diverticulum. The Paz catheter was reinserted. Again, close inspection of the pelvis revealed excellent hemostasis. All instruments were then removed from the abdomen and the pelvis. The fascia of the umbilical incision was reapproximated with 0 Vicryl in a running fashion. The remainder of the skin incisions were re-approximated with Steri-strips, 2x2s, and Tegaderm.    Attention was then turned to the vagina, good support of the anterior vaginal wall was noted, therefore there was no need for an anterior colporrhaphy. With the bladder full we placed suprapubic pressure to assessed for urinary leakage. No leakage was noted therefore we did not proceed with the retropubic sling.     Finally, we performed the posterior colporrhaphy and perineorrhaphy.  Allis clamps were placed on the left and right hymenal remnants and at the proximal limit of the rectovaginal septal defect along the vaginal mucosa. The perineal skin to be resected for the perineorrhaphy was grasped with several Allis clamps.  The perineum and vaginal mucosa were injected with 1% lidocaine with epinephrine, which  was distributed beneath the perineal skin and vaginal mucosa both in the midline and in the direction of the fornices. The perineal skin within the outlined area was dissected off the underlying perineal body with Metzenbaum scissors.  The posterior vaginal mucosa was then incised in the midline with Metzenbaum scissors.  The left and right margins of the vaginal mucosa were grasped with Allis clamps and the vaginal mucosa was dissected sharply off of the underlying rectovaginal fibromuscular connective tissue.  Individual areas of bleeding were made hemostatic with the electrocautery.  The rectovaginal fibromuscular tissue was exposed bilaterally to the margins of the levator ani muscles and plicated in the midline with a series of vertical mattress stitches of 0 Vicryl, bringing together the perineal body in the midline between the hymenal remnants.  A series of vertical mattress stitches were used to plicate the perineal body beneath the perineal skin thereby plicating the superficial and deep transverse perineal muscles and bulbocavernosus muscles.  Care was taken to make sure that vaginal caliber/ introitus allowed 2 - 3 fingerbreadths to be placed without difficulty. There was no significant posterior vaginal wall contracture/ ridging at the completion of the plication. The bed of the repair was copiously irrigated. The vaginal mucosal margins of the vertical incision were trimmed with Metzenbaum scissors on each side and reapproximated with a running, locking stitch of 2-0 Vicryl.  Excellent hemostasis was observed along the suture line. Vaginal exam confirmed good reinforcement of the rectocele site without ridging or significant contracture of the vault.  Rectovaginal examination was performed with findings as described above. The vagina was packed with guaze.     The patient tolerated the procedure well. Needle, sponge lap, and instrument counts were correct x2. The patient was transferred to recovery in  stable condition.    Dr. Rui Lozano was present and participated in the entire procedure.    DICTATED BY Rui Lozano, DO    _________________________

## 2019-03-13 NOTE — H&P (VIEW-ONLY)
CC: Endomtrial hyperplasia  For  Da Basia hysterectomy with BSO in conjunction with surgery with Urogynecology Dr. Saravia     63 y.o.  presents for Davinci hysterectomy with bilateral salpingo-oophorectomy.  Patient also with significant prolapse and we are doing a joint surgery with Dr. Stockton in Urogynecology    Patient with postmenopausal bleeding and an endometrial biopsy showed complex hyperplasia without atypia.  Patient underwent a D and C which revealed just a polypoid benign fragments of inactive endometrium.  With dilated endometrial glands but no complex or atypical changes  After discussing pros and cons patient would like her prolapse fixed with Urogynecology and at the same time we will go ahead and perform a hysterectomy.    Uterus, endometrial biopsy:  -Complex endometrial hyperplasia without atypia.    S/p Hysteroscopy D&C:  FINAL PATHOLOGIC DIAGNOSIS  ENDOMETRIUM: BENIGN POLYPOID FRAGMENTS OF INACTIVE ENDOMETRIUM.  Note: Multiple fragments of an endometrial polyp consist mostly polyp-like fragments with dilated endometrial  glands surrounded by focally fibrotic stroma. There are no complex or atypical changes     Past Medical History:   Diagnosis Date    Abnormal Pap smear of cervix     LEEP , mild dysplasia normal paps since    Arthritis     ankles  born with club feet    GERD (gastroesophageal reflux disease)     HPV in female     Hyperlipidemia     Hypertension     Ulcerative colitis      Past Surgical History:   Procedure Laterality Date    CERVICAL BIOPSY  W/ LOOP ELECTRODE EXCISION      DILATION AND CURETTAGE, UTERUS  2018    [facility] at [facility]    gastric sleeve  2015    HYSTEROSCOPY; TRUCLEAR RESECTION OF UTERINE POLYP N/A 2018    Performed by [facility] at [facility] OR    LAPAROSCOPIC GASTRIC BANDING       Family History   Problem Relation Age of Onset    Diabetes Mother     Heart disease Father     Stroke  Sister     Diabetes Sister     Breast cancer Neg Hx      Review of patient's allergies indicates:   Allergen Reactions    Ciprofloxacin Swelling    Bactrim [sulfamethoxazole-trimethoprim] Rash       Current Outpatient Medications:     acetaminophen (TYLENOL) 650 MG TbSR, Take 650 mg by mouth as needed., Disp: , Rfl:     ascorbic acid, vitamin C, (VITAMIN C) 100 MG tablet, Take 100 mg by mouth once daily., Disp: , Rfl:     aspirin (ECOTRIN) 81 MG EC tablet, Take 81 mg by mouth once daily., Disp: , Rfl:     BETA-CAROTENE,A, W-C & E/MIN (OCUVITE ORAL), Take by mouth., Disp: , Rfl:     biotin 1 mg Cap, Take by mouth., Disp: , Rfl:     cetirizine (ZYRTEC) 10 MG tablet, Take 10 mg by mouth once daily., Disp: , Rfl:     diphenhydrAMINE (BENADRYL) 50 MG capsule, Take 50 mg by mouth nightly as needed for Itching., Disp: , Rfl:     ibuprofen (ADVIL,MOTRIN) 600 MG tablet, Take 1 tablet (600 mg total) by mouth every 6 (six) hours as needed for Pain., Disp: 30 tablet, Rfl: 1    krill oil 500 mg Cap, Take by mouth once daily., Disp: , Rfl:     LIALDA 1.2 gram TbEC, 2.4 g daily with breakfast. , Disp: , Rfl:     lisinopril 10 MG tablet, Take 10 mg by mouth once daily., Disp: , Rfl:     loratadine (CLARITIN) 10 mg tablet, Take 10 mg by mouth once daily., Disp: , Rfl:     omeprazole (PRILOSEC) 20 MG capsule, 20 mg once daily. , Disp: , Rfl:     pravastatin (PRAVACHOL) 20 MG tablet, 40 mg every evening. , Disp: , Rfl:     simethicone (GAS-X ORAL), Take by mouth once daily. , Disp: , Rfl:   Social History     Socioeconomic History    Marital status:      Spouse name: Not on file    Number of children: Not on file    Years of education: Not on file    Highest education level: Not on file   Social Needs    Financial resource strain: Not on file    Food insecurity - worry: Not on file    Food insecurity - inability: Not on file    Transportation needs - medical: Not on file    Transportation needs -  non-medical: Not on file   Occupational History    Occupation: retired   Tobacco Use    Smoking status: Never Smoker    Smokeless tobacco: Never Used   Substance and Sexual Activity    Alcohol use: No    Drug use: No    Sexual activity: Yes     Partners: Male     Birth control/protection: Post-menopausal   Other Topics Concern    Not on file   Social History Narrative    Not on file         Last pap 12/11/2018 normal    Last ultrasound:   Results for orders placed in visit on 10/10/18   US Pelvis Comp with Transvag NON-OB (xpd    Narrative EXAMINATION:  US PELVIS COMP WITH TRANSVAG NON-OB (XPD)    CLINICAL HISTORY:  Postmenopausal bleeding    TECHNIQUE:  Transabdominal sonography of the pelvis was performed, followed by transvaginal sonography to better evaluate the uterus and ovaries.    COMPARISON:  None.    FINDINGS:  Uterus:    Size: 10.1 x 4.9 x 5.8 cm    Masses: There is a 4.8 cm fibroid at the uterine fundus.    Endometrium: Thickened in this post menopausal patient, measuring 14 mm.    Neither ovary identified on today's exam.    Free Fluid:    None.      Impression Endometrium is significantly thickened at 14 mm in this postmenopausal patient.    Dominant uterine fibroid at the fundus.    Neither ovary is identified on today's exam.    This report was flagged in Epic as abnormal.      Electronically signed by: Sanjay Lancaster MD  Date:    10/10/2018  Time:    09:45     Last labs:  Lab Results   Component Value Date    WBC 7.08 03/06/2019    HGB 13.3 03/06/2019    HCT 42.0 03/06/2019    MCV 95 03/06/2019     03/06/2019       Vitals:    03/07/19 1304   BP: 128/80     General Appearance: Alert, appropriate appearance for age. No acute distress, Chest/Respiratory Exam: Normal chest wall and respirations. Clear to auscultation.   Cardiovascular Exam: regular rate and rhythm,   Gastrointestinal Exam: soft, non-tender; bowel sounds normal; no masses  Pelvic Exam Female: Exam deferred.   Psychiatric  Exam: Alert and oriented, appropriate affect.    Assessment:    Complex hyperplasia with atypia with postmenopausal bleeding. Patient scheduled for dementia hysterectomy and bilateral salpingo-oophorectomy as well as prolapse surgery with Urogynecology  Uterine fibroid    Problem List Items Addressed This Visit        Renal/    Postmenopausal bleeding    Complex endometrial hyperplasia - Primary      Other Visit Diagnoses     Preop examination        Pelvic relaxation due to uterovaginal prolapse              Plan:  Will proceed with DVH and BSO  Risks benefits and alternatives extensively discussed with patient including infection bleeding, injury to internal organs including ureter, bowels and bladder. Consent signed as well as consents for blood transfusion also signed.  All questions were answered.  I have discussed the risks, benefits, indications, and alternatives of the procedure in detail.  The patient verbalizes her understanding.  All questions answered.  Consents signed.  The patient agrees to proceed to proceed as planned.

## 2019-03-13 NOTE — ANESTHESIA POSTPROCEDURE EVALUATION
"Anesthesia Post Evaluation    Patient: Jenny Caro    Procedure(s) Performed: Procedure(s) (LRB):  ROBOTIC HYSTERECTOMY (N/A)  ROBOTIC SACROCOLPOPEXY, ABDOMEN (N/A)  ROBOTIC SALPINGO-OOPHORECTOMY (Bilateral)  CYSTOSCOPY (N/A)  COLPORRHAPHY, POSTERIOR (N/A)    Final Anesthesia Type: general  Patient location during evaluation: PACU  Patient participation: Yes- Able to Participate  Level of consciousness: awake and alert  Post-procedure vital signs: reviewed and stable  Pain management: adequate  Airway patency: patent  PONV status at discharge: No PONV  Anesthetic complications: no      Cardiovascular status: blood pressure returned to baseline and stable  Respiratory status: unassisted, spontaneous ventilation and room air  Hydration status: euvolemic  Follow-up not needed.        Visit Vitals  BP (!) 124/58 (BP Location: Right arm, Patient Position: Lying)   Pulse 81   Temp 36.8 °C (98.3 °F) (Oral)   Resp 16   Ht 5' 3" (1.6 m)   Wt 109 kg (240 lb 4.8 oz)   SpO2 96%   Breastfeeding? No   BMI 42.57 kg/m²       Pain/Megan Score: Pain Rating Prior to Med Admin: 7 (3/13/2019  2:21 PM)  Pain Rating Post Med Admin: 0 (3/13/2019  2:41 PM)  Megan Score: 8 (3/13/2019  2:20 PM)        "

## 2019-03-13 NOTE — INTERVAL H&P NOTE
The patient has been examined and the H&P has been reviewed:    I concur with the findings and no changes have occurred since H&P was written.    Anesthesia/Surgery risks, benefits and alternative options discussed and understood by patient/family.          Active Hospital Problems    Diagnosis  POA    Uterovaginal prolapse, complete [N81.3]  Yes    Complex endometrial hyperplasia [N85.01]  Yes     Uterus, endometrial biopsy:  -Complex endometrial hyperplasia without atypia.        Resolved Hospital Problems   No resolved problems to display.

## 2019-03-13 NOTE — BRIEF OP NOTE
[facility]  Brief Operative Note    SUMMARY     Surgery Date: 3/13/2019     Surgeon(s) and Role:  Panel 1:     * Arvind Russell MD - Primary  Panel 2:     * Rui Lozano DO - Primary    Assisting at bedside:    JAYDE Holland MD    Pre-op Diagnosis:    Morbid obesity  HTN  HLD  Endometrial hyperplasia  Uterovaginal prolapse, incomplete [N81.2]    Post-op Diagnosis:  Post-Op Diagnosis Codes:  Same as above  S/p RALH/BSO/SCC/Posterior epair    Procedure(s) (LRB):  ROBOTIC HYSTERECTOMY (N/A)  ROBOTIC SACROCOLPOPEXY, ABDOMEN (N/A)  ROBOTIC SALPINGO-OOPHORECTOMY (Bilateral)  CYSTOSCOPY (N/A)  COLPORRHAPHY, POSTERIOR (N/A)    Anesthesia: General    Description of the findings of the procedure:   Sacrocolpoplexy and posterior repair portion:    1. Bowel throughout pelvis, difficult to retract throughout procedure  2. Standard changes S/p RALH/BSO  3. Bilateral effluxing UOs  4. Sacral promonotory obscured by fat and bowel, but accessible  5. Uncomplicated SCC with permanent mesh  6. Posterior repair  7. Bilateral effluxing UOs with blue dye on cysto    Estimated Blood Loss: * No values recorded between 3/13/2019  8:29 AM and 3/13/2019  1:31 PM *         Specimens:   Specimen (12h ago, onward)    Start     Ordered    03/13/19 1249  Specimen to Pathology - Surgery  Once     Comments:  1. Posterior Vagina     Start Status   03/13/19 1249 Collected (03/13/19 1312)       03/13/19 1312    03/13/19 0919  Specimen to Pathology - Surgery  Once     Comments:  1. Uterus, cervix, bilateral tubes and ovaries     Start Status   03/13/19 0919 In process       03/13/19 0936

## 2019-03-13 NOTE — NURSING
Pt arrived to floor via stretcher with MG Weber and transferred self to bed. VS taken and stable on RA and afebrile. IVF started, SCDs applied, oriented to room, call light placed within reach, bed low and locked, and daugther at bedside. No complaints of pain. Paz noted draining clear blue urine to gravity. Incisions noted to abdomen;  Lap sites x5, CDI. No acute distress noted at this time. Will continue to monitor.

## 2019-03-14 VITALS
HEIGHT: 63 IN | WEIGHT: 240.31 LBS | RESPIRATION RATE: 18 BRPM | DIASTOLIC BLOOD PRESSURE: 62 MMHG | TEMPERATURE: 98 F | OXYGEN SATURATION: 94 % | HEART RATE: 74 BPM | SYSTOLIC BLOOD PRESSURE: 134 MMHG | BODY MASS INDEX: 42.58 KG/M2

## 2019-03-14 LAB
BASOPHILS # BLD AUTO: 0 K/UL
BASOPHILS NFR BLD: 0 %
DIFFERENTIAL METHOD: ABNORMAL
EOSINOPHIL # BLD AUTO: 0.2 K/UL
EOSINOPHIL NFR BLD: 2.2 %
ERYTHROCYTE [DISTWIDTH] IN BLOOD BY AUTOMATED COUNT: 14.7 %
HCT VFR BLD AUTO: 35.5 %
HGB BLD-MCNC: 11.1 G/DL
LYMPHOCYTES # BLD AUTO: 0.9 K/UL
LYMPHOCYTES NFR BLD: 9.7 %
MCH RBC QN AUTO: 30.2 PG
MCHC RBC AUTO-ENTMCNC: 31.3 G/DL
MCV RBC AUTO: 97 FL
MONOCYTES # BLD AUTO: 0.6 K/UL
MONOCYTES NFR BLD: 6.4 %
NEUTROPHILS # BLD AUTO: 7.8 K/UL
NEUTROPHILS NFR BLD: 81.6 %
PLATELET # BLD AUTO: 250 K/UL
PMV BLD AUTO: 9.8 FL
RBC # BLD AUTO: 3.68 M/UL
WBC # BLD AUTO: 9.51 K/UL

## 2019-03-14 PROCEDURE — S0030 INJECTION, METRONIDAZOLE: HCPCS | Performed by: OBSTETRICS & GYNECOLOGY

## 2019-03-14 PROCEDURE — 36415 COLL VENOUS BLD VENIPUNCTURE: CPT

## 2019-03-14 PROCEDURE — 25000003 PHARM REV CODE 250: Performed by: STUDENT IN AN ORGANIZED HEALTH CARE EDUCATION/TRAINING PROGRAM

## 2019-03-14 PROCEDURE — 63600175 PHARM REV CODE 636 W HCPCS: Performed by: STUDENT IN AN ORGANIZED HEALTH CARE EDUCATION/TRAINING PROGRAM

## 2019-03-14 PROCEDURE — 85025 COMPLETE CBC W/AUTO DIFF WBC: CPT

## 2019-03-14 PROCEDURE — 25000003 PHARM REV CODE 250: Performed by: OBSTETRICS & GYNECOLOGY

## 2019-03-14 RX ORDER — OXYCODONE AND ACETAMINOPHEN 5; 325 MG/1; MG/1
1 TABLET ORAL EVERY 4 HOURS PRN
Qty: 20 TABLET | Refills: 0 | Status: SHIPPED | OUTPATIENT
Start: 2019-03-14 | End: 2019-03-27

## 2019-03-14 RX ORDER — IBUPROFEN 600 MG/1
600 TABLET ORAL 3 TIMES DAILY
Qty: 30 TABLET | Refills: 1 | Status: SHIPPED | OUTPATIENT
Start: 2019-03-14 | End: 2019-03-27 | Stop reason: SDUPTHER

## 2019-03-14 RX ADMIN — KETOROLAC TROMETHAMINE 30 MG: 30 INJECTION, SOLUTION INTRAMUSCULAR at 05:03

## 2019-03-14 RX ADMIN — CEFOXITIN SODIUM 2 G: 2 INJECTION, SOLUTION INTRAVENOUS at 04:03

## 2019-03-14 RX ADMIN — METRONIDAZOLE 500 MG: 500 INJECTION, SOLUTION INTRAVENOUS at 05:03

## 2019-03-14 RX ADMIN — OXYCODONE HYDROCHLORIDE AND ACETAMINOPHEN 1 TABLET: 10; 325 TABLET ORAL at 03:03

## 2019-03-14 NOTE — PLAN OF CARE
Problem: Adult Inpatient Plan of Care  Goal: Plan of Care Review  Outcome: Ongoing (interventions implemented as appropriate)  Pt on 2L NC. Sats 99%. No distress noted. IS done with good effort. Will continue to monitor.

## 2019-03-14 NOTE — DISCHARGE SUMMARY
[facility]  Obstetrics & Gynecology  Discharge Summary    Patient Name: Jenny Caro  MRN: [id number]  Admission Date: 3/13/2019  Hospital Length of Stay: 0 days  Discharge Date and Time: 03/22/2019  Attending Physician: No att. providers found   Discharging Provider: Boby Santamaria MD  Primary Care Provider: Eliceo Beatty MD    HPI: Patient presented for scheduled RALH/BSO with sacrocolpoplexy and posterior repair for complex hyperlasia and POP    Hospital Course:   03/22/2019 - POD 1 - meeting all post op milestones. Passive void trial passed. Stable for discharge.     Procedure(s) (LRB):  ROBOTIC HYSTERECTOMY (N/A)  ROBOTIC SACROCOLPOPEXY, ABDOMEN (N/A)  ROBOTIC SALPINGO-OOPHORECTOMY (Bilateral)  CYSTOSCOPY (N/A)  COLPORRHAPHY, POSTERIOR (N/A)     Consults:     Significant Diagnostic Studies: Labs: All labs within the past 24 hours have been reviewed    Pending Diagnostic Studies:     None        Final Active Diagnoses:    Diagnosis Date Noted POA    Uterovaginal prolapse, complete [N81.3] 03/13/2019 Yes    S/P robot-assisted lap hyst/BSO/sacrocolpoplexy and posterior repair [Z98.890] 03/13/2019 Not Applicable      Problems Resolved During this Admission:    Diagnosis Date Noted Date Resolved POA    Complex endometrial hyperplasia [N85.01] 11/16/2018 03/13/2019 Yes        Discharged Condition: good    Disposition: Home or Self Care    Follow Up:  Follow-up Information     Rui Lozano DO.    Specialties:  Gynecology, Urology  Why:  As Previously Scheduled  Contact information:  [address]  [facility] [id number] [phone]                 Patient Instructions:      Diet Adult Regular     Other restrictions (specify):   Order Comments: No driving until pain free and off of pain meds  Nothing in vagina until cleared by gynecologist (no tampons, douching, sex)  No baths for two weeks, only showers  No lifting anything more than 10 pounds for two weeks     Notify your  health care provider if you experience any of the following:  temperature >100.4     Notify your health care provider if you experience any of the following:  persistent nausea and vomiting or diarrhea     Notify your health care provider if you experience any of the following:  severe uncontrolled pain     Notify your health care provider if you experience any of the following:  redness, tenderness, or signs of infection (pain, swelling, redness, odor or green/yellow discharge around incision site)     Medications:  Reconciled Home Medications:      Medication List      START taking these medications    oxyCODONE-acetaminophen 5-325 mg per tablet  Commonly known as:  PERCOCET  Take 1 tablet by mouth every 4 (four) hours as needed.        CHANGE how you take these medications    * ibuprofen 600 MG tablet  Commonly known as:  ADVIL,MOTRIN  Take 1 tablet (600 mg total) by mouth every 6 (six) hours as needed for Pain.  What changed:  Another medication with the same name was added. Make sure you understand how and when to take each.     * ibuprofen 600 MG tablet  Commonly known as:  ADVIL,MOTRIN  Take 1 tablet (600 mg total) by mouth 3 (three) times daily.  What changed:  You were already taking a medication with the same name, and this prescription was added. Make sure you understand how and when to take each.         * This list has 2 medication(s) that are the same as other medications prescribed for you. Read the directions carefully, and ask your doctor or other care provider to review them with you.            CONTINUE taking these medications    acetaminophen 650 MG Tbsr  Commonly known as:  TYLENOL  Take 650 mg by mouth as needed.     aspirin 81 MG EC tablet  Commonly known as:  ECOTRIN  Take 81 mg by mouth once daily.     biotin 1 mg Cap  Take by mouth.     cetirizine 10 MG tablet  Commonly known as:  ZYRTEC  Take 10 mg by mouth once daily.     diphenhydrAMINE 50 MG capsule  Commonly known as:  BENADRYL  Take  50 mg by mouth nightly as needed for Itching.     GAS-X ORAL  Take by mouth once daily.     krill oil 500 mg Cap  Take by mouth once daily.     LIALDA 1.2 gram Tbec  Generic drug:  mesalamine  2.4 g daily with breakfast.     lisinopril 10 MG tablet  Take 10 mg by mouth once daily.     loratadine 10 mg tablet  Commonly known as:  CLARITIN  Take 10 mg by mouth once daily.     OCUVITE ORAL  Take by mouth.     omeprazole 20 MG capsule  Commonly known as:  PRILOSEC  20 mg once daily.     pravastatin 20 MG tablet  Commonly known as:  PRAVACHOL  40 mg every evening.     VITAMIN C 100 MG tablet  Generic drug:  ascorbic acid (vitamin C)  Take 100 mg by mouth once daily.            Boby Santamaria MD  Obstetrics & Gynecology  [facility]

## 2019-03-14 NOTE — PLAN OF CARE
Problem: Adult Inpatient Plan of Care  Goal: Plan of Care Review  Pt currently in no distress at this time. Sats 95  %. Will continue to monitor.

## 2019-03-14 NOTE — PROGRESS NOTES
Progress Note  Gynecology    Admit Date: 3/13/2019  LOS: 0    Reason for Admission:  Complex endometrial hyperplasia, POP    SUBJECTIVE:     Jenny Caro is a 63 y.o.  who is POD 1 s/p scheduled RALH/BSO with sacrocolpoplexy and posterior repair. Overnight patient did well. Tolerated regular diet. Has not ambulated due to emndez in place. Reports pain very well controlled. No complaints.       OBJECTIVE:     Vital Signs   Temp:  [97.7 °F (36.5 °C)-98.4 °F (36.9 °C)] 98 °F (36.7 °C)  Pulse:  [74-90] 77  Resp:  [16-20] 20  SpO2:  [92 %-100 %] 92 %  BP: (100-147)/(53-76) 132/59      Intake/Output Summary (Last 24 hours) at 3/14/2019 0620  Last data filed at 3/14/2019 0445  Gross per 24 hour   Intake 1980 ml   Output 2295 ml   Net -315 ml       Physical Exam:  Gen: A&Ox3, NAD  CV: RRR  Pulm: LCTAB  Abd: active bowel sounds, soft, non-distended, non-tender to palpation without rebound or guarding  Inc: trocar sites c/d/i  Ext: PPP, no peripheral edema, TEDs/SCDs in place    Laboratory:  No results found for this or any previous visit (from the past 24 hour(s)).    Diagnostic Results:      ASSESSMENT/PLAN:     Active Hospital Problems    Diagnosis  POA    Uterovaginal prolapse, complete [N81.3]  Yes    S/P robot-assisted lap hyst/BSO/sacrocolpoplexy and posterior repair [Z98.890]  Not Applicable      Resolved Hospital Problems    Diagnosis Date Resolved POA    Complex endometrial hyperplasia [N85.01] 2019 Yes     Uterus, endometrial biopsy:  -Complex endometrial hyperplasia without atypia.         Assessment: 63 y.o. POD 1 s/p RALH/BSO/SCC/poserior repair, stable    Plan:   1. Post Op  - Oral pain meds  - AM labs pending  - Mendez removed, passive voiding trial  - Regular diet  - Encourage ambulation  - IS at bedside  - Lovenox for dvt prophylaxis    2. HTN  - Restart ACEi at home  - Hydralazine PRN for BSP>180    3. GERD  - Protonix    Boby Santamaria MD  OB/GYN PGY-3  Pager: [phone]

## 2019-03-14 NOTE — NURSING
"Patient voided 200 cc; patient notified about voiding trial study.  Patient refused in and out cath. Patient stated " I thought it was only written stuff." Dr Madrid notified.    Bladder scanned patient; 57 cc noted.  "

## 2019-03-14 NOTE — PLAN OF CARE
Problem: Adult Inpatient Plan of Care  Goal: Plan of Care Review  Outcome: Ongoing (interventions implemented as appropriate)  Pt remains free from falls. Vitals were stable throughout the night on room air. Positions self independently. Pain managed PO medications, no complaints of nausea. Bed in low position and call light within reach. Will continue to monitor.

## 2019-03-14 NOTE — PROGRESS NOTES
Post-Op Progress Note        Subjective:      Post-Op Day #0 after Davinci Hysterectomy BSO sacrocolpopexy post colporrhaphy cysto    Patient is without complaints. Vaginal bleeding none. Pain is well controlled. Tolerating Full Regular diet. Sitting up in bed. Overall patient is doing well.     Objective:      Temp:  [97.7 °F (36.5 °C)-98.4 °F (36.9 °C)] 98.3 °F (36.8 °C)  Pulse:  [74-90] 90  Resp:  [16-18] 18  SpO2:  [93 %-100 %] 96 %  BP: (100-147)/(53-76) 135/65    Intake/Output Summary (Last 24 hours) at 3/13/2019 2129  Last data filed at 3/13/2019 1900  Gross per 24 hour   Intake 1980 ml   Output 995 ml   Net 985 ml     Body mass index is 42.57 kg/m².    General: no acute distress  Abdomen: soft, non-tender, non-distended;clean, dry bandage in place   Extremities: non-tender, symmetric, no edema    Recent Results (from the past 336 hour(s))   CBC auto differential    Collection Time: 03/06/19 11:23 AM   Result Value Ref Range    WBC 7.08 3.90 - 12.70 K/uL    Hemoglobin 13.3 12.0 - 16.0 g/dL    Hematocrit 42.0 37.0 - 48.5 %    Platelets 288 150 - 350 K/uL       Assessment:     63 y.o. female S/P Davinci Hysterectomy BSO sacrocolpopexy post colporrhaphy cysto  , Post-Op Day #0  - Doing Well   Looks great this evening  Bryanna reg diet and min pain     Plan:     1. Continue routine post-operative care  2. Plan for D/C in AM if cont to do well

## 2019-03-14 NOTE — PROGRESS NOTES
[]Hide copied text    []Hover for details      Post-operative Void Trial           B. PASSIVE VOID TRIAL   1. Please collect urine sample and send to lab for urine culture. Unable to collect urine sample, mendez has already been emptied and removed.  2. Remove mendez. Record time here: 0629  3. Place hat in toilet. Patient has 4 hours to void. Please have patient call you once she voids.              --Record time of void here: 0729              --Record amount of void here: 200 cc  4. Within 30 minutes of void, please bladder scan patient and record volume here:  57cc  * If patient voids 2/3 of the total volume in her bladder, she may be discharged home without a mendez catheter. If the patient voids less than that or is unable to void within 4 hours, please replace the mendez catheter.      PLEASE PAGE GYN RESIDENT WITH RESULTS OF ABOVE VOID TRIAL PRIOR TO DISCHARGE!     If you have questions, please page the gyn resident at [phone]  If no response, please call Dr. Paredes at [phone]

## 2019-03-14 NOTE — PROGRESS NOTES
Boby Santamaria MD   Resident   Obstetrics and Gynecology   Progress Notes   Signed                           []Hide copied text    []Hover for details      Post-operative Void Trial           B. PASSIVE VOID TRIAL   1. Please collect urine sample and send to lab for urine culture. Unable to collect urine sample, mendez has already been emptied and removed.  2. Remove mendez. Record time here: 0629  3. Place hat in toilet. Patient has 4 hours to void. Please have patient call you once she voids.              --Record time of void here:               --Record amount of void here:   4. Within 30 minutes of void, please straight cath patient and record volume here:   * If patient voids 2/3 of the total volume in her bladder, she may be discharged home without a mendez catheter. If the patient voids less than that or is unable to void within 4 hours, please replace the mendez catheter.      PLEASE PAGE GYN RESIDENT WITH RESULTS OF ABOVE VOID TRIAL PRIOR TO DISCHARGE!     If you have questions, please page the gyn resident at [phone]  If no response, please call Dr. Paredes at [phone]

## 2019-03-14 NOTE — PROGRESS NOTES
Post-operative Void Trial        B. PASSIVE VOID TRIAL   1. Please collect urine sample and send to lab for urine culture.  2. Remove mendez. Record time here:   3. Place hat in toilet. Patient has 4 hours to void. Please have patient call you once she voids.   --Record time of void here:    --Record amount of void here:   4. Within 30 minutes of void, please straight cath patient and record volume here:   * If patient voids 2/3 of the total volume in her bladder, she may be discharged home without a mendez catheter. If the patient voids less than that or is unable to void within 4 hours, please replace the mendez catheter.     PLEASE PAGE GYN RESIDENT WITH RESULTS OF ABOVE VOID TRIAL PRIOR TO DISCHARGE!    If you have questions, please page the gyn resident at [phone]  If no response, please call Dr. Paredes at [phone]

## 2019-03-21 NOTE — PROGRESS NOTES
Please call and check on pt  S/p DAVINCI HYSTERECTOMY  And let her know that her path report from uterus and ovaries shows no abnormal cells/ no cancer cells

## 2019-03-23 ENCOUNTER — TELEPHONE (OUTPATIENT)
Dept: UROGYNECOLOGY | Facility: CLINIC | Age: 64
End: 2019-03-23

## 2019-03-27 ENCOUNTER — OFFICE VISIT (OUTPATIENT)
Dept: UROGYNECOLOGY | Facility: CLINIC | Age: 64
End: 2019-03-27
Payer: COMMERCIAL

## 2019-03-27 VITALS
WEIGHT: 244.5 LBS | SYSTOLIC BLOOD PRESSURE: 122 MMHG | DIASTOLIC BLOOD PRESSURE: 80 MMHG | HEIGHT: 63 IN | BODY MASS INDEX: 43.32 KG/M2

## 2019-03-27 DIAGNOSIS — N95.2 VAGINAL ATROPHY: ICD-10-CM

## 2019-03-27 DIAGNOSIS — Z98.890 POST-OPERATIVE STATE: Primary | ICD-10-CM

## 2019-03-27 DIAGNOSIS — N39.3 SUI (STRESS URINARY INCONTINENCE, FEMALE): ICD-10-CM

## 2019-03-27 PROBLEM — N81.2 UTEROVAGINAL PROLAPSE, INCOMPLETE: Status: RESOLVED | Noted: 2018-10-10 | Resolved: 2019-03-27

## 2019-03-27 PROBLEM — N81.6 POSTERIOR VAGINAL WALL PROLAPSE: Status: RESOLVED | Noted: 2018-11-14 | Resolved: 2019-03-27

## 2019-03-27 PROBLEM — N81.10 PROLAPSE OF ANTERIOR VAGINAL WALL: Status: RESOLVED | Noted: 2018-11-14 | Resolved: 2019-03-27

## 2019-03-27 PROBLEM — N81.11 CYSTOCELE, MIDLINE: Status: RESOLVED | Noted: 2017-10-08 | Resolved: 2019-03-27

## 2019-03-27 PROBLEM — N81.3 UTEROVAGINAL PROLAPSE, COMPLETE: Status: RESOLVED | Noted: 2019-03-13 | Resolved: 2019-03-27

## 2019-03-27 PROBLEM — N95.0 POSTMENOPAUSAL BLEEDING: Status: RESOLVED | Noted: 2018-10-10 | Resolved: 2019-03-27

## 2019-03-27 PROCEDURE — 99024 POSTOP FOLLOW-UP VISIT: CPT | Mod: S$GLB,,, | Performed by: NURSE PRACTITIONER

## 2019-03-27 PROCEDURE — 99999 PR PBB SHADOW E&M-EST. PATIENT-LVL III: ICD-10-PCS | Mod: PBBFAC,,, | Performed by: NURSE PRACTITIONER

## 2019-03-27 PROCEDURE — 99024 PR POST-OP FOLLOW-UP VISIT: ICD-10-PCS | Mod: S$GLB,,, | Performed by: NURSE PRACTITIONER

## 2019-03-27 PROCEDURE — 99999 PR PBB SHADOW E&M-EST. PATIENT-LVL III: CPT | Mod: PBBFAC,,, | Performed by: NURSE PRACTITIONER

## 2019-03-27 RX ORDER — PRAVASTATIN SODIUM 40 MG/1
TABLET ORAL
COMMUNITY
Start: 2019-03-08 | End: 2019-10-10

## 2019-03-27 NOTE — PATIENT INSTRUCTIONS
1. Post op healing well.  Continue to follow post op instructions.  2. She will follow up with us in 4 weeks

## 2019-03-27 NOTE — PROGRESS NOTES
Urogyn follow up  03/27/2019    [facility]  [address] [id number]    Jenny Caro  [id number]  1955      Jenny Caro is a 63 y.o.  here for a urogyn post op visit.      03/13/2019  PROCEDURE DATE: 03/13/2019     PROCEDURE:    ROBOTIC HYSTERECTOMY Dr. Russell  1. Robotic assisted Sacrocolpopexy with permanent mesh ([employer] Ref: [id number] Lot# [id number])   2. Cystourethroscopy  3. Posterior colporrhaphy           Past Medical History:   Diagnosis Date    Abnormal Pap smear of cervix 2009    LEEP 2009, mild dysplasia normal paps since    Arthritis     ankles  born with club feet    GERD (gastroesophageal reflux disease)     HPV in female     Hyperlipidemia     Hypertension     Ulcerative colitis        Past Surgical History:   Procedure Laterality Date    CERVICAL BIOPSY  W/ LOOP ELECTRODE EXCISION  2009    COLPORRHAPHY, POSTERIOR N/A 3/13/2019    Per[facility] by [facility] at [facility] OR    CYSTOSCOPY N/A 3/13/2019    [facility] at [facility]    DILATION AND CURETTAGE, UTERUS  11/20/2018    Per[facility] by Arvind Russell MD at [facility] OR    gastric sleeve  08/2015    HYSTEROSCOPY; TRUCLEAR RESECTION OF UTERINE POLYP N/A 11/20/2018    [facility] by [facility]ind Russell [facility] at [facility] OR    LAPAROSCOPIC GASTRIC BANDING  2006    ROBOTIC HYSTERECTOMY N/A 3/13/2019    [facility] by [facility]ind Russell [facility] at [facility] OR    ROBOTIC SACROCOLPOPEXY, ABDOMEN N/A 3/13/2019    [facility] by [facility] DO at [facility] OR    ROBOTIC SALPINGO-OOPHORECTOMY Bilateral 3/13/2019    Performed by Arvind Russell MD at [facility] OR     History since last visit:Scant pink discharge.   Denies pain or bleeding.  Bladder issues: Scant LEW.  Denies UUI, urinary urgency of frequency.  Bowel issues: Denies constipation or straining.  Has UC-- has not had trouble.     Medications:    Current Outpatient Medications:     acetaminophen (TYLENOL) 650 MG TbSR,  "Take 650 mg by mouth as needed., Disp: , Rfl:     ascorbic acid, vitamin C, (VITAMIN C) 100 MG tablet, Take 100 mg by mouth once daily., Disp: , Rfl:     aspirin (ECOTRIN) 81 MG EC tablet, Take 81 mg by mouth once daily., Disp: , Rfl:     BETA-CAROTENE,A, W-C & E/MIN (OCUVITE ORAL), Take by mouth., Disp: , Rfl:     biotin 1 mg Cap, Take by mouth., Disp: , Rfl:     cetirizine (ZYRTEC) 10 MG tablet, Take 10 mg by mouth once daily., Disp: , Rfl:     diphenhydrAMINE (BENADRYL) 50 MG capsule, Take 50 mg by mouth nightly as needed for Itching., Disp: , Rfl:     ibuprofen (ADVIL,MOTRIN) 600 MG tablet, Take 1 tablet (600 mg total) by mouth every 6 (six) hours as needed for Pain., Disp: 30 tablet, Rfl: 1    krill oil 500 mg Cap, Take by mouth once daily., Disp: , Rfl:     LIALDA 1.2 gram TbEC, 2.4 g daily with breakfast. , Disp: , Rfl:     lisinopril 10 MG tablet, Take 10 mg by mouth once daily., Disp: , Rfl:     loratadine (CLARITIN) 10 mg tablet, Take 10 mg by mouth once daily., Disp: , Rfl:     omeprazole (PRILOSEC) 20 MG capsule, 20 mg once daily. , Disp: , Rfl:     pravastatin (PRAVACHOL) 40 MG tablet, , Disp: , Rfl:     simethicone (GAS-X ORAL), Take by mouth once daily. , Disp: , Rfl:       ROS:  As per HPI.      Exam  /80 (BP Location: Left arm, Patient Position: Sitting, BP Method: Large (Manual))   Ht 5' 3" (1.6 m)   Wt 110.9 kg (244 lb 7.8 oz)   BMI 43.31 kg/m²   General: alert and oriented, no acute distress  Respiratory: normal respiratory effort  Abd: soft, non-tender, non-distended    Pelvic  Ext. Genitalia: normal external genitalia. Normal bartholin's and skeens glands  Vagina: + atrophy. Normal vaginal mucosa without lesions. No discharge noted.  Incisions healing well-- sutures intact.  No mesh visible/ palpable.  Non-tender bladder base without palpable mass.  Cervix: absent  Uterus:  absent   Urethra: no masses or tenderness  Urethral meatus: no lesions, caruncle or prolapse.    No " obvious prolapse    Impression  1. Post-operative state     2. LEW (stress urinary incontinence, female)     3. Vaginal atrophy       We reviewed the above issues and discussed options for short-term versus long-term management of her problems.   Plan:   1. Post op healing well.  Continue to follow post op instructions.  2. She will follow up with us in 4 weeks.  20 minutes were spent in face to face time with this patient  90 % of this time was spent in counseling and/or coordination of care     Teresa Sanchez NP  [facility]  Division of Female Pelvic Medicine and Reconstructive Surgery  Department of Obstetrics & Gynecology

## 2019-03-29 ENCOUNTER — HOSPITAL ENCOUNTER (EMERGENCY)
Facility: OTHER | Age: 64
Discharge: HOME | End: 2019-03-29
Attending: EMERGENCY MEDICINE
Payer: COMMERCIAL

## 2019-03-29 ENCOUNTER — TELEPHONE (OUTPATIENT)
Dept: UROGYNECOLOGY | Facility: CLINIC | Age: 64
End: 2019-03-29

## 2019-03-29 ENCOUNTER — TELEPHONE (OUTPATIENT)
Dept: OBSTETRICS AND GYNECOLOGY | Facility: CLINIC | Age: 64
End: 2019-03-29

## 2019-03-29 VITALS
OXYGEN SATURATION: 97 % | HEIGHT: 63 IN | SYSTOLIC BLOOD PRESSURE: 140 MMHG | RESPIRATION RATE: 11 BRPM | HEART RATE: 90 BPM | BODY MASS INDEX: 42.52 KG/M2 | TEMPERATURE: 99 F | WEIGHT: 240 LBS | DIASTOLIC BLOOD PRESSURE: 69 MMHG

## 2019-03-29 DIAGNOSIS — N39.0 URINARY TRACT INFECTION WITHOUT HEMATURIA, SITE UNSPECIFIED: ICD-10-CM

## 2019-03-29 DIAGNOSIS — N76.0 VAGINAL CUFF CELLULITIS: Primary | ICD-10-CM

## 2019-03-29 DIAGNOSIS — R50.9 ACUTE FEBRILE ILLNESS: ICD-10-CM

## 2019-03-29 LAB
ALBUMIN SERPL BCP-MCNC: 3.1 G/DL (ref 3.5–5.2)
ALP SERPL-CCNC: 86 U/L (ref 55–135)
ALT SERPL W/O P-5'-P-CCNC: 13 U/L (ref 10–44)
ANION GAP SERPL CALC-SCNC: 8 MMOL/L (ref 8–16)
AST SERPL-CCNC: 10 U/L (ref 10–40)
BACTERIA #/AREA URNS HPF: ABNORMAL /HPF
BASOPHILS # BLD AUTO: 0.01 K/UL (ref 0–0.2)
BASOPHILS NFR BLD: 0.1 % (ref 0–1.9)
BILIRUB SERPL-MCNC: 0.5 MG/DL (ref 0.1–1)
BILIRUB UR QL STRIP: NEGATIVE
BUN SERPL-MCNC: 14 MG/DL (ref 8–23)
CALCIUM SERPL-MCNC: 9.4 MG/DL (ref 8.7–10.5)
CHLORIDE SERPL-SCNC: 104 MMOL/L (ref 95–110)
CLARITY UR: CLEAR
CO2 SERPL-SCNC: 26 MMOL/L (ref 23–29)
COLOR UR: YELLOW
CREAT SERPL-MCNC: 0.8 MG/DL (ref 0.5–1.4)
DIFFERENTIAL METHOD: ABNORMAL
EOSINOPHIL # BLD AUTO: 0.2 K/UL (ref 0–0.5)
EOSINOPHIL NFR BLD: 1.3 % (ref 0–8)
ERYTHROCYTE [DISTWIDTH] IN BLOOD BY AUTOMATED COUNT: 14.5 % (ref 11.5–14.5)
EST. GFR  (AFRICAN AMERICAN): >60 ML/MIN/1.73 M^2
EST. GFR  (NON AFRICAN AMERICAN): >60 ML/MIN/1.73 M^2
GLUCOSE SERPL-MCNC: 180 MG/DL (ref 70–110)
GLUCOSE UR QL STRIP: NEGATIVE
HCT VFR BLD AUTO: 37.6 % (ref 37–48.5)
HGB BLD-MCNC: 12.1 G/DL (ref 12–16)
HGB UR QL STRIP: ABNORMAL
KETONES UR QL STRIP: NEGATIVE
LACTATE SERPL-SCNC: 1.1 MMOL/L (ref 0.5–2.2)
LEUKOCYTE ESTERASE UR QL STRIP: ABNORMAL
LYMPHOCYTES # BLD AUTO: 1.5 K/UL (ref 1–4.8)
LYMPHOCYTES NFR BLD: 10.8 % (ref 18–48)
MAGNESIUM SERPL-MCNC: 2.3 MG/DL (ref 1.6–2.6)
MCH RBC QN AUTO: 30 PG (ref 27–31)
MCHC RBC AUTO-ENTMCNC: 32.2 G/DL (ref 32–36)
MCV RBC AUTO: 93 FL (ref 82–98)
MICROSCOPIC COMMENT: ABNORMAL
MONOCYTES # BLD AUTO: 1.3 K/UL (ref 0.3–1)
MONOCYTES NFR BLD: 8.8 % (ref 4–15)
NEUTROPHILS # BLD AUTO: 11.2 K/UL (ref 1.8–7.7)
NEUTROPHILS NFR BLD: 78.7 % (ref 38–73)
NITRITE UR QL STRIP: NEGATIVE
PH UR STRIP: 6 [PH] (ref 5–8)
PHOSPHATE SERPL-MCNC: 3 MG/DL (ref 2.7–4.5)
PLATELET # BLD AUTO: 298 K/UL (ref 150–350)
PMV BLD AUTO: 10 FL (ref 9.2–12.9)
POTASSIUM SERPL-SCNC: 3.9 MMOL/L (ref 3.5–5.1)
PROCALCITONIN SERPL IA-MCNC: 0.08 NG/ML
PROT SERPL-MCNC: 7.4 G/DL (ref 6–8.4)
PROT UR QL STRIP: ABNORMAL
RBC # BLD AUTO: 4.03 M/UL (ref 4–5.4)
RBC #/AREA URNS HPF: 3 /HPF (ref 0–4)
SODIUM SERPL-SCNC: 138 MMOL/L (ref 136–145)
SP GR UR STRIP: 1.02 (ref 1–1.03)
SQUAMOUS #/AREA URNS HPF: 3 /HPF
URN SPEC COLLECT METH UR: ABNORMAL
UROBILINOGEN UR STRIP-ACNC: NEGATIVE EU/DL
WBC # BLD AUTO: 14.17 K/UL (ref 3.9–12.7)
WBC #/AREA URNS HPF: 16 /HPF (ref 0–5)

## 2019-03-29 PROCEDURE — 84145 PROCALCITONIN (PCT): CPT

## 2019-03-29 PROCEDURE — 87186 SC STD MICRODIL/AGAR DIL: CPT

## 2019-03-29 PROCEDURE — 84100 ASSAY OF PHOSPHORUS: CPT

## 2019-03-29 PROCEDURE — 81000 URINALYSIS NONAUTO W/SCOPE: CPT

## 2019-03-29 PROCEDURE — 87086 URINE CULTURE/COLONY COUNT: CPT

## 2019-03-29 PROCEDURE — 80053 COMPREHEN METABOLIC PANEL: CPT

## 2019-03-29 PROCEDURE — 87070 CULTURE OTHR SPECIMN AEROBIC: CPT

## 2019-03-29 PROCEDURE — 99244 PR OFFICE CONSULTATION,LEVEL IV: ICD-10-PCS | Mod: ,,, | Performed by: OBSTETRICS & GYNECOLOGY

## 2019-03-29 PROCEDURE — 87040 BLOOD CULTURE FOR BACTERIA: CPT

## 2019-03-29 PROCEDURE — 83605 ASSAY OF LACTIC ACID: CPT

## 2019-03-29 PROCEDURE — 99284 EMERGENCY DEPT VISIT MOD MDM: CPT

## 2019-03-29 PROCEDURE — 99244 OFF/OP CNSLTJ NEW/EST MOD 40: CPT | Mod: ,,, | Performed by: OBSTETRICS & GYNECOLOGY

## 2019-03-29 PROCEDURE — 87075 CULTR BACTERIA EXCEPT BLOOD: CPT

## 2019-03-29 PROCEDURE — 85025 COMPLETE CBC W/AUTO DIFF WBC: CPT

## 2019-03-29 PROCEDURE — 83735 ASSAY OF MAGNESIUM: CPT

## 2019-03-29 PROCEDURE — 87076 CULTURE ANAEROBE IDENT EACH: CPT

## 2019-03-29 PROCEDURE — 87077 CULTURE AEROBIC IDENTIFY: CPT

## 2019-03-29 RX ORDER — NITROFURANTOIN 25; 75 MG/1; MG/1
100 CAPSULE ORAL 2 TIMES DAILY
Qty: 10 CAPSULE | Refills: 0 | Status: SHIPPED | OUTPATIENT
Start: 2019-03-29 | End: 2019-03-29 | Stop reason: ALTCHOICE

## 2019-03-29 RX ORDER — AMOXICILLIN AND CLAVULANATE POTASSIUM 875; 125 MG/1; MG/1
1 TABLET, FILM COATED ORAL 2 TIMES DAILY
Qty: 14 TABLET | Refills: 0 | Status: ON HOLD | OUTPATIENT
Start: 2019-03-29 | End: 2019-04-03 | Stop reason: SDUPTHER

## 2019-03-29 NOTE — TELEPHONE ENCOUNTER
"Pt states she has a temp of 101.5 and she feeling "a little achy" . Spoke to Dr. Marino that referred pt to the ED. Explained to pt that Dr. Lozano is out of the office however, I spoke with Dr. Marino, her partner in the office and she would like her to go to the ER. I further explained per Dr. Marino, we'd like her to get evaluated to make sure she doesn't have things like pneumonia or a UTI. Pt voiced understanding and call ended.   "

## 2019-03-29 NOTE — TELEPHONE ENCOUNTER
Pt states she took her temperature at 2am and it was 102. She then took it this morning when she woke up and it was 98.5. Pt states she isn't having any other symptoms but she'd like us to know.  I informed the pt I'd relay this message to STEVE Sanchez and one of us would get back to her. Pt voiced understanding and call ended.

## 2019-03-29 NOTE — TELEPHONE ENCOUNTER
----- Message from Arvind Russell MD sent at 3/21/2019  9:07 AM CDT -----  Please call and check on pt  S/p DAVINCI HYSTERECTOMY  And let her know that her path report from uterus and ovaries shows no abnormal cells/ no cancer cells

## 2019-03-29 NOTE — ED PROVIDER NOTES
"Encounter Date: 3/29/2019    SCRIBE #1 NOTE: I, Steffanie Cedillo am scribing for, and in the presence of, Dr. Thompson.       History     Chief Complaint   Patient presents with    Fever     fever up 102. denies any symptoms but had hysterectomy 2 wks ago. denies any abd discomfort     Time seen by provider: 5:30 PM    This is a 63 y.o. female with hx of ulcerative colitis and HTN who presents with complaint of fever since yesterday. She reports Tmax of 102°F in the middle of the night, decreasing after 4 AM. Pt states that fever florian again this morning, but "broke" PTA. Pt reports associated myalgias only with elevated temperature. She reports laparoscopic hysterectomy with Dr. Russell on 3/13, with normal post-op appointment. She reports no abdominal pain. She has mild vaginal discharge/bleeding, which she states "is expected". She states that her wounds are healing well. She denies any chills, sore throat, nasal congestion, N/V, chest pain, SOB, neck pain, neck stiffness, leg swelling beyond baseline, HA, weakness, urinary sx, and rash. She reports having had her flu shot this season. She does not smoke, drink, or use any illicit drugs. She reports allergy to ciprofloxacin and bactrim. She has PSHx of laparoscopic gastric band and gastric sleeve.    The history is provided by the patient.     Review of patient's allergies indicates:   Allergen Reactions    Ciprofloxacin Swelling    Bactrim [sulfamethoxazole-trimethoprim] Rash     Past Medical History:   Diagnosis Date    Abnormal Pap smear of cervix 2009    LEEP 2009, mild dysplasia normal paps since    Arthritis     ankles  born with club feet    GERD (gastroesophageal reflux disease)     HPV in female     Hyperlipidemia     Hypertension     Ulcerative colitis      Past Surgical History:   Procedure Laterality Date    CERVICAL BIOPSY  W/ LOOP ELECTRODE EXCISION  2009    COLPORRHAPHY, POSTERIOR N/A 3/13/2019    [facility] by [facility] DO at " [facility] OR    CYSTOSCOPY N/A 3/13/2019    [facility] at [facility]    DILATION AND CURETTAGE, UTERUS  11/20/2018    [facility] by [facility]ind [facility] at [facility] OR    gastric sleeve  08/2015    HYSTEROSCOPY; TRUCLEAR RESECTION OF UTERINE POLYP N/A 11/20/2018    [facility]formed by [facility] at [facility] OR    LAPAROSCOPIC GASTRIC BANDING  2006    ROBOTIC HYSTERECTOMY N/A 3/13/2019    [facility] by [facility] at [facility] OR    ROBOTIC SACROCOLPOPEXY, ABDOMEN N/A 3/13/2019    Performed by [facility] DO at [facility] OR    ROBOTIC SALPINGO-OOPHORECTOMY Bilateral 3/13/2019    Performed by [facility]ind Russell MD at [facility] OR     Family History   Problem Relation Age of Onset    Diabetes Mother     Heart disease Father     Stroke Sister     Diabetes Sister     Breast cancer Neg Hx      Social History     Tobacco Use    Smoking status: Never Smoker    Smokeless tobacco: Never Used   Substance Use Topics    Alcohol use: No    Drug use: No     Review of Systems   Constitutional: Positive for fever. Negative for chills.   HENT: Negative for congestion and sore throat.    Eyes: Negative for visual disturbance.   Respiratory: Negative for cough and shortness of breath.    Cardiovascular: Positive for leg swelling (chronic, unchanged). Negative for chest pain and palpitations.   Gastrointestinal: Negative for abdominal pain, diarrhea, nausea and vomiting.   Genitourinary: Positive for vaginal bleeding (mild) and vaginal discharge (mild). Negative for decreased urine volume and dysuria.   Musculoskeletal: Positive for myalgias. Negative for joint swelling, neck pain and neck stiffness.   Skin: Negative for rash and wound.   Neurological: Negative for weakness, numbness and headaches.   Psychiatric/Behavioral: Negative for confusion.       Physical Exam     Initial Vitals [03/29/19 1653]   BP Pulse Resp Temp SpO2   (!) 140/84 99 18 99.3 °F (37.4 °C) 96 %      MAP       --         Physical Exam    Nursing  note and vitals reviewed.  Constitutional: She appears well-developed and well-nourished. No distress.   HENT:   Head: Normocephalic and atraumatic.   Mouth/Throat: Oropharynx is clear and moist.   Eyes: Conjunctivae and EOM are normal. Pupils are equal, round, and reactive to light.   Neck: Normal range of motion. Neck supple.   Cardiovascular: Normal rate, regular rhythm and normal heart sounds.   No murmur heard.  Pulmonary/Chest: Breath sounds normal. No respiratory distress. She has no wheezes. She has no rhonchi. She has no rales.   Abdominal: Soft. Bowel sounds are normal. There is no tenderness.   Musculoskeletal: Normal range of motion.   Neurological: She is alert and oriented to person, place, and time. She has normal strength. No cranial nerve deficit or sensory deficit.   Skin: Skin is warm and dry. No rash noted.   Few abdominal laparoscopic incisions that are clean, dry, and intact. Some with scab present. No erythema, fluctuance, or discharge.    Psychiatric: She has a normal mood and affect. Her behavior is normal.         ED Course   Procedures  Labs Reviewed   URINALYSIS, REFLEX TO URINE CULTURE - Abnormal; Notable for the following components:       Result Value    Protein, UA Trace (*)     Occult Blood UA Trace (*)     Leukocytes, UA 1+ (*)     All other components within normal limits    Narrative:     Preferred Collection Type->Urine, Clean Catch   CBC W/ AUTO DIFFERENTIAL - Abnormal; Notable for the following components:    WBC 14.17 (*)     Gran # (ANC) 11.2 (*)     Mono # 1.3 (*)     Gran% 78.7 (*)     Lymph% 10.8 (*)     All other components within normal limits   COMPREHENSIVE METABOLIC PANEL - Abnormal; Notable for the following components:    Glucose 180 (*)     Albumin 3.1 (*)     All other components within normal limits   URINALYSIS MICROSCOPIC - Abnormal; Notable for the following components:    WBC, UA 16 (*)     Bacteria, UA Moderate (*)     All other components within normal  limits    Narrative:     Preferred Collection Type->Urine, Clean Catch   CULTURE, BLOOD   CULTURE, BLOOD   CULTURE, URINE   CULTURE, ANAEROBIC   CULTURE, AEROBIC  (SPECIFY SOURCE)   LACTIC ACID, PLASMA   MAGNESIUM   PHOSPHORUS   PROCALCITONIN   LACTIC ACID, PLASMA          Imaging Results          X-Ray Chest AP Portable (Final result)  Result time 03/29/19 18:01:55    Final result by Jacob Lindsay MD (03/29/19 18:01:55)                 Impression:      No detrimental change or radiographic acute intrathoracic process seen.  Specifically, no focal consolidation seen.    Suspected small to moderate-sized hiatal hernia.  Correlate clinically.      Electronically signed by: Jacob Lindsay MD  Date:    03/29/2019  Time:    18:01             Narrative:    EXAMINATION:  XR CHEST AP PORTABLE    CLINICAL HISTORY:  Sepsis;    TECHNIQUE:  Single frontal view of the chest was performed.    COMPARISON:  Chest radiograph 08/25/2018    FINDINGS:  No detrimental change.  Cardiac silhouette is mildly enlarged similar to prior without evidence of failure.  Mediastinal contours are within normal limits for age noting suspected small to moderate-sized hiatal hernia which is slightly smaller/less distended from 08/25/2018 exam.  Pulmonary vasculature and hilar contours are within normal limits.  Few scattered linear opacities at the lung bases consistent with subsegmental scarring versus atelectasis.  The lungs are otherwise symmetrically well expanded without focal consolidation, pleural effusion or pneumothorax.  No acute osseous process seen.  PA and lateral views can be obtained.                              X-Rays:   Independently Interpreted Readings:   Chest X-Ray: No infiltrate, effusion, or PTX.     Medical Decision Making:   Independently Interpreted Test(s):   I have ordered and independently interpreted X-rays - see prior notes.  Clinical Tests:   Lab Tests: Ordered and Reviewed  Radiological Study: Ordered and Reviewed  ED  Management:  7:15 PM - I discussed the pt with Dr. Coulter, who will come evaluate the pt.  7:55 PM - Dr. Coulter is at the bedside.    Emergent evaluation of 63-year-old female who is 2 weeks status post laparoscopic hysterectomy and presents with complaint of fever.  Vital signs here are benign, afebrile.  She denies any complaints other than the fever and some associated myalgias, no obvious source by history.  Physical exam is benign, surgical incisions are well healed with no infection, no obvious source by exam.  Sepsis workup reveals elevated WBC, normal lactic acid, negative procalcitonin.  Urinalysis does show evidence of a UTI, but not significant enough to cause a temperature of 102°.  I discussed the case with the labor wrist, who evaluated the patient in the ED.  She noted vaginal cuff cellulitis on her exam.  She recommends Augmentin to cover for the cellulitis as well as UTI.  The patient is discharged with prescription for Augmentin b.i.d. x1 week and close follow-up with her doctor or to return to the ED for new or worsening symptoms.            Scribe Attestation:   Scribe #1: I performed the above scribed service and the documentation accurately describes the services I performed. I attest to the accuracy of the note.    Attending Attestation:           Physician Attestation for Scribe:  Physician Attestation Statement for Scribe #1: I, Dr. Thompson, reviewed documentation, as scribed by Steffanie Cedillo in my presence, and it is both accurate and complete.                    Clinical Impression:     1. Vaginal cuff cellulitis    2. Acute febrile illness    3. Urinary tract infection without hematuria, site unspecified                                 Inga Thompson MD  03/29/19 6525

## 2019-03-29 NOTE — ED NOTES
Report received from Jacoby HOLLIDAY at bedside, patient sitting up in bed AAOx3, NAD, denies pain, or any needs at this time. Updated on status, and POC. Call light within reach. Will continue to monitor.

## 2019-03-29 NOTE — TELEPHONE ENCOUNTER
----- Message from Ramiro Anton sent at 3/29/2019  9:07 AM CDT -----  PLEASE CALL PP PT ON LAST NIGHT SHE WAS RUNNING A FEVER  [phone]

## 2019-03-30 ENCOUNTER — TELEPHONE (OUTPATIENT)
Dept: UROGYNECOLOGY | Facility: CLINIC | Age: 64
End: 2019-03-30

## 2019-03-30 NOTE — HPI
63 y.o.  post op 2 weeks from a robotic assisted hysterectomy and sacro-colpopexy who presents with subjective fevers at home. She states she had as high as 102F with associated chills. Denies abdominal pain or vaginal discharge. She has been compliant with pelvic rest. Denies N/V.

## 2019-03-30 NOTE — ASSESSMENT & PLAN NOTE
Afebrile in ED. Erythema and exquisite tenderness at vaginal cuff with minimal mucopurulent drainage, no evidence of defect; WBC 14  Patient allergic to Cipro, Bactrim; will treat with Augmentin BID  No abdominal pain - no indication to image abdomen at this time as I do not believe she has an intraabdominal process  Aerobic and anaerobic vaginal cultures taken  I will message surgeons involved in case regarding getting patient follow up within 1 week  ED physician will treat UTI as well (Augmentin will cover)  Return precautions given

## 2019-03-30 NOTE — CONSULTS
[facility]  Obstetrics & Gynecology  Consult Note    Patient Name: Jenny Caro  MRN: [id number]  Admission Date: 3/29/2019  Hospital Length of Stay: 0 days  Code Status: Prior  Primary Care Provider: Eliceo Beatty MD  Principal Problem: Vaginal cuff cellulitis    Consults  Subjective:     Chief Complaint: fever    History of Present Illness:  63 y.o.  post op 2 weeks from a robotic assisted hysterectomy and sacro-colpopexy who presents with subjective fevers at home. She states she had as high as 102F with associated chills. Denies abdominal pain or vaginal discharge. She has been compliant with pelvic rest. Denies N/V.        OB History    Para Term  AB Living   2 2 2 0 0 2   SAB TAB Ectopic Multiple Live Births   0 0 0 0 2      # Outcome Date GA Lbr Guero/2nd Weight Sex Delivery Anes PTL Lv   2 Term      Vag-Spont      1 Term      Vag-Spont        Past Medical History:   Diagnosis Date    Abnormal Pap smear of cervix     LEEP , mild dysplasia normal paps since    Arthritis     ankles  born with club feet    GERD (gastroesophageal reflux disease)     HPV in female     Hyperlipidemia     Hypertension     Ulcerative colitis      Past Surgical History:   Procedure Laterality Date    CERVICAL BIOPSY  W/ LOOP ELECTRODE EXCISION      COLPORRHAPHY, POSTERIOR N/A 3/13/2019    Performed by [facility]ui [facility] [place]    CYSTOSCOPY N/A 3/13/2019    Per[facility] by [facility] [place]    DILATION AND CURETTAGE, UTERUS  2018    Performed by Arvind Russell [facility] at [facility] [place]    gastric sleeve  2015    HYSTEROSCOPY; TRUCLEAR RESECTION OF UTERINE POLYP N/A 2018    Performed by [facility]vind Russell [facility] at [facility] [place]    LAPAROSCOPIC GASTRIC BANDING      ROBOTIC HYSTERECTOMY N/A 3/13/2019    Performed by [facility]ind Russell [facility] at [facility] [place]    ROBOTIC SACROCOLPOPEXY, ABDOMEN N/A 3/13/2019    Performed by Rui [facility] at [facility] [place]     ROBOTIC SALPINGO-OOPHORECTOMY Bilateral 3/13/2019    Performed by [facility]ind [facility] MD at [facility] OR         (Not in a hospital admission)    Review of patient's allergies indicates:   Allergen Reactions    Ciprofloxacin Swelling    Bactrim [sulfamethoxazole-trimethoprim] Rash        Family History     Problem Relation (Age of Onset)    Diabetes Mother, Sister    Heart disease Father    Stroke Sister        Tobacco Use    Smoking status: Never Smoker    Smokeless tobacco: Never Used   Substance and Sexual Activity    Alcohol use: No    Drug use: No    Sexual activity: Yes     Partners: Male     Birth control/protection: Post-menopausal     Review of Systems   Constitutional: Positive for chills and fever.   Eyes: Negative for visual disturbance.   Respiratory: Negative for cough and shortness of breath.    Cardiovascular: Negative for chest pain and leg swelling.   Gastrointestinal: Negative for abdominal pain, nausea and vomiting.   Genitourinary: Negative for dysuria, flank pain, frequency, urgency and vaginal bleeding.   Integumentary:  Negative for breast mass.   Neurological: Negative for syncope and headaches.   Psychiatric/Behavioral: Negative for depression. The patient is not nervous/anxious.    All other systems reviewed and are negative.  Breast: Negative for mass and mastodynia     Objective:     Vital Signs (Most Recent):  Temp: 99.3 °F (37.4 °C) (03/29/19 1653)  Pulse: 88 (03/29/19 1859)  Resp: 18 (03/29/19 1859)  BP: 135/68 (03/29/19 1859)  SpO2: 96 % (03/29/19 1859) Vital Signs (24h Range):  Temp:  [99.3 °F (37.4 °C)] 99.3 °F (37.4 °C)  Pulse:  [88-99] 88  Resp:  [17-18] 18  SpO2:  [95 %-96 %] 96 %  BP: (134-146)/(63-84) 135/68     Weight: 108.9 kg (240 lb)  Body mass index is 42.51 kg/m².    No LMP recorded. Patient is postmenopausal.    Physical Exam:   Constitutional: She is oriented to person, place, and time. She appears well-developed and well-nourished. No distress.    HENT:   Head:  Normocephalic and atraumatic.      Cardiovascular: Normal rate and regular rhythm.     Pulmonary/Chest: Effort normal. No respiratory distress.        Abdominal: Soft. She exhibits abdominal incision (small port sites well healing). She exhibits no distension. There is no rebound and no guarding.     Genitourinary: Vaginal discharge found.   Genitourinary Comments: Difficult exam due to limited patient tolerance, patient exquisitely tender at the top of cuff with associated erythema, no defect when probed with qtip; minimal mucopurulent drainage           Musculoskeletal: Normal range of motion and moves all extremeties.       Neurological: She is alert and oriented to person, place, and time.    Skin: Skin is warm.    Psychiatric: She has a normal mood and affect. Judgment and thought content normal.       Laboratory:  CBC:   Recent Labs   Lab 03/29/19  1750   WBC 14.17*   RBC 4.03   HGB 12.1   HCT 37.6      MCV 93   MCH 30.0   MCHC 32.2       Diagnostic Results:  Labs: Reviewed  X-Ray: Reviewed    Assessment/Plan:     * Vaginal cuff cellulitis  Erythema and exquisite tenderness at vaginal cuff with minimal mucopurulent drainage, no evidence of defect; WBC 14  Patient allergic to Cipro, Bactrim; will treat with Augmentin BID  No abdominal pain - no indication to image abdomen at this time as I do not believe she has an intraabdominal process  Aerobic and anaerobic vaginal cultures taken  I will message surgeons involved in case regarding getting patient follow up within 1 week  ED physician will treat UTI as well (Augmentin will cover)  Return precautions given      Thank you for your consult. I will sign off. Please contact us if you have any additional questions.    Rosa Coulter MD  Obstetrics & Gynecology  [facility]

## 2019-03-30 NOTE — TELEPHONE ENCOUNTER
Spoke with patient this morning. She visited ER last night and was sent home with Augmentin. Denies any fever since last night. Per exam last night, questionable cuff cellulitis, but exam was difficult due to patient intolerance. Denies nausea, vomiting, fever, chills, vaginal bleeding, significant pain. Advised patient to start Augmentin, will add metronidazole BID x seven days for a broader coverage of vaginal frank.Will have Patient come to clinic Monday 4/1/19 for evaluation. Gave patient fever, Pain, bleeding, general for cautions. Advised to call the day for any concerning issues. If patient does return, would consider course of IV into biotics to calm down process and imaging if indicated. Especially since patient had mesh procedure. Will make a GYN resident on call aware of situation and let them know to call me if needed. Patient voiced understanding. Dr. Lozano ccd so shes aware.  ------------  Eliana Monk Jyra, or T: Please call patient first thing Monday morning 4/1 and see if she can come in the office around 2 PM. Please just book on Olga schedule, and I will see her. Thank you!

## 2019-03-30 NOTE — SUBJECTIVE & OBJECTIVE
OB History    Para Term  AB Living   2 2 2 0 0 2   SAB TAB Ectopic Multiple Live Births   0 0 0 0 2      # Outcome Date GA Lbr Guero/2nd Weight Sex Delivery Anes PTL Lv   2 Term      Vag-Spont      1 Term      Vag-Spont        Past Medical History:   Diagnosis Date    Abnormal Pap smear of cervix     LEEP , mild dysplasia normal paps since    Arthritis     ankles  born with club feet    GERD (gastroesophageal reflux disease)     HPV in female     Hyperlipidemia     Hypertension     Ulcerative colitis      Past Surgical History:   Procedure Laterality Date    CERVICAL BIOPSY  W/ LOOP ELECTRODE EXCISION      COLPORRHAPHY, POSTERIOR N/A 3/13/2019    Performed by Rui Lozano DO at [facility] [place]    CYSTOSCOPY N/A 3/13/2019    Performed by [facility]ui [facility]ano [facility] at [facility] [place]    DILATION AND CURETTAGE, UTERUS  2018    Performed by Arvind Russell MD at [facility] [place]    gastric sleeve  2015    HYSTEROSCOPY; TRUCLEAR RESECTION OF UTERINE POLYP N/A 2018    Performed by [facility]vind Russell [facility] at [facility] [place]    LAPAROSCOPIC GASTRIC BANDING  2006    ROBOTIC HYSTERECTOMY N/A 3/13/2019    Performed by Ar[facility]ind Russell [facility] at [facility] [place]    ROBOTIC SACROCOLPOPEXY, ABDOMEN N/A 3/13/2019    Performed by Rui L[facility]ano DO at [facility] [place]    ROBOTIC SALPINGO-OOPHORECTOMY Bilateral 3/13/2019    Performed by Arvind Russell MD at [facility] [place]         (Not in a hospital admission)    Review of patient's allergies indicates:   Allergen Reactions    Ciprofloxacin Swelling    Bactrim [sulfamethoxazole-trimethoprim] Rash        Family History     Problem Relation (Age of Onset)    Diabetes Mother, Sister    Heart disease Father    Stroke Sister        Tobacco Use    Smoking status: Never Smoker    Smokeless tobacco: Never Used   Substance and Sexual Activity    Alcohol use: No    Drug use: No    Sexual activity: Yes     Partners: Male     Birth control/protection: Post-menopausal     Review of  Systems   Constitutional: Positive for chills and fever.   Eyes: Negative for visual disturbance.   Respiratory: Negative for cough and shortness of breath.    Cardiovascular: Negative for chest pain and leg swelling.   Gastrointestinal: Negative for abdominal pain, nausea and vomiting.   Genitourinary: Negative for dysuria, flank pain, frequency, urgency and vaginal bleeding.   Integumentary:  Negative for breast mass.   Neurological: Negative for syncope and headaches.   Psychiatric/Behavioral: Negative for depression. The patient is not nervous/anxious.    All other systems reviewed and are negative.  Breast: Negative for mass and mastodynia     Objective:     Vital Signs (Most Recent):  Temp: 99.3 °F (37.4 °C) (03/29/19 1653)  Pulse: 88 (03/29/19 1859)  Resp: 18 (03/29/19 1859)  BP: 135/68 (03/29/19 1859)  SpO2: 96 % (03/29/19 1859) Vital Signs (24h Range):  Temp:  [99.3 °F (37.4 °C)] 99.3 °F (37.4 °C)  Pulse:  [88-99] 88  Resp:  [17-18] 18  SpO2:  [95 %-96 %] 96 %  BP: (134-146)/(63-84) 135/68     Weight: 108.9 kg (240 lb)  Body mass index is 42.51 kg/m².    No LMP recorded. Patient is postmenopausal.    Physical Exam:   Constitutional: She is oriented to person, place, and time. She appears well-developed and well-nourished. No distress.    HENT:   Head: Normocephalic and atraumatic.      Cardiovascular: Normal rate and regular rhythm.     Pulmonary/Chest: Effort normal. No respiratory distress.        Abdominal: Soft. She exhibits abdominal incision (small port sites well healing). She exhibits no distension. There is no rebound and no guarding.     Genitourinary: Vaginal discharge found.   Genitourinary Comments: Difficult exam due to limited patient tolerance, patient exquisitely tender at the top of cuff with associated erythema, no defect when probed with qtip           Musculoskeletal: Normal range of motion and moves all extremeties.       Neurological: She is alert and oriented to person, place, and  time.    Skin: Skin is warm.    Psychiatric: She has a normal mood and affect. Judgment and thought content normal.       Laboratory:  CBC:   Recent Labs   Lab 03/29/19  1750   WBC 14.17*   RBC 4.03   HGB 12.1   HCT 37.6      MCV 93   MCH 30.0   MCHC 32.2       Diagnostic Results:  Labs: Reviewed  X-Ray: Reviewed

## 2019-03-31 LAB — BACTERIA UR CULT: NO GROWTH

## 2019-04-01 ENCOUNTER — OFFICE VISIT (OUTPATIENT)
Dept: UROGYNECOLOGY | Facility: CLINIC | Age: 64
DRG: 863 | End: 2019-04-01
Payer: COMMERCIAL

## 2019-04-01 ENCOUNTER — TELEPHONE (OUTPATIENT)
Dept: OBSTETRICS AND GYNECOLOGY | Facility: CLINIC | Age: 64
End: 2019-04-01

## 2019-04-01 ENCOUNTER — TELEPHONE (OUTPATIENT)
Dept: UROGYNECOLOGY | Facility: CLINIC | Age: 64
End: 2019-04-01

## 2019-04-01 ENCOUNTER — HOSPITAL ENCOUNTER (OUTPATIENT)
Dept: RADIOLOGY | Facility: HOSPITAL | Age: 64
Discharge: HOME | DRG: 863 | End: 2019-04-01
Attending: OBSTETRICS & GYNECOLOGY
Payer: COMMERCIAL

## 2019-04-01 ENCOUNTER — HOSPITAL ENCOUNTER (INPATIENT)
Facility: OTHER | Age: 64
LOS: 1 days | Discharge: HOME | DRG: 863 | End: 2019-04-03
Attending: OBSTETRICS & GYNECOLOGY | Admitting: OBSTETRICS & GYNECOLOGY
Payer: COMMERCIAL

## 2019-04-01 VITALS
BODY MASS INDEX: 43.2 KG/M2 | HEIGHT: 63 IN | SYSTOLIC BLOOD PRESSURE: 148 MMHG | DIASTOLIC BLOOD PRESSURE: 82 MMHG | WEIGHT: 243.81 LBS

## 2019-04-01 DIAGNOSIS — N89.8 VAGINAL DISCHARGE: Primary | ICD-10-CM

## 2019-04-01 DIAGNOSIS — N76.0 VAGINAL CUFF CELLULITIS: ICD-10-CM

## 2019-04-01 DIAGNOSIS — T81.49XA POSTOPERATIVE ABSCESS: ICD-10-CM

## 2019-04-01 DIAGNOSIS — N89.8 VAGINAL DISCHARGE: ICD-10-CM

## 2019-04-01 LAB
ANION GAP SERPL CALC-SCNC: 8 MMOL/L (ref 8–16)
BASOPHILS # BLD AUTO: 0.02 K/UL (ref 0–0.2)
BASOPHILS NFR BLD: 0.3 % (ref 0–1.9)
BUN SERPL-MCNC: 19 MG/DL (ref 8–23)
CALCIUM SERPL-MCNC: 9.5 MG/DL (ref 8.7–10.5)
CHLORIDE SERPL-SCNC: 106 MMOL/L (ref 95–110)
CO2 SERPL-SCNC: 26 MMOL/L (ref 23–29)
CREAT SERPL-MCNC: 0.8 MG/DL (ref 0.5–1.4)
DIFFERENTIAL METHOD: ABNORMAL
EOSINOPHIL # BLD AUTO: 0.5 K/UL (ref 0–0.5)
EOSINOPHIL NFR BLD: 8 % (ref 0–8)
ERYTHROCYTE [DISTWIDTH] IN BLOOD BY AUTOMATED COUNT: 14.2 % (ref 11.5–14.5)
EST. GFR  (AFRICAN AMERICAN): >60 ML/MIN/1.73 M^2
EST. GFR  (NON AFRICAN AMERICAN): >60 ML/MIN/1.73 M^2
GLUCOSE SERPL-MCNC: 86 MG/DL (ref 70–110)
HCT VFR BLD AUTO: 35.4 % (ref 37–48.5)
HGB BLD-MCNC: 11.3 G/DL (ref 12–16)
LYMPHOCYTES # BLD AUTO: 1.7 K/UL (ref 1–4.8)
LYMPHOCYTES NFR BLD: 25.6 % (ref 18–48)
MCH RBC QN AUTO: 29.9 PG (ref 27–31)
MCHC RBC AUTO-ENTMCNC: 31.9 G/DL (ref 32–36)
MCV RBC AUTO: 94 FL (ref 82–98)
MONOCYTES # BLD AUTO: 0.6 K/UL (ref 0.3–1)
MONOCYTES NFR BLD: 8.1 % (ref 4–15)
NEUTROPHILS # BLD AUTO: 3.9 K/UL (ref 1.8–7.7)
NEUTROPHILS NFR BLD: 57.7 % (ref 38–73)
PLATELET # BLD AUTO: 341 K/UL (ref 150–350)
PMV BLD AUTO: 9.4 FL (ref 9.2–12.9)
POTASSIUM SERPL-SCNC: 4.9 MMOL/L (ref 3.5–5.1)
RBC # BLD AUTO: 3.78 M/UL (ref 4–5.4)
SODIUM SERPL-SCNC: 140 MMOL/L (ref 136–145)
WBC # BLD AUTO: 6.77 K/UL (ref 3.9–12.7)

## 2019-04-01 PROCEDURE — G0378 HOSPITAL OBSERVATION PER HR: HCPCS

## 2019-04-01 PROCEDURE — G0379 DIRECT REFER HOSPITAL OBSERV: HCPCS

## 2019-04-01 PROCEDURE — 99999 PR PBB SHADOW E&M-EST. PATIENT-LVL III: CPT | Mod: PBBFAC,,, | Performed by: OBSTETRICS & GYNECOLOGY

## 2019-04-01 PROCEDURE — 99024 PR POST-OP FOLLOW-UP VISIT: ICD-10-PCS | Mod: S$GLB,,, | Performed by: OBSTETRICS & GYNECOLOGY

## 2019-04-01 PROCEDURE — 85025 COMPLETE CBC W/AUTO DIFF WBC: CPT

## 2019-04-01 PROCEDURE — 25500020 PHARM REV CODE 255: Performed by: OBSTETRICS & GYNECOLOGY

## 2019-04-01 PROCEDURE — 74177 CT ABDOMEN PELVIS WITH CONTRAST: ICD-10-PCS | Mod: 26,,, | Performed by: RADIOLOGY

## 2019-04-01 PROCEDURE — 80048 BASIC METABOLIC PNL TOTAL CA: CPT

## 2019-04-01 PROCEDURE — S0030 INJECTION, METRONIDAZOLE: HCPCS | Performed by: OBSTETRICS & GYNECOLOGY

## 2019-04-01 PROCEDURE — 74177 CT ABD & PELVIS W/CONTRAST: CPT | Mod: TC

## 2019-04-01 PROCEDURE — 99024 POSTOP FOLLOW-UP VISIT: CPT | Mod: S$GLB,,, | Performed by: OBSTETRICS & GYNECOLOGY

## 2019-04-01 PROCEDURE — 74177 CT ABD & PELVIS W/CONTRAST: CPT | Mod: 26,,, | Performed by: RADIOLOGY

## 2019-04-01 PROCEDURE — 36415 COLL VENOUS BLD VENIPUNCTURE: CPT

## 2019-04-01 PROCEDURE — 25000003 PHARM REV CODE 250: Performed by: OBSTETRICS & GYNECOLOGY

## 2019-04-01 PROCEDURE — 99999 PR PBB SHADOW E&M-EST. PATIENT-LVL III: ICD-10-PCS | Mod: PBBFAC,,, | Performed by: OBSTETRICS & GYNECOLOGY

## 2019-04-01 RX ORDER — SODIUM CHLORIDE 0.9 % (FLUSH) 0.9 %
10 SYRINGE (ML) INJECTION
Status: DISCONTINUED | OUTPATIENT
Start: 2019-04-01 | End: 2019-04-03 | Stop reason: HOSPADM

## 2019-04-01 RX ORDER — METRONIDAZOLE 500 MG/1
TABLET ORAL
Refills: 0 | Status: ON HOLD | COMMUNITY
Start: 2019-03-30 | End: 2019-04-03 | Stop reason: HOSPADM

## 2019-04-01 RX ORDER — ACETAMINOPHEN 325 MG/1
650 TABLET ORAL EVERY 6 HOURS PRN
Status: DISCONTINUED | OUTPATIENT
Start: 2019-04-01 | End: 2019-04-03 | Stop reason: HOSPADM

## 2019-04-01 RX ORDER — DIPHENHYDRAMINE HCL 25 MG
50 CAPSULE ORAL NIGHTLY PRN
Status: DISCONTINUED | OUTPATIENT
Start: 2019-04-01 | End: 2019-04-03 | Stop reason: HOSPADM

## 2019-04-01 RX ORDER — HYDROCODONE BITARTRATE AND ACETAMINOPHEN 5; 325 MG/1; MG/1
1 TABLET ORAL EVERY 4 HOURS PRN
Status: DISCONTINUED | OUTPATIENT
Start: 2019-04-01 | End: 2019-04-03 | Stop reason: HOSPADM

## 2019-04-01 RX ORDER — IBUPROFEN 600 MG/1
600 TABLET ORAL EVERY 6 HOURS PRN
Status: DISCONTINUED | OUTPATIENT
Start: 2019-04-01 | End: 2019-04-03 | Stop reason: HOSPADM

## 2019-04-01 RX ORDER — HYDROCODONE BITARTRATE AND ACETAMINOPHEN 10; 325 MG/1; MG/1
1 TABLET ORAL EVERY 4 HOURS PRN
Status: DISCONTINUED | OUTPATIENT
Start: 2019-04-01 | End: 2019-04-03 | Stop reason: HOSPADM

## 2019-04-01 RX ORDER — LISINOPRIL 10 MG/1
10 TABLET ORAL DAILY
Status: DISCONTINUED | OUTPATIENT
Start: 2019-04-02 | End: 2019-04-03 | Stop reason: HOSPADM

## 2019-04-01 RX ORDER — METRONIDAZOLE 500 MG/100ML
500 INJECTION, SOLUTION INTRAVENOUS
Status: DISCONTINUED | OUTPATIENT
Start: 2019-04-01 | End: 2019-04-03 | Stop reason: HOSPADM

## 2019-04-01 RX ORDER — PRAVASTATIN SODIUM 20 MG/1
40 TABLET ORAL DAILY
Status: DISCONTINUED | OUTPATIENT
Start: 2019-04-02 | End: 2019-04-03 | Stop reason: HOSPADM

## 2019-04-01 RX ORDER — PANTOPRAZOLE SODIUM 40 MG/1
40 TABLET, DELAYED RELEASE ORAL DAILY
Status: DISCONTINUED | OUTPATIENT
Start: 2019-04-02 | End: 2019-04-03 | Stop reason: HOSPADM

## 2019-04-01 RX ORDER — ONDANSETRON 8 MG/1
8 TABLET, ORALLY DISINTEGRATING ORAL EVERY 8 HOURS PRN
Status: DISCONTINUED | OUTPATIENT
Start: 2019-04-01 | End: 2019-04-03 | Stop reason: HOSPADM

## 2019-04-01 RX ADMIN — METRONIDAZOLE 500 MG: 500 INJECTION, SOLUTION INTRAVENOUS at 11:04

## 2019-04-01 RX ADMIN — IOHEXOL 15 ML: 350 INJECTION, SOLUTION INTRAVENOUS at 06:04

## 2019-04-01 RX ADMIN — IOHEXOL 100 ML: 350 INJECTION, SOLUTION INTRAVENOUS at 07:04

## 2019-04-01 RX ADMIN — IOHEXOL 15 ML: 350 INJECTION, SOLUTION INTRAVENOUS at 05:04

## 2019-04-01 NOTE — TELEPHONE ENCOUNTER
----- Message from Fatou Marino MD sent at 3/30/2019 12:32 PM CDT -----  Regarding: patient needs appt 4/1 for 2PM  Spoke with patient this morning. She visited ER last night and was sent home with Augmentin. Denies any fever since last night. Per exam last night, questionable cuff cellulitis, but exam was difficult due to patient intolerance. Denies nausea, vomiting, fever, chills, vaginal bleeding, significant pain. Advised patient to start Augmentin, will add metronidazole BID x seven days for a broader coverage of vaginal frank.Will have Patient come to clinic Monday 4/1/19 for evaluation. Gave patient fever, Pain, bleeding, general for cautions. Advised to call the day for any concerning issues. If patient does return, would consider course of IV into biotics to calm down process and imaging if indicated. Especially since patient had mesh procedure. Will make a GYN resident on call aware of situation and let them know to call me if needed. Patient voiced understanding. Dr. Lozano cc'd so she's aware.  ------  Eliana Monk Jyra, odette CALIXTO: Please call patient first thing Monday morning 4/1 and see if she can come in the office around 2 PM. Please just overbook on Teresa's schedule, and I will see her. Thank you!

## 2019-04-01 NOTE — TELEPHONE ENCOUNTER
Spoke with pt and explained to her that she probably received letter from insurance first, but we should receive and once we do we will send the medical records. Pt understands and will wait until she receives something from her ins or [facility].

## 2019-04-01 NOTE — LETTER
April 1, 2019        Rui Lozano,   [address]  [place] [address]             [facility]  [address] [address]  Phone: [phone]   Patient: Jenny Caro   MR Number: [id number]   YOB: 1955   Date of Visit: 4/1/2019       Dear Dr. Lozano:    Thank you for referring Jenny Caro to me for evaluation. Below are the relevant portions of my assessment and plan of care.            If you have questions, please do not hesitate to call me. I look forward to following Jenny along with you.    Sincerely,      MD MIGUEL ÁNGEL Alvarez MD

## 2019-04-01 NOTE — TELEPHONE ENCOUNTER
Pt said she received a letter from [facility] stating they need more info regarding her surgery proving that it was medically necessary before they approve the claim. She spoke with the billing office and they told her that she should contact her dr's office.  [facility] # [phone]  Fax # [phone]

## 2019-04-01 NOTE — TELEPHONE ENCOUNTER
----- Message from Rui Lozano DO sent at 3/31/2019 12:45 PM CDT -----  Regarding: RE: patient needs appt 4/1 for 2PM  Thank you for seeing her. I spoke with her today. She's feeling much better. No new fevers since the Saturday morning. Please let me know how things look when you see her on Monday. I've cc'd Dr. Russell so that she is also in the loop. Please also schedule here to be seen on the week of April 8th.     ----- Message -----  From: Fatou Marino MD  Sent: 3/30/2019  12:32 PM  To: Teresa Sanchez NP, Lacho Wylie MA, #  Subject: patient needs appt 4/1 for 2PM                   Spoke with patient this morning. She visited ER last night and was sent home with Augmentin. Denies any fever since last night. Per exam last night, questionable cuff cellulitis, but exam was difficult due to patient intolerance. Denies nausea, vomiting, fever, chills, vaginal bleeding, significant pain. Advised patient to start Augmentin, will add metronidazole BID x seven days for a broader coverage of vaginal frank.Will have Patient come to clinic Monday 4/1/19 for evaluation. Gave patient fever, Pain, bleeding, general for cautions. Advised to call the day for any concerning issues. If patient does return, would consider course of IV into biotics to calm down process and imaging if indicated. Especially since patient had mesh procedure. Will make a GYN resident on call aware of situation and let them know to call me if needed. Patient voiced understanding. Dr. Lozano cc'd so she's aware.  ------  Eliana Monk Jyra, or T: Please call patient first thing Monday morning 4/1 and see if she can come in the office around 2 PM. Please just overbook on Teresa's schedule, and I will see her. Thank you!

## 2019-04-01 NOTE — TELEPHONE ENCOUNTER
----- Message from Fariha Oliva sent at 4/1/2019  8:40 AM CDT -----  Contact: hoang  Name of Who is Calling: hoang      What is the request in detail: Patient is requesting a call back she states she does not know if she can make in in today for 2 she states since she started taking Metronidazole 500mg she has had Diarrhea and wants to know should she continue taking the med's       Can the clinic reply by [facility]: no      What Number to Call Back if not in [facility]: [phone]

## 2019-04-01 NOTE — TELEPHONE ENCOUNTER
Attempted to contact pt to see if she could come into the office today at 2 pm for an evaluation, no answer left message for her to contact the office.

## 2019-04-01 NOTE — TELEPHONE ENCOUNTER
Patient agrees to come in today.  Instructed to hold flagyl until she sees Dr. Marino.  Denies fever at this time. Did have watery diarrhea x 5 this AM (only took augmentin this AM).  Denies discharge or abdominal pain.  Teresa Sanchez, FNP-BC

## 2019-04-01 NOTE — TELEPHONE ENCOUNTER
Spoke with pt to confirm that she will be able to come in at 2 pm today to see  to be evaulate for her current symptoms, pt states she has diarrhea that she think is being caused by the Metronidazole that she has been recently prescribed. Pt states she does not think she can leave the house in come in for an examination due to the diarrhea being so bad, Advised pt I will need to consult the doctor and give her a call back, pt voiced understanding and call was ended.

## 2019-04-01 NOTE — PROGRESS NOTES
Urogyn follow up  04/01/2019    [facility] UROGYN FILIBERTO BLDG FL 4  [address] [address]    Jenny Caro  [id number]  1955      Jenny Caro is a 63 y.o. here for a urogyn follow up.    3/13/19:  PROCEDURE:    1. Robotic assisted Sacrocolpopexy with permanent mesh ([employer] Ref: [id number] Lot# [id number])   2. Cystourethroscopy  3. Posterior colporrhaphy     Issues include:  Patient Active Problem List   Diagnosis    HTN (hypertension)    Gastroesophageal reflux disease    Ulcerative colitis    Benign essential hypertension    S/P robot-assisted lap hyst/BSO/sacrocolpoplexy and posterior repair    Vaginal cuff cellulitis     History since last visit: last fever 3/29/19; highest temp 3/30/19 was 99F.  No temps 3/31 or today.  Taking ibuprofen PRN.  No narcotic meds.   Bladder issues: having some mild LEW with cough--new. +no dysuria, U/F. +still sense of incomplete emptying. Urine C&S 3/29/19 NEG.  PVR preop 110 mL. No UDS.     Bowel issues: + loose stool with ABX.  No abd cramping.  No hematochezia.    Concern for cuff cellulitis:  Was seen in ED 3/29/19. Taking Augmentin/Flagyl.  Some loose stool.   last fever 3/29/19; highest temp 3/30/19 was 99F.  No temps 3/31 or today.  Taking ibuprofen PRN.  No narcotic meds.  3/29/19 blood C&S neg.  Urine C&S neg.  Vaginal C&S gm neg rods, non-differentiated. Patient reports some mild vaginal discharge yesterday.  Had large gush of green-gray discharge after voiding before exam today.     Medications:    Current Outpatient Medications:     acetaminophen (TYLENOL) 650 MG TbSR, Take 650 mg by mouth as needed., Disp: , Rfl:     amoxicillin-clavulanate 875-125mg (AUGMENTIN) 875-125 mg per tablet, Take 1 tablet by mouth 2 (two) times daily., Disp: 14 tablet, Rfl: 0    ascorbic acid, vitamin C, (VITAMIN C) 100 MG tablet, Take 100 mg by mouth once daily., Disp: , Rfl:     aspirin (ECOTRIN) 81 MG EC tablet, Take 81 mg by mouth  "once daily., Disp: , Rfl:     BETA-CAROTENE,A, W-C & E/MIN (OCUVITE ORAL), Take by mouth., Disp: , Rfl:     biotin 1 mg Cap, Take by mouth., Disp: , Rfl:     cetirizine (ZYRTEC) 10 MG tablet, Take 10 mg by mouth once daily., Disp: , Rfl:     diphenhydrAMINE (BENADRYL) 50 MG capsule, Take 50 mg by mouth nightly as needed for Itching., Disp: , Rfl:     ibuprofen (ADVIL,MOTRIN) 600 MG tablet, Take 1 tablet (600 mg total) by mouth every 6 (six) hours as needed for Pain., Disp: 30 tablet, Rfl: 1    krill oil 500 mg Cap, Take by mouth once daily., Disp: , Rfl:     LIALDA 1.2 gram TbEC, 2.4 g daily with breakfast. , Disp: , Rfl:     lisinopril 10 MG tablet, Take 10 mg by mouth once daily., Disp: , Rfl:     loratadine (CLARITIN) 10 mg tablet, Take 10 mg by mouth once daily., Disp: , Rfl:     metroNIDAZOLE (FLAGYL) 500 MG tablet, TK ONE T PO BID FOR 7 DAYS, Disp: , Rfl: 0    omeprazole (PRILOSEC) 20 MG capsule, 20 mg once daily. , Disp: , Rfl:     pravastatin (PRAVACHOL) 40 MG tablet, , Disp: , Rfl:     simethicone (GAS-X ORAL), Take by mouth once daily. , Disp: , Rfl:      ROS:  As per HPI.      Exam  BP (!) 148/82 (BP Location: Left arm, Patient Position: Sitting, BP Method: Large (Manual))   Ht 5' 3" (1.6 m)   Wt 110.6 kg (243 lb 13.3 oz)   BMI 43.19 kg/m²   General: alert and oriented, no acute distress  Respiratory: normal respiratory effort  Abd: soft, non-tender, non-distended; LSC sites well-healing.     Pelvic  Ext. Genitalia: normal external genitalia. Normal bartholin's and skenes glands  Vagina: NEG atrophy. Normal vaginal mucosa without lesions. No discharge noted.   Non-tender bladder base without palpable mass. Posterior repair suture line intact, well-healing.  Mild yellow-green vaginal discharge in vault.  No odor.  Cuff with mild separation, partial-thickness at midline. No active drainage with valsalva, no mesh visualized.  Mild erythema and induration at mid-cuff.    Cervix: " absent  Uterus:  absent   Urethra: no masses or tenderness  Urethral meatus: no lesions, caruncle or prolapse.    POP-Q:  Deferred. No POP with valsalva.     Impression  No diagnosis found.  We reviewed the above issues and discussed options for short-term versus long-term management of her problems.   Plan:   1. Postop state:   a. Concern for cuff cellulitis vs abscess.  CT A/P w/ & w/o contrast ordered now--will follow up today to determine need for IV ABX. Patient aware that she may need to be admitted.   b. Continue PO augmentin/flagyl.   c. Call beforehand with T >100.4, increasing abdominal pain, significant increase in vaginal discharge/bleeding.   2. Mild LEW:  a. Discussed that occult LEW can happen temporarily aftter POP surgery.    b. Reassess several months postop.  If still present consider PT or other intervention.   c. Urine C&S 3/29/19 NEG.  Some  cross-coverage with augmentin.   30 minutes were spent in face to face time with this patient  75 % of this time was spent in counseling and/or coordination of care     Fatou Marino MD  [facility]  Division of Female Pelvic Medicine and Reconstructive Surgery  Department of Obstetrics & Gynecology

## 2019-04-01 NOTE — TELEPHONE ENCOUNTER
Spoke to pt   No fever since Friday night and no other symptoms  Overall feeling well    Going to see Dr Marino this aftenoon and has appt with me on Thursday

## 2019-04-02 ENCOUNTER — TELEPHONE (OUTPATIENT)
Dept: UROGYNECOLOGY | Facility: CLINIC | Age: 64
End: 2019-04-02

## 2019-04-02 PROBLEM — T81.49XA POSTOPERATIVE ABSCESS: Status: ACTIVE | Noted: 2019-04-02

## 2019-04-02 PROBLEM — E78.5 HYPERLIPIDEMIA: Status: ACTIVE | Noted: 2019-04-02

## 2019-04-02 LAB — BACTERIA SPEC AEROBE CULT: NORMAL

## 2019-04-02 PROCEDURE — S0030 INJECTION, METRONIDAZOLE: HCPCS | Performed by: OBSTETRICS & GYNECOLOGY

## 2019-04-02 PROCEDURE — 25000003 PHARM REV CODE 250: Performed by: OBSTETRICS & GYNECOLOGY

## 2019-04-02 PROCEDURE — 87186 SC STD MICRODIL/AGAR DIL: CPT

## 2019-04-02 PROCEDURE — 11000001 HC ACUTE MED/SURG PRIVATE ROOM

## 2019-04-02 PROCEDURE — 25000003 PHARM REV CODE 250: Performed by: STUDENT IN AN ORGANIZED HEALTH CARE EDUCATION/TRAINING PROGRAM

## 2019-04-02 PROCEDURE — 87077 CULTURE AEROBIC IDENTIFY: CPT

## 2019-04-02 PROCEDURE — 63600175 PHARM REV CODE 636 W HCPCS: Performed by: OBSTETRICS & GYNECOLOGY

## 2019-04-02 PROCEDURE — 87070 CULTURE OTHR SPECIMN AEROBIC: CPT

## 2019-04-02 PROCEDURE — 87075 CULTR BACTERIA EXCEPT BLOOD: CPT

## 2019-04-02 RX ORDER — CETIRIZINE HYDROCHLORIDE 10 MG/1
10 TABLET ORAL DAILY
Status: DISCONTINUED | OUTPATIENT
Start: 2019-04-02 | End: 2019-04-03 | Stop reason: HOSPADM

## 2019-04-02 RX ORDER — MESALAMINE 1.2 G/1
2.4 TABLET, DELAYED RELEASE ORAL
Status: DISCONTINUED | OUTPATIENT
Start: 2019-04-02 | End: 2019-04-03 | Stop reason: HOSPADM

## 2019-04-02 RX ADMIN — PIPERACILLIN AND TAZOBACTAM 4.5 G: 4; .5 INJECTION, POWDER, LYOPHILIZED, FOR SOLUTION INTRAVENOUS; PARENTERAL at 12:04

## 2019-04-02 RX ADMIN — METRONIDAZOLE 500 MG: 500 INJECTION, SOLUTION INTRAVENOUS at 03:04

## 2019-04-02 RX ADMIN — PRAVASTATIN SODIUM 40 MG: 20 TABLET ORAL at 08:04

## 2019-04-02 RX ADMIN — PIPERACILLIN AND TAZOBACTAM 4.5 G: 4; .5 INJECTION, POWDER, LYOPHILIZED, FOR SOLUTION INTRAVENOUS; PARENTERAL at 05:04

## 2019-04-02 RX ADMIN — IBUPROFEN 600 MG: 600 TABLET ORAL at 08:04

## 2019-04-02 RX ADMIN — PANTOPRAZOLE SODIUM 40 MG: 40 TABLET, DELAYED RELEASE ORAL at 09:04

## 2019-04-02 RX ADMIN — LISINOPRIL 10 MG: 10 TABLET ORAL at 09:04

## 2019-04-02 RX ADMIN — CETIRIZINE HYDROCHLORIDE 10 MG: 10 TABLET, FILM COATED ORAL at 08:04

## 2019-04-02 RX ADMIN — PIPERACILLIN AND TAZOBACTAM 4.5 G: 4; .5 INJECTION, POWDER, LYOPHILIZED, FOR SOLUTION INTRAVENOUS; PARENTERAL at 11:04

## 2019-04-02 RX ADMIN — METRONIDAZOLE 500 MG: 500 INJECTION, SOLUTION INTRAVENOUS at 06:04

## 2019-04-02 RX ADMIN — PIPERACILLIN AND TAZOBACTAM 4.5 G: 4; .5 INJECTION, POWDER, LYOPHILIZED, FOR SOLUTION INTRAVENOUS; PARENTERAL at 06:04

## 2019-04-02 RX ADMIN — METRONIDAZOLE 500 MG: 500 INJECTION, SOLUTION INTRAVENOUS at 11:04

## 2019-04-02 NOTE — ASSESSMENT & PLAN NOTE
- VSS, afebrile  - CT scan concerning for pelvic abscess, final report pending  - WBC 14 >> 6  - Will hold PO Augmentin/Flagyl, and start IV Zosyn/Flagyl  - Aerobe/Anaerobe Cx from vaginal discharge on 3/29: Gram Neg Rods  - Urine Cx from 3/29 negative  - Blood Cx from 3/29 prelim negative  - Obtained repeat Aerobe/Anaerobe Cx from vaginal discharge  - Motrin/Norco for pain PRN  - Zofran PRN  - Consider IR consult in AM for drainage  - Regular diet >> NPO at midnight  - TEDs/SCDs for DVT ppx

## 2019-04-02 NOTE — H&P
[facility]  Obstetrics & Gynecology  History & Physical    Patient Name: Jenny Caro  MRN: [id number]  Admission Date: 2019  Primary Care Provider: Eliceo Beatty MD    Subjective:     Chief Complaint/Reason for Admission: Postop Abscess    History of Present Illness:  Patient is a 63 y.o. , POD#19 from a robotic assisted hysterectomy/sacro-colpopexy/posterior colporraphy who presents as a direct admit for management of postop abscess.  Patient seen in ED on 3/29 with subjective fevers/chills, and noted to have tenderness and erythema at the vaginal cuff. She was subsequently diagnosed with vaginal cuff cellulitis and prescribed Augmentin/Flagyl.  She was seen for follow up today in urogyn clinic, and noted to have green-gray vaginal discharge on exam. CT abd/pelvis was obtained, and was concerning for a pelvic abscess.    Patient reports last fever was 3/29. She currently denies any complaints, other than a scant vaginal discharge. No abdominal/pelvic pain, vaginal bleeding, dysuria, nausea, or vomiting. States she has been tolerating a regular diet at home without difficulty. Has not required pain medications for postop pain/cramping.     Of note, patient's PMH is significant for ulcerative colitis, HTN, GERD, and Hyperlipidemia.           OB History    Para Term  AB Living   2 2 2 0 0 2   SAB TAB Ectopic Multiple Live Births   0 0 0 0 2      # Outcome Date GA Lbr Guero/2nd Weight Sex Delivery Anes PTL Lv   2 Term      Vag-Spont      1 Term      Vag-Spont        Past Medical History:   Diagnosis Date    Abnormal Pap smear of cervix 2009    LEEP , mild dysplasia normal paps since    Arthritis     ankles  born with club feet    GERD (gastroesophageal reflux disease)     HPV in female     Hyperlipidemia     Hypertension     Ulcerative colitis      Past Surgical History:   Procedure Laterality Date    CERVICAL BIOPSY  W/ LOOP ELECTRODE EXCISION       COLPORRHAPHY, POSTERIOR N/A 3/13/2019    [facility]    CYSTOSCOPY N/A 3/13/2019    [facility]    DILATION AND CURETTAGE, UTERUS  11/20/2018    [facility]vind Russell MD at [facility]    gastric sleeve  08/2015    HYSTEROSCOPY; TRUCLEAR RESECTION OF UTERINE POLYP N/A 11/20/2018    [facility] Russell [facility] at [facility]    LAPAROSCOPIC GASTRIC BANDING  2006    ROBOTIC HYSTERECTOMY N/A 3/13/2019    [facility] Russell [facility] at [facility]    ROBOTIC SACROCOLPOPEXY, ABDOMEN N/A 3/13/2019    Per[facility] by [facility]ano DO at [facility]    ROBOTIC SALPINGO-OOPHORECTOMY Bilateral 3/13/2019    Performed by [facility]ind Russell MD at [facility]       PTA Medications   Medication Sig    acetaminophen (TYLENOL) 650 MG TbSR Take 650 mg by mouth as needed.    amoxicillin-clavulanate 875-125mg (AUGMENTIN) 875-125 mg per tablet Take 1 tablet by mouth 2 (two) times daily.    ascorbic acid, vitamin C, (VITAMIN C) 100 MG tablet Take 100 mg by mouth once daily.    aspirin (ECOTRIN) 81 MG EC tablet Take 81 mg by mouth once daily.    BETA-CAROTENE,A, W-C & E/MIN (OCUVITE ORAL) Take by mouth.    biotin 1 mg Cap Take by mouth.    cetirizine (ZYRTEC) 10 MG tablet Take 10 mg by mouth once daily.    diphenhydrAMINE (BENADRYL) 50 MG capsule Take 50 mg by mouth nightly as needed for Itching.    ibuprofen (ADVIL,MOTRIN) 600 MG tablet Take 1 tablet (600 mg total) by mouth every 6 (six) hours as needed for Pain.    krill oil 500 mg Cap Take by mouth once daily.    LIALDA 1.2 gram TbEC 2.4 g daily with breakfast.     lisinopril 10 MG tablet Take 10 mg by mouth once daily.    loratadine (CLARITIN) 10 mg tablet Take 10 mg by mouth once daily.    metroNIDAZOLE (FLAGYL) 500 MG tablet TK ONE T PO BID FOR 7 DAYS    omeprazole (PRILOSEC) 20 MG capsule 20 mg once daily.     pravastatin (PRAVACHOL) 40 MG tablet     simethicone (GAS-X ORAL) Take by mouth  once daily.        Review of patient's allergies indicates:   Allergen Reactions    Ciprofloxacin Swelling    Bactrim [sulfamethoxazole-trimethoprim] Rash        Family History     Problem Relation (Age of Onset)    Diabetes Mother, Sister    Heart disease Father    Stroke Sister        Tobacco Use    Smoking status: Never Smoker    Smokeless tobacco: Never Used   Substance and Sexual Activity    Alcohol use: No    Drug use: No    Sexual activity: Yes     Partners: Male     Birth control/protection: Post-menopausal     Review of Systems   Constitutional: Negative for chills and fever.   Respiratory: Negative for cough and shortness of breath.    Cardiovascular: Negative for chest pain.   Gastrointestinal: Negative for abdominal pain, constipation, nausea and vomiting.   Genitourinary: Positive for vaginal discharge. Negative for dysuria, flank pain, hematuria, pelvic pain, vaginal bleeding and urinary incontinence.   Musculoskeletal: Negative for back pain and myalgias.   Neurological: Negative for syncope and headaches.      Objective:     Vital Signs (Most Recent):  Temp: 98.2 °F (36.8 °C) (04/02/19 0020)  Pulse: 73 (04/02/19 0020)  Resp: 18 (04/02/19 0020)  BP: 121/64 (04/02/19 0020)  SpO2: 96 % (04/02/19 0020) Vital Signs (24h Range):  Temp:  [98.1 °F (36.7 °C)-98.2 °F (36.8 °C)] 98.2 °F (36.8 °C)  Pulse:  [73-92] 73  Resp:  [18] 18  SpO2:  [94 %-96 %] 96 %  BP: (121-148)/(64-82) 121/64     Weight: 109.8 kg (242 lb 1 oz)  Body mass index is 42.88 kg/m².    No LMP recorded. Patient is postmenopausal.    Physical Exam:   Constitutional: She is oriented to person, place, and time. She appears well-developed and well-nourished. No distress.    HENT:   Head: Normocephalic and atraumatic.    Eyes: Conjunctivae and EOM are normal.    Neck: Normal range of motion. Neck supple. No thyromegaly present.    Cardiovascular: Normal rate and regular rhythm.     Pulmonary/Chest: Effort normal. No respiratory distress.         Abdominal: Soft. She exhibits no distension. There is no tenderness.     Genitourinary: Vaginal discharge (minimal) found.           Musculoskeletal: She exhibits no edema or tenderness.       Neurological: She is alert and oriented to person, place, and time.    Skin: Skin is warm and dry. No rash noted.    Psychiatric: She has a normal mood and affect. Her behavior is normal.       Laboratory:  BMP:   Recent Labs   Lab 04/01/19  2254   GLU 86      K 4.9      CO2 26   BUN 19   CREATININE 0.8   CALCIUM 9.5     CBC:   Recent Labs   Lab 04/01/19  2254   WBC 6.77   RBC 3.78*   HGB 11.3*   HCT 35.4*      MCV 94   MCH 29.9   MCHC 31.9*       Diagnostic Results:  Labs: Reviewed    Assessment/Plan:     * Postoperative abscess  - VSS, afebrile  - CT scan concerning for pelvic abscess, final report pending  - WBC 14 >> 6  - Will hold PO Augmentin/Flagyl, and start IV Zosyn/Flagyl  - Aerobe/Anaerobe Cx from vaginal discharge on 3/29: Gram Neg Rods  - Urine Cx from 3/29 negative  - Blood Cx from 3/29 prelim negative  - Obtained repeat Aerobe/Anaerobe Cx from vaginal discharge  - Motrin/Norco for pain PRN  - Zofran PRN  - Consider IR consult in AM for drainage  - Regular diet >> NPO at midnight  - TEDs/SCDs for DVT ppx    Hyperlipidemia  - continue home Pravastatin 40mg daily    Ulcerative colitis  - continue home Lialda 2.4mg daily    Gastroesophageal reflux disease  - Home med Prilosec not on formulary  - Protonix 40mg daily    HTN (hypertension)  - will monitor BP q 4 hours  - Continue home Lisinopril 10mg daily  - BP: (121-148)/(64-82) 121/64           Kimberly Kern MD  Obstetrics & Gynecology  [facility]

## 2019-04-02 NOTE — CONSULTS
Gastroenterology Consult    4/2/2019 11:46 AM    Patient Name: Jenny Caro  MRN: [id number]  Admission Date: 4/1/2019  Hospital Length of Stay: 0 days  Code Status: Full Code   Primary Care Physician: Eliceo Beatty MD  Principal Problem:Postoperative abscess  Consulting Physician: Inpatient consult to Gastroenterology  Consult performed by: Danay Capellan MD  Consult ordered by: Fatou Marino MD      Reason for consultation: ulcerative colitis    HPI:  Jenny Caro is a 63 y.o. female with a history of HTN, HLD, GERD, arthritis and ulcerative coltiis who presented with fever and was found to have a pelvic abscess.  She had undergone recent robotic hysterectomy, sacro-colpopexy, and posterior colporraphy with mesh on 3/17 for uterine prolapse.  She did well post operatively and had not had any issues.  She was seen in clinic and reported fever and vaginal discharge.  She was initially treated with Augmentin for vaginal cuff cellulitis after being seen in the ER 2 days prior to her clinic visit.  She denies any abdominal or pelvic pain, loss of appetite, N/V, constipation or GI bleeding.  She has some diarrhea which she attributes to the Augmentin she was given.  The stool is looser than usual.  She was diagnosed with ulcerative colitis in 2007.  She has regular follow up with her gastroenterologist at [facility] and has had colonoscopies every 4 years or so since her diagnosis.  She reports that she only ever had rectal bleeding at the time of her initial diagnosis.  She was put on lialda 2.4 gm daily and has not had any issues since then.  She denies any history of stricture, fistula, perianal disease or small bowel involvement.  She has never had any precancerous polyps in the colon and has no family history of colon cancer or IBD.  The patient reports that the gyn examined her and saw leakage of fluid.    Past Medical History:   Diagnosis Date    Abnormal Pap smear of cervix 2009    LEEP 2009,  mild dysplasia normal paps since    Arthritis     ankles  born with club feet    GERD (gastroesophageal reflux disease)     HPV in female     Hyperlipidemia     Hypertension     Ulcerative colitis        Past Surgical History:   Procedure Laterality Date    CERVICAL BIOPSY  W/ LOOP ELECTRODE EXCISION  2009    COLPORRHAPHY, POSTERIOR N/A 3/13/2019    [facility]formed [facility] at [facility]    CYSTOSCOPY N/A 3/13/2019    [facility] at [facility]    DILATION AND CURETTAGE, UTERUS  11/20/2018    [facility]formed by [facility]vind Russell [facility] at [facility]    gastric sleeve  08/2015    HYSTEROSCOPY; TRUCLEAR RESECTION OF UTERINE POLYP N/A 11/20/2018    [facility]formed by [facility] at [facility]    LAPAROSCOPIC GASTRIC BANDING  2006    ROBOTIC HYSTERECTOMY N/A 3/13/2019    [facility]formed [facility] at [facility]    ROBOTIC SACROCOLPOPEXY, ABDOMEN N/A 3/13/2019    Performed by [facility] at [facility]    ROBOTIC SALPINGO-OOPHORECTOMY Bilateral 3/13/2019    Performed by [facility] Russell [facility] at [facility]       Social History     Socioeconomic History    Marital status:      Spouse name: None    Number of children: None    Years of education: None    Highest education level: None   Occupational History    Occupation: retired   Social Needs    Financial resource strain: None    Food insecurity:     Worry: None     Inability: None    Transportation needs:     Medical: None     Non-medical: None   Tobacco Use    Smoking status: Never Smoker    Smokeless tobacco: Never Used   Substance and Sexual Activity    Alcohol use: No    Drug use: No    Sexual activity: Yes     Partners: Male     Birth control/protection: Post-menopausal   Lifestyle    Physical activity:     Days per week: None     Minutes per session: None    Stress: None   Relationships    Social connections:     Talks on phone: None     Gets together: None     Attends Protestant service: None     Active member of  club or organization: None     Attends meetings of clubs or organizations: None     Relationship status: None    Intimate partner violence:     Fear of current or ex partner: None     Emotionally abused: None     Physically abused: None     Forced sexual activity: None   Other Topics Concern    None   Social History Narrative    None       Family History   Problem Relation Age of Onset    Diabetes Mother     Heart disease Father     Stroke Sister     Diabetes Sister     Breast cancer Neg Hx          Review of patient's allergies indicates:   Allergen Reactions    Ciprofloxacin Swelling    Bactrim [sulfamethoxazole-trimethoprim] Rash         Current Facility-Administered Medications:     acetaminophen tablet 650 mg, 650 mg, Oral, Q6H PRN, Kimberly Kern MD    diphenhydrAMINE capsule 50 mg, 50 mg, Oral, Nightly PRN, Kimberly Kern MD    HYDROcodone-acetaminophen  mg per tablet 1 tablet, 1 tablet, Oral, Q4H PRN, Kimberly Kern MD    HYDROcodone-acetaminophen 5-325 mg per tablet 1 tablet, 1 tablet, Oral, Q4H PRN, Kimberly Kern MD    ibuprofen tablet 600 mg, 600 mg, Oral, Q6H PRN, Kimberly Kern MD    lisinopril tablet 10 mg, 10 mg, Oral, Daily, Kimberly Kern MD, 10 mg at 04/02/19 0925    mesalamine (LIALDA) EC tablet 1.2 g, 2.4 g, Oral, Daily with breakfast, Kimberly Kern MD    metronidazole IVPB 500 mg, 500 mg, Intravenous, Q8H, Kimberly Kern MD, Last Rate: 100 mL/hr at 04/02/19 0636, 500 mg at 04/02/19 0636    ondansetron disintegrating tablet 8 mg, 8 mg, Oral, Q8H PRN, Kimberly Kern MD    pantoprazole EC tablet 40 mg, 40 mg, Oral, Daily, Kimberly Kern MD, 40 mg at 04/02/19 0925    piperacillin-tazobactam 4.5 g in dextrose 5 % 100 mL IVPB (ready to mix system), 4.5 g, Intravenous, Q8H, Kimberly Kern MD, Last Rate: 200 mL/hr at 04/02/19 0600, 4.5 g at 04/02/19 0600    pravastatin tablet 40 mg, 40 mg, Oral, Daily, Kimberly OLMEDO  "MD Concha    promethazine (PHENERGAN) 12.5 mg in dextrose 5 % 50 mL IVPB, 12.5 mg, Intravenous, Q6H PRN, Kimberly Kern MD    sodium chloride 0.9% flush 10 mL, 10 mL, Intravenous, PRN, Kimberly Kern MD    Review of Systems   Constitutional: Positive for fever. Negative for chills and weight loss.   HENT: Negative.    Eyes: Negative.    Respiratory: Negative.    Cardiovascular: Negative.    Gastrointestinal: Positive for diarrhea. Negative for abdominal pain, blood in stool, constipation, heartburn, melena, nausea and vomiting.   Genitourinary: Negative.    Musculoskeletal: Negative.    Skin: Negative.    Neurological: Negative.    Endo/Heme/Allergies: Negative.      /75   Pulse 78   Temp 98.7 °F (37.1 °C)   Resp 18   Ht 5' 3" (1.6 m)   Wt 109.8 kg (242 lb 1 oz)   SpO2 96%   Breastfeeding? No   BMI 42.88 kg/m²   Physical Exam   Constitutional: She is oriented to person, place, and time. She appears well-developed and well-nourished. No distress.   obese   HENT:   Head: Normocephalic and atraumatic.   Mouth/Throat: Oropharynx is clear and moist.   Eyes: Pupils are equal, round, and reactive to light. EOM are normal. No scleral icterus.   Cardiovascular: Normal rate and regular rhythm.   No murmur heard.  Pulmonary/Chest: Effort normal and breath sounds normal. She has no wheezes.   Abdominal: Soft. Bowel sounds are normal. She exhibits no distension. There is no tenderness. There is no guarding.   Musculoskeletal: Normal range of motion. She exhibits no edema.   Neurological: She is alert and oriented to person, place, and time. No sensory deficit.   Skin: Skin is warm and dry. No rash noted.   Vitals reviewed.    Labs:  Lab Results   Component Value Date/Time    WBC 6.77 04/01/2019 10:54 PM    HGB 11.3 (L) 04/01/2019 10:54 PM    HCT 35.4 (L) 04/01/2019 10:54 PM     04/01/2019 10:54 PM    MCV 94 04/01/2019 10:54 PM     04/01/2019 10:54 PM    K 4.9 04/01/2019 10:54 PM     " 04/01/2019 10:54 PM    CO2 26 04/01/2019 10:54 PM    BUN 19 04/01/2019 10:54 PM    CREATININE 0.8 04/01/2019 10:54 PM    GLU 86 04/01/2019 10:54 PM    CALCIUM 9.5 04/01/2019 10:54 PM    MG 2.3 03/29/2019 05:50 PM    PHOS 3.0 03/29/2019 05:50 PM   ]  Lab Results   Component Value Date/Time    PROT 7.4 03/29/2019 05:50 PM    ALBUMIN 3.1 (L) 03/29/2019 05:50 PM    BILITOT 0.5 03/29/2019 05:50 PM    AST 10 03/29/2019 05:50 PM    ALT 13 03/29/2019 05:50 PM    ALKPHOS 86 03/29/2019 05:50 PM   ]    Imaging and Procedures:  I personally reviewed the imaging/procedures below.  CT A/P 4/1/19:  4.6 cm mixed density collection concerning for abscess formation within the right adnexa abutting the sigmoid colon with colonic involvement not entirely excluded.  There is surrounding mesenteric haziness in fat stranding.  Large hiatal hernia.  Cholelithiasis without evidence of acute cholecystitis.  Dextroscoliosis.    Assessment:  Jenny Caro is a 63 y.o. female with a history of HTN, HLD, GERD, arthritis and ulcerative coltiis who presented with fever and vaginal discharge and was found to have a pelvic abscess after a recent robotic hysterectomy, sacro-colpopexy, and posterior colporraphy with mesh.       Plan:  - CT scan reviewed- fluid collection is in close proximity to the sigmoid colon, but colon proximal and distal to this area does not appear thickened and patient is asymptomatic aside from looser stools since starting Augmentin, and this is a common side effect  - would continue lialda 2.4 gm daily  - per discussion with patient about her IBD history, it seems that she has had a very mild IBD which is managed with low dose mesalamine - without any additional GI symptoms like rectal bleeding or significant diarrhea with nocturnal bowel movements, I doubt that there is any complication like a perforation or fistula  - no plans for flex sig/colonoscopy unless requested by GYN for reassurance that there is no colonic  involvement  - defer to GYN/radiology regarding need for percutaneous drain  - diet as tolerated  - could take probiotics or use Activia yogurt to help repopulate normal gut frank to help with antibiotic associated diarrhea    Danay Capellan MD

## 2019-04-02 NOTE — PROGRESS NOTES
[facility]  Obstetrics & Gynecology  Progress Note    Patient Name: Jenny Caro  MRN: [id number]  Admission Date: 2019  Primary Care Provider: Elicoe Beatty MD  Principal Problem: Postoperative abscess    Subjective:     HPI:  Patient is a 63 y.o. , POD#19 from a robotic assisted hysterectomy/sacro-colpopexy/posterior colporraphy who presents as a direct admit for management of postop abscess.  Patient seen in ED on 3/29 with subjective fevers/chills, and noted to have tenderness and erythema at the vaginal cuff. She was subsequently diagnosed with vaginal cuff cellulitis and prescribed Augmentin/Flagyl.  She was seen for follow up today in urogyn clinic, and noted to have green-gray vaginal discharge on exam. CT abd/pelvis was obtained, and was concerning for a pelvic abscess.    Patient reports last fever was 3/29. She currently denies any complaints, other than a scant vaginal discharge. No abdominal/pelvic pain, vaginal bleeding, dysuria, nausea, or vomiting. States she has been tolerating a regular diet at home without difficulty. Has not required pain medications for postop pain/cramping.     Of note, patient's PMH is significant for ulcerative colitis, HTN, GERD, and Hyperlipidemia.       Interval History: Admitted overnight for possible postoperative abscess.  Started on IV antibiotics and recultured.  CT results pending. Patient tolerated dinner last night, no N/V. She remains afebrile. She denies pain.     Scheduled Meds:   lisinopril  10 mg Oral Daily    mesalamine  2.4 g Oral Daily with breakfast    metronidazole  500 mg Intravenous Q8H    pantoprazole  40 mg Oral Daily    piperacillin-tazobactam (ZOSYN) IVPB  4.5 g Intravenous Q8H    pravastatin  40 mg Oral Daily     Continuous Infusions:  PRN Meds:acetaminophen, diphenhydrAMINE, HYDROcodone-acetaminophen, HYDROcodone-acetaminophen, ibuprofen, ondansetron, promethazine (PHENERGAN) IVPB, sodium chloride  0.9%    Review of patient's allergies indicates:   Allergen Reactions    Ciprofloxacin Swelling    Bactrim [sulfamethoxazole-trimethoprim] Rash       Objective:     Vital Signs (Most Recent):  Temp: 98 °F (36.7 °C) (04/02/19 0431)  Pulse: 75 (04/02/19 0431)  Resp: 20 (04/02/19 0431)  BP: 139/63 (04/02/19 0431)  SpO2: 100 % (04/02/19 0431) Vital Signs (24h Range):  Temp:  [98 °F (36.7 °C)-98.2 °F (36.8 °C)] 98 °F (36.7 °C)  Pulse:  [73-92] 75  Resp:  [18-20] 20  SpO2:  [94 %-100 %] 100 %  BP: (121-148)/(63-82) 139/63     Weight: 109.8 kg (242 lb 1 oz)  Body mass index is 42.88 kg/m².  No LMP recorded. Patient is postmenopausal.    I&O (Last 24H):    Intake/Output Summary (Last 24 hours) at 4/2/2019 0609  Last data filed at 4/2/2019 0000  Gross per 24 hour   Intake 600 ml   Output --   Net 600 ml       Physical Exam:   Constitutional: She is oriented to person, place, and time. She appears well-developed and well-nourished. No distress.    HENT:   Head: Normocephalic and atraumatic.    Eyes: Conjunctivae and EOM are normal.    Neck: Normal range of motion. Neck supple. No thyromegaly present.    Cardiovascular: Normal rate, regular rhythm and normal heart sounds.     Pulmonary/Chest: Effort normal and breath sounds normal. No respiratory distress.        Abdominal: Soft. Bowel sounds are normal. She exhibits abdominal incision (well healed port site incisions, c/d/i). She exhibits no distension. There is no tenderness.     Genitourinary: Vaginal discharge (minimal) found.           Musculoskeletal: She exhibits no edema or tenderness.       Neurological: She is alert and oriented to person, place, and time.    Skin: Skin is warm and dry. No rash noted.    Psychiatric: She has a normal mood and affect. Her behavior is normal.       Laboratory:  CBC:   Recent Labs   Lab 04/01/19  2254   WBC 6.77   RBC 3.78*   HGB 11.3*   HCT 35.4*      MCV 94   MCH 29.9   MCHC 31.9*     Recent Labs   Lab 03/29/19  3365  04/01/19  2254    140   K 3.9 4.9    106   CO2 26 26   BUN 14 19   CREATININE 0.8 0.8   * 86   MG 2.3  --    PHOS 3.0  --          Diagnostic Results:  CT: Reviewed, radiology read pending    Assessment/Plan:     * Postoperative abscess  - VSS, afebrile  - CT scan concerning for pelvic abscess, final report pending  - WBC 14 >> 6  - Will hold PO Augmentin/Flagyl, and start IV Zosyn/Flagyl  - Aerobe/Anaerobe Cx from vaginal discharge on 3/29: Gram Neg Rods  - Urine Cx from 3/29 negative  - Blood Cx from 3/29 prelim negative  - Obtained repeat Aerobe/Anaerobe Cx from vaginal discharge  - Motrin/Norco for pain PRN  - Zofran PRN  - Consider IR consult in AM for drainage  - Regular diet >> NPO at midnight  - TEDs/SCDs for DVT ppx    Hyperlipidemia  - continue home Pravastatin 40mg daily    Ulcerative colitis  - continue home Lialda 2.4mg daily    Gastroesophageal reflux disease  - Home med Prilosec not on formulary  - Protonix 40mg daily    HTN (hypertension)  - will monitor BP q 4 hours  - Continue home Lisinopril 10mg daily  - BP: (121-148)/(64-82) 121/64           Hodan Uribe MD  Obstetrics & Gynecology  [facility]

## 2019-04-02 NOTE — ASSESSMENT & PLAN NOTE
- will monitor BP q 4 hours  - Continue home Lisinopril 10mg daily  - BP: (121-148)/(64-82) 121/64

## 2019-04-02 NOTE — PROGRESS NOTES
Patient seen on afternoon rounds.  She reports feeling well. She denies pain and reports continued vaginal drainage.  She remains afebrile.     Temp:  [98 °F (36.7 °C)-98.7 °F (37.1 °C)] 98.7 °F (37.1 °C)  Pulse:  [73-92] 78  Resp:  [18-20] 18  SpO2:  [94 %-100 %] 96 %  BP: (121-139)/(63-98) 139/75     Recent Labs   Lab 03/29/19  1750 04/01/19  2254   WBC 14.17* 6.77   HGB 12.1 11.3*   HCT 37.6 35.4*   MCV 93 94    341          Discussed imaging with CRS and IR, per IR not ideal candidate for IR drain  Given continued improvement clinically, improvement in WBC, and spontaneous drainage, will hold on IR drain  Continue IV Vanc/Zosyn  Okay to have regular diet  GI saw patient, do not feel UC is contributing to current status  ID to see patient tmrw    Hodan Uribe MD  Ob/Gyn PGY-3

## 2019-04-02 NOTE — NURSING
No significant events this shift. Remains free from fall, injury, and skin breakdown. Ambulates independently to bathroom. VSS stable on RA and afebrile. Positions self independently. No c/o pain at this shift. Neuro checks WDL. SCDs maintained.Tolerating ordered diet. IV site WNL. Plan of care reviewed with patient and all questions answered. Bed low, locked w/ call light in reach. Call light within reach. Purposeful rounding performed. No other complaints at this time.  at bedside.

## 2019-04-02 NOTE — SUBJECTIVE & OBJECTIVE
OB History    Para Term  AB Living   2 2 2 0 0 2   SAB TAB Ectopic Multiple Live Births   0 0 0 0 2      # Outcome Date GA Lbr Guero/2nd Weight Sex Delivery Anes PTL Lv   2 Term      Vag-Spont      1 Term      Vag-Spont        Past Medical History:   Diagnosis Date    Abnormal Pap smear of cervix     LEEP , mild dysplasia normal paps since    Arthritis     ankles  born with club feet    GERD (gastroesophageal reflux disease)     HPV in female     Hyperlipidemia     Hypertension     Ulcerative colitis      Past Surgical History:   Procedure Laterality Date    CERVICAL BIOPSY  W/ LOOP ELECTRODE EXCISION      COLPORRHAPHY, POSTERIOR N/A 3/13/2019    [facility]    CYSTOSCOPY N/A 3/13/2019    [facility]    DILATION AND CURETTAGE, UTERUS  2018    [facility] at [facility]    gastric sleeve  2015    HYSTEROSCOPY; TRUCLEAR RESECTION OF UTERINE POLYP N/A 2018    [facility] at [facility]    LAPAROSCOPIC GASTRIC BANDING  2006    ROBOTIC HYSTERECTOMY N/A 3/13/2019    [facility] at [facility]    ROBOTIC SACROCOLPOPEXY, ABDOMEN N/A 3/13/2019    [facility] at [facility]    ROBOTIC SALPINGO-OOPHORECTOMY Bilateral 3/13/2019    Performed by [facility] MD at [facility] OR       PTA Medications   Medication Sig    acetaminophen (TYLENOL) 650 MG TbSR Take 650 mg by mouth as needed.    amoxicillin-clavulanate 875-125mg (AUGMENTIN) 875-125 mg per tablet Take 1 tablet by mouth 2 (two) times daily.    ascorbic acid, vitamin C, (VITAMIN C) 100 MG tablet Take 100 mg by mouth once daily.    aspirin (ECOTRIN) 81 MG EC tablet Take 81 mg by mouth once daily.    BETA-CAROTENE,A, W-C & E/MIN (OCUVITE ORAL) Take by mouth.    biotin 1 mg Cap Take by mouth.    cetirizine (ZYRTEC) 10 MG tablet Take 10 mg by mouth once daily.     diphenhydrAMINE (BENADRYL) 50 MG capsule Take 50 mg by mouth nightly as needed for Itching.    ibuprofen (ADVIL,MOTRIN) 600 MG tablet Take 1 tablet (600 mg total) by mouth every 6 (six) hours as needed for Pain.    krill oil 500 mg Cap Take by mouth once daily.    LIALDA 1.2 gram TbEC 2.4 g daily with breakfast.     lisinopril 10 MG tablet Take 10 mg by mouth once daily.    loratadine (CLARITIN) 10 mg tablet Take 10 mg by mouth once daily.    metroNIDAZOLE (FLAGYL) 500 MG tablet TK ONE T PO BID FOR 7 DAYS    omeprazole (PRILOSEC) 20 MG capsule 20 mg once daily.     pravastatin (PRAVACHOL) 40 MG tablet     simethicone (GAS-X ORAL) Take by mouth once daily.        Review of patient's allergies indicates:   Allergen Reactions    Ciprofloxacin Swelling    Bactrim [sulfamethoxazole-trimethoprim] Rash        Family History     Problem Relation (Age of Onset)    Diabetes Mother, Sister    Heart disease Father    Stroke Sister        Tobacco Use    Smoking status: Never Smoker    Smokeless tobacco: Never Used   Substance and Sexual Activity    Alcohol use: No    Drug use: No    Sexual activity: Yes     Partners: Male     Birth control/protection: Post-menopausal     Review of Systems   Constitutional: Negative for chills and fever.   Respiratory: Negative for cough and shortness of breath.    Cardiovascular: Negative for chest pain.   Gastrointestinal: Negative for abdominal pain, constipation, nausea and vomiting.   Genitourinary: Positive for vaginal discharge. Negative for dysuria, flank pain, hematuria, pelvic pain, vaginal bleeding and urinary incontinence.   Musculoskeletal: Negative for back pain and myalgias.   Neurological: Negative for syncope and headaches.      Objective:     Vital Signs (Most Recent):  Temp: 98.2 °F (36.8 °C) (04/02/19 0020)  Pulse: 73 (04/02/19 0020)  Resp: 18 (04/02/19 0020)  BP: 121/64 (04/02/19 0020)  SpO2: 96 % (04/02/19 0020) Vital Signs (24h Range):  Temp:  [98.1 °F (36.7  °C)-98.2 °F (36.8 °C)] 98.2 °F (36.8 °C)  Pulse:  [73-92] 73  Resp:  [18] 18  SpO2:  [94 %-96 %] 96 %  BP: (121-148)/(64-82) 121/64     Weight: 109.8 kg (242 lb 1 oz)  Body mass index is 42.88 kg/m².    No LMP recorded. Patient is postmenopausal.    Physical Exam:   Constitutional: She is oriented to person, place, and time. She appears well-developed and well-nourished. No distress.    HENT:   Head: Normocephalic and atraumatic.    Eyes: Conjunctivae and EOM are normal.    Neck: Normal range of motion. Neck supple. No thyromegaly present.    Cardiovascular: Normal rate and regular rhythm.     Pulmonary/Chest: Effort normal. No respiratory distress.        Abdominal: Soft. She exhibits no distension. There is no tenderness.     Genitourinary: Vaginal discharge (minimal) found.           Musculoskeletal: She exhibits no edema or tenderness.       Neurological: She is alert and oriented to person, place, and time.    Skin: Skin is warm and dry. No rash noted.    Psychiatric: She has a normal mood and affect. Her behavior is normal.       Laboratory:  BMP:   Recent Labs   Lab 04/01/19  2254   GLU 86      K 4.9      CO2 26   BUN 19   CREATININE 0.8   CALCIUM 9.5     CBC:   Recent Labs   Lab 04/01/19  2254   WBC 6.77   RBC 3.78*   HGB 11.3*   HCT 35.4*      MCV 94   MCH 29.9   MCHC 31.9*       Diagnostic Results:  Labs: Reviewed

## 2019-04-02 NOTE — HPI
Patient is a 63 y.o. , POD#19 from a robotic assisted hysterectomy/sacro-colpopexy/posterior colporraphy who presents as a direct admit for management of postop abscess.  Patient seen in ED on 3/29 with subjective fevers/chills, and noted to have tenderness and erythema at the vaginal cuff. She was subsequently diagnosed with vaginal cuff cellulitis and prescribed Augmentin/Flagyl.  She was seen for follow up today in urogyn clinic, and noted to have green-gray vaginal discharge on exam. CT abd/pelvis was obtained, and was concerning for a pelvic abscess.    Patient reports last fever was 3/29. She currently denies any complaints, other than a scant vaginal discharge. No abdominal/pelvic pain, vaginal bleeding, dysuria, nausea, or vomiting. States she has been tolerating a regular diet at home without difficulty. Has not required pain medications for postop pain/cramping.     Of note, patient's PMH is significant for ulcerative colitis, HTN, GERD, and Hyperlipidemia.

## 2019-04-02 NOTE — PROGRESS NOTES
IR contacted about possible drainage of pelvic collection. CT reviewed and case discussed with GYN. Patient would require transgluteal drainage as there is no safe window from anterior approach.There is a narrow window between the gluteal vessels and the sacrum with risk of injury to the gluteal artery. Patient has had some drainage transvaginal and WBC has trended down from 14 K 4 days ago to 6.7 K yesterday. Given the risk of injury from transgluteal approach, would continue to manage conservatively. If collection enlarges or patient's condition worsens, would be happy to reconsider drainage at that time. Please contact IR with any questions or concerns.    Hussein Albarado MD  Department of Radiology  Pager: [phone]

## 2019-04-02 NOTE — HOSPITAL COURSE
04/02/2019 Admitted overnight for possible postoperative abscess.  Started on IV antibiotics and recultured.  CT results pending. Patient tolerated dinner last night, no N/V. She remains afebrile. She denies pain.   04/03/2019

## 2019-04-02 NOTE — SUBJECTIVE & OBJECTIVE
Interval History: Admitted overnight for possible postoperative abscess.  Started on IV antibiotics and recultured.  CT results pending. Patient tolerated dinner last night, no N/V. She remains afebrile. She denies pain.     Scheduled Meds:   lisinopril  10 mg Oral Daily    mesalamine  2.4 g Oral Daily with breakfast    metronidazole  500 mg Intravenous Q8H    pantoprazole  40 mg Oral Daily    piperacillin-tazobactam (ZOSYN) IVPB  4.5 g Intravenous Q8H    pravastatin  40 mg Oral Daily     Continuous Infusions:  PRN Meds:acetaminophen, diphenhydrAMINE, HYDROcodone-acetaminophen, HYDROcodone-acetaminophen, ibuprofen, ondansetron, promethazine (PHENERGAN) IVPB, sodium chloride 0.9%    Review of patient's allergies indicates:   Allergen Reactions    Ciprofloxacin Swelling    Bactrim [sulfamethoxazole-trimethoprim] Rash       Objective:     Vital Signs (Most Recent):  Temp: 98 °F (36.7 °C) (04/02/19 0431)  Pulse: 75 (04/02/19 0431)  Resp: 20 (04/02/19 0431)  BP: 139/63 (04/02/19 0431)  SpO2: 100 % (04/02/19 0431) Vital Signs (24h Range):  Temp:  [98 °F (36.7 °C)-98.2 °F (36.8 °C)] 98 °F (36.7 °C)  Pulse:  [73-92] 75  Resp:  [18-20] 20  SpO2:  [94 %-100 %] 100 %  BP: (121-148)/(63-82) 139/63     Weight: 109.8 kg (242 lb 1 oz)  Body mass index is 42.88 kg/m².  No LMP recorded. Patient is postmenopausal.    I&O (Last 24H):    Intake/Output Summary (Last 24 hours) at 4/2/2019 0609  Last data filed at 4/2/2019 0000  Gross per 24 hour   Intake 600 ml   Output --   Net 600 ml       Physical Exam:   Constitutional: She is oriented to person, place, and time. She appears well-developed and well-nourished. No distress.    HENT:   Head: Normocephalic and atraumatic.    Eyes: Conjunctivae and EOM are normal.    Neck: Normal range of motion. Neck supple. No thyromegaly present.    Cardiovascular: Normal rate, regular rhythm and normal heart sounds.     Pulmonary/Chest: Effort normal and breath sounds normal. No respiratory  distress.        Abdominal: Soft. Bowel sounds are normal. She exhibits abdominal incision (well healed port site incisions, c/d/i). She exhibits no distension. There is no tenderness.     Genitourinary: Vaginal discharge (minimal) found.           Musculoskeletal: She exhibits no edema or tenderness.       Neurological: She is alert and oriented to person, place, and time.    Skin: Skin is warm and dry. No rash noted.    Psychiatric: She has a normal mood and affect. Her behavior is normal.       Laboratory:  CBC:   Recent Labs   Lab 04/01/19  2254   WBC 6.77   RBC 3.78*   HGB 11.3*   HCT 35.4*      MCV 94   MCH 29.9   MCHC 31.9*     Recent Labs   Lab 03/29/19  1750 04/01/19  2254    140   K 3.9 4.9    106   CO2 26 26   BUN 14 19   CREATININE 0.8 0.8   * 86   MG 2.3  --    PHOS 3.0  --          Diagnostic Results:  CT: Reviewed, radiology read pending

## 2019-04-02 NOTE — PLAN OF CARE
LMSW met with patient at the bedside.     Patient is alert and oriented with no communication barriers.     Prior to admission patient was independent. Patient denies the use of HH or DME.     Patients PCP is correct on the face sheet. Patient choice pharmacy is [facility] on  [address].     Patient denies a history of substance use or mental illness.     Patients family will transport her home at discharge.      No CM needs identified at this time.     CM team will continue to follow.       04/02/19 1420   Discharge Assessment   Assessment Type Discharge Planning Assessment   Confirmed/corrected address and phone number on facesheet? Yes   Assessment information obtained from? Patient   Communicated expected length of stay with patient/caregiver no   Prior to hospitilization cognitive status: Alert/Oriented   Prior to hospitalization functional status: Independent   Current cognitive status: Alert/Oriented   Current Functional Status: Independent   Lives With spouse   Able to Return to Prior Arrangements yes   Is patient able to care for self after discharge? Yes   Patient's perception of discharge disposition home or selfcare   Readmission Within the Last 30 Days no previous admission in last 30 days   Patient currently being followed by outpatient case management? No   Patient currently receives any other outside agency services? No   Equipment Currently Used at Home none   Do you have any problems affording any of your prescribed medications? No   Is the patient taking medications as prescribed? yes   Does the patient have transportation home? Yes   Transportation Anticipated family or friend will provide   Does the patient receive services at the [facility]? No   Discharge Plan A Home   DME Needed Upon Discharge  none   Patient/Family in Agreement with Plan yes

## 2019-04-02 NOTE — TELEPHONE ENCOUNTER
Called patient to follow up with her. Doing well no temps. NPO waiting for IR drainage yoandy. Spoke with Dr. Hussein Dutton (radiology ) who recommended waiting on placing a drain given the size, location, current drainage, clinical improvement. His extension is [phone].

## 2019-04-03 VITALS
BODY MASS INDEX: 42.89 KG/M2 | DIASTOLIC BLOOD PRESSURE: 75 MMHG | HEIGHT: 63 IN | RESPIRATION RATE: 16 BRPM | SYSTOLIC BLOOD PRESSURE: 123 MMHG | WEIGHT: 242.06 LBS | TEMPERATURE: 99 F | HEART RATE: 86 BPM | OXYGEN SATURATION: 94 %

## 2019-04-03 LAB
ANION GAP SERPL CALC-SCNC: 8 MMOL/L (ref 8–16)
BACTERIA BLD CULT: NORMAL
BACTERIA BLD CULT: NORMAL
BACTERIA SPEC ANAEROBE CULT: NORMAL
BACTERIA SPEC ANAEROBE CULT: NORMAL
BUN SERPL-MCNC: 17 MG/DL (ref 8–23)
CALCIUM SERPL-MCNC: 9.3 MG/DL (ref 8.7–10.5)
CHLORIDE SERPL-SCNC: 106 MMOL/L (ref 95–110)
CO2 SERPL-SCNC: 27 MMOL/L (ref 23–29)
CREAT SERPL-MCNC: 0.8 MG/DL (ref 0.5–1.4)
EST. GFR  (AFRICAN AMERICAN): >60 ML/MIN/1.73 M^2
EST. GFR  (NON AFRICAN AMERICAN): >60 ML/MIN/1.73 M^2
GLUCOSE SERPL-MCNC: 94 MG/DL (ref 70–110)
POTASSIUM SERPL-SCNC: 4.4 MMOL/L (ref 3.5–5.1)
SODIUM SERPL-SCNC: 141 MMOL/L (ref 136–145)

## 2019-04-03 PROCEDURE — 99223 1ST HOSP IP/OBS HIGH 75: CPT | Mod: ,,, | Performed by: INTERNAL MEDICINE

## 2019-04-03 PROCEDURE — 63600175 PHARM REV CODE 636 W HCPCS: Performed by: OBSTETRICS & GYNECOLOGY

## 2019-04-03 PROCEDURE — 99223 PR INITIAL HOSPITAL CARE,LEVL III: ICD-10-PCS | Mod: ,,, | Performed by: INTERNAL MEDICINE

## 2019-04-03 PROCEDURE — 80048 BASIC METABOLIC PNL TOTAL CA: CPT

## 2019-04-03 PROCEDURE — 25000003 PHARM REV CODE 250: Performed by: OBSTETRICS & GYNECOLOGY

## 2019-04-03 PROCEDURE — S0030 INJECTION, METRONIDAZOLE: HCPCS | Performed by: OBSTETRICS & GYNECOLOGY

## 2019-04-03 PROCEDURE — 36415 COLL VENOUS BLD VENIPUNCTURE: CPT

## 2019-04-03 PROCEDURE — 25000003 PHARM REV CODE 250: Performed by: STUDENT IN AN ORGANIZED HEALTH CARE EDUCATION/TRAINING PROGRAM

## 2019-04-03 RX ORDER — AMOXICILLIN AND CLAVULANATE POTASSIUM 875; 125 MG/1; MG/1
1 TABLET, FILM COATED ORAL 2 TIMES DAILY
Qty: 14 TABLET | Refills: 0 | OUTPATIENT
Start: 2019-04-03 | End: 2019-04-09

## 2019-04-03 RX ORDER — AMOXICILLIN AND CLAVULANATE POTASSIUM 875; 125 MG/1; MG/1
1 TABLET, FILM COATED ORAL 2 TIMES DAILY
Qty: 20 TABLET | Refills: 0 | Status: SHIPPED | OUTPATIENT
Start: 2019-04-03 | End: 2019-04-13

## 2019-04-03 RX ADMIN — IBUPROFEN 600 MG: 600 TABLET ORAL at 02:04

## 2019-04-03 RX ADMIN — PIPERACILLIN AND TAZOBACTAM 4.5 G: 4; .5 INJECTION, POWDER, LYOPHILIZED, FOR SOLUTION INTRAVENOUS; PARENTERAL at 06:04

## 2019-04-03 RX ADMIN — METRONIDAZOLE 500 MG: 500 INJECTION, SOLUTION INTRAVENOUS at 06:04

## 2019-04-03 RX ADMIN — CETIRIZINE HYDROCHLORIDE 10 MG: 10 TABLET, FILM COATED ORAL at 08:04

## 2019-04-03 RX ADMIN — LISINOPRIL 10 MG: 10 TABLET ORAL at 08:04

## 2019-04-03 RX ADMIN — MESALAMINE 2.4 G: 1.2 TABLET, DELAYED RELEASE ORAL at 08:04

## 2019-04-03 RX ADMIN — PIPERACILLIN AND TAZOBACTAM 4.5 G: 4; .5 INJECTION, POWDER, LYOPHILIZED, FOR SOLUTION INTRAVENOUS; PARENTERAL at 02:04

## 2019-04-03 RX ADMIN — PANTOPRAZOLE SODIUM 40 MG: 40 TABLET, DELAYED RELEASE ORAL at 08:04

## 2019-04-03 RX ADMIN — METRONIDAZOLE 500 MG: 500 INJECTION, SOLUTION INTRAVENOUS at 04:04

## 2019-04-03 NOTE — SUBJECTIVE & OBJECTIVE
Interval History:   Patient tolerated dinner last night, no N/V. She remains afebrile. She denies pain. She reports continued brown vaginal discharge. She denies chest pain or SOB.     Scheduled Meds:   cetirizine  10 mg Oral Daily    lisinopril  10 mg Oral Daily    mesalamine  2.4 g Oral Daily with breakfast    metronidazole  500 mg Intravenous Q8H    pantoprazole  40 mg Oral Daily    piperacillin-tazobactam (ZOSYN) IVPB  4.5 g Intravenous Q8H    pravastatin  40 mg Oral Daily     Continuous Infusions:  PRN Meds:acetaminophen, diphenhydrAMINE, HYDROcodone-acetaminophen, HYDROcodone-acetaminophen, ibuprofen, ondansetron, promethazine (PHENERGAN) IVPB, sodium chloride 0.9%    Review of patient's allergies indicates:   Allergen Reactions    Ciprofloxacin Swelling    Bactrim [sulfamethoxazole-trimethoprim] Rash       Objective:     Vital Signs (Most Recent):  Temp: 97.9 °F (36.6 °C) (04/03/19 0501)  Pulse: 68 (04/03/19 0501)  Resp: 20 (04/03/19 0501)  BP: (!) 148/82 (04/03/19 0501)  SpO2: 99 % (04/03/19 0501) Vital Signs (24h Range):  Temp:  [97.9 °F (36.6 °C)-98.7 °F (37.1 °C)] 97.9 °F (36.6 °C)  Pulse:  [] 68  Resp:  [18-20] 20  SpO2:  [95 %-99 %] 99 %  BP: (116-148)/(65-98) 148/82     Weight: 109.8 kg (242 lb 1 oz)  Body mass index is 42.88 kg/m².  No LMP recorded. Patient is postmenopausal.    I&O (Last 24H):    Intake/Output Summary (Last 24 hours) at 4/3/2019 0620  Last data filed at 4/2/2019 2324  Gross per 24 hour   Intake 1260 ml   Output --   Net 1260 ml       Physical Exam:   Constitutional: She is oriented to person, place, and time. She appears well-developed and well-nourished. No distress.    HENT:   Head: Normocephalic and atraumatic.    Eyes: Conjunctivae and EOM are normal.    Neck: Normal range of motion. Neck supple. No thyromegaly present.    Cardiovascular: Normal rate, regular rhythm and normal heart sounds.     Pulmonary/Chest: Effort normal and breath sounds normal. No respiratory  distress.        Abdominal: Soft. Bowel sounds are normal. She exhibits abdominal incision (well healed port site incisions, c/d/i). She exhibits no distension. There is no tenderness.     Genitourinary: Vaginal discharge (minimal) found.           Musculoskeletal: She exhibits no edema or tenderness.       Neurological: She is alert and oriented to person, place, and time.    Skin: Skin is warm and dry. No rash noted.    Psychiatric: She has a normal mood and affect. Her behavior is normal.       Laboratory:  CBC:   Recent Labs   Lab 04/01/19  2254   WBC 6.77   RBC 3.78*   HGB 11.3*   HCT 35.4*      MCV 94   MCH 29.9   MCHC 31.9*     Recent Labs   Lab 03/29/19  1750 04/01/19  2254 04/03/19  0512    140 141   K 3.9 4.9 4.4    106 106   CO2 26 26 27   BUN 14 19 17   CREATININE 0.8 0.8 0.8   * 86 94   MG 2.3  --   --    PHOS 3.0  --   --          Diagnostic Results:  CT: Reviewed     Impression  4.6 cm mixed density collection concerning for abscess formation within the right adnexa abutting the sigmoid colon with colonic involvement not entirely excluded. There is surrounding mesenteric haziness in fat stranding.    Large hiatal hernia.    Cholelithiasis without evidence of acute cholecystitis.    Dextroscoliosis.

## 2019-04-03 NOTE — PROGRESS NOTES
[facility]  Obstetrics & Gynecology  Progress Note    Patient Name: Jenny Caro  MRN: [id number]  Admission Date: 2019  Primary Care Provider: Eliceo Beatty MD  Principal Problem: Postoperative abscess    Subjective:     HPI:  Patient is a 63 y.o. , POD#19 from a robotic assisted hysterectomy/sacro-colpopexy/posterior colporraphy who presents as a direct admit for management of postop abscess.  Patient seen in ED on 3/29 with subjective fevers/chills, and noted to have tenderness and erythema at the vaginal cuff. She was subsequently diagnosed with vaginal cuff cellulitis and prescribed Augmentin/Flagyl.  She was seen for follow up today in urogyn clinic, and noted to have green-gray vaginal discharge on exam. CT abd/pelvis was obtained, and was concerning for a pelvic abscess.    Patient reports last fever was 3/29. She currently denies any complaints, other than a scant vaginal discharge. No abdominal/pelvic pain, vaginal bleeding, dysuria, nausea, or vomiting. States she has been tolerating a regular diet at home without difficulty. Has not required pain medications for postop pain/cramping.     Of note, patient's PMH is significant for ulcerative colitis, HTN, GERD, and Hyperlipidemia.       Interval History:   Patient tolerated dinner last night, no N/V. She remains afebrile. She denies pain. She reports continued brown vaginal discharge. She denies chest pain or SOB.     Scheduled Meds:   cetirizine  10 mg Oral Daily    lisinopril  10 mg Oral Daily    mesalamine  2.4 g Oral Daily with breakfast    metronidazole  500 mg Intravenous Q8H    pantoprazole  40 mg Oral Daily    piperacillin-tazobactam (ZOSYN) IVPB  4.5 g Intravenous Q8H    pravastatin  40 mg Oral Daily     Continuous Infusions:  PRN Meds:acetaminophen, diphenhydrAMINE, HYDROcodone-acetaminophen, HYDROcodone-acetaminophen, ibuprofen, ondansetron, promethazine (PHENERGAN) IVPB, sodium chloride  0.9%    Review of patient's allergies indicates:   Allergen Reactions    Ciprofloxacin Swelling    Bactrim [sulfamethoxazole-trimethoprim] Rash       Objective:     Vital Signs (Most Recent):  Temp: 97.9 °F (36.6 °C) (04/03/19 0501)  Pulse: 68 (04/03/19 0501)  Resp: 20 (04/03/19 0501)  BP: (!) 148/82 (04/03/19 0501)  SpO2: 99 % (04/03/19 0501) Vital Signs (24h Range):  Temp:  [97.9 °F (36.6 °C)-98.7 °F (37.1 °C)] 97.9 °F (36.6 °C)  Pulse:  [] 68  Resp:  [18-20] 20  SpO2:  [95 %-99 %] 99 %  BP: (116-148)/(65-98) 148/82     Weight: 109.8 kg (242 lb 1 oz)  Body mass index is 42.88 kg/m².  No LMP recorded. Patient is postmenopausal.    I&O (Last 24H):    Intake/Output Summary (Last 24 hours) at 4/3/2019 0620  Last data filed at 4/2/2019 2324  Gross per 24 hour   Intake 1260 ml   Output --   Net 1260 ml       Physical Exam:   Constitutional: She is oriented to person, place, and time. She appears well-developed and well-nourished. No distress.    HENT:   Head: Normocephalic and atraumatic.    Eyes: Conjunctivae and EOM are normal.    Neck: Normal range of motion. Neck supple. No thyromegaly present.    Cardiovascular: Normal rate, regular rhythm and normal heart sounds.     Pulmonary/Chest: Effort normal and breath sounds normal. No respiratory distress.        Abdominal: Soft. Bowel sounds are normal. She exhibits abdominal incision (well healed port site incisions, c/d/i). She exhibits no distension. There is no tenderness.     Genitourinary: Vaginal discharge (minimal) found.           Musculoskeletal: She exhibits no edema or tenderness.       Neurological: She is alert and oriented to person, place, and time.    Skin: Skin is warm and dry. No rash noted.    Psychiatric: She has a normal mood and affect. Her behavior is normal.       Laboratory:  CBC:   Recent Labs   Lab 04/01/19  2254   WBC 6.77   RBC 3.78*   HGB 11.3*   HCT 35.4*      MCV 94   MCH 29.9   MCHC 31.9*     Recent Labs   Lab  03/29/19  1750 04/01/19  2254 04/03/19  0512    140 141   K 3.9 4.9 4.4    106 106   CO2 26 26 27   BUN 14 19 17   CREATININE 0.8 0.8 0.8   * 86 94   MG 2.3  --   --    PHOS 3.0  --   --          Diagnostic Results:  CT: Reviewed     Impression  4.6 cm mixed density collection concerning for abscess formation within the right adnexa abutting the sigmoid colon with colonic involvement not entirely excluded. There is surrounding mesenteric haziness in fat stranding.    Large hiatal hernia.    Cholelithiasis without evidence of acute cholecystitis.    Dextroscoliosis.    Assessment/Plan:     * Postoperative abscess  - VSS, afebrile  - CT scan with pelvic abscess, see read above  - WBC 14 >> 6  - Continue IV Zosyn/Flagyl  - BMP this AM normal, Cr 0.8  - Aerobe/Anaerobe Cx from vaginal discharge on 3/29: Gram Neg Rods pansensitive, Prevotella  - Urine Cx from 3/29 negative  - Blood Cx from 3/29 prelim negative  - Repeat Aerobe/Anaerobe Cx pending  - Motrin/Norco for pain PRN  - Zofran PRN  - CRS reviewed imaging, rec considering drainage  - IR rec to hold on drain and allow continued drainage vaginally. Consider drain if clinical status worsens   - Regular diet    - TEDs/SCDs for DVT ppx    Hyperlipidemia  - continue home Pravastatin 40mg daily    Ulcerative colitis  - continue home Lialda 2.4mg daily  - GI has seen patient and reports UC appears stable    Gastroesophageal reflux disease  - Home med Prilosec not on formulary  - Protonix 40mg daily    HTN (hypertension)  - will monitor BP q 4 hours  - Continue home Lisinopril 10mg daily  - BP: (116-148)/(65-98) 148/82           Hodan Uribe MD  Obstetrics & Gynecology  [facility]

## 2019-04-03 NOTE — NURSING
MD Marija notified of pt's HR was 130 @ 1959 and now pt is refusing for nurse to recheck HR at this time, pt states she is declining treatment due to just getting insurance on 4/1/19. Patient stated that I can recheck her HR at midnight. Patient is educated on the risk for increase HR, but still refuse for the nurse to assess at this time. No new orders given at this time. MD Marija stated that she will come by and speak with pt.    00:00-Apical HR 73, will continue to monitor.

## 2019-04-03 NOTE — ASSESSMENT & PLAN NOTE
Fevers defervesced on augmentin prior to admit. No leukocytosis. No current symptoms. She has drained spontaneously and has grown pan-susceptible e-coli, prevotella, and morganella (susceptibilities pending). Spoke with radiology regarding possible involvement of mesh. They said mesh is radiopaque and can not be seen. Based on op report documentation of where the mesh is, there is a possibility it is involved. IR could not drain 4.6cm abscess given location. Given that abscess could not be drained, recommend prolonged course of abx. Agree with augmentin 875/125 po bid for about 2 weeks until ID f/u (have requested appt on 4/16). Would check ESR/CRP now and will repeat this at f/u. Plan for repeat CT at end of therapy to make sure abscess has resolved. Discussed plan of care with GYN resident

## 2019-04-03 NOTE — PROGRESS NOTES
"Gastroenterology Progress Note    Active Hospital Problems    *Postoperative abscess      Hyperlipidemia      HTN (hypertension)      Gastroesophageal reflux disease      Ulcerative colitis        Subjective:  Patient seen and examined.  The patient is doing well this AM.  Tolerating diet.    Reviews of Systems:  General:  Negative for fever or chills  Cardiovascular:  Negative for chest pain, shortness of breath, palpitations    Physical Exam    Vitals:  /74   Pulse 70   Temp 96.7 °F (35.9 °C)   Resp 18   Ht 5' 3" (1.6 m)   Wt 109.8 kg (242 lb 1 oz)   SpO2 97%   Breastfeeding? No   BMI 42.88 kg/m²     Constitutional: awake, nad  HEENT: anicteric sclera  Cardiovascular: Normal rate, normal rhythm, no murmurs appreciated  Respiratory: Equal BS bilaterally without distress  GI:  Soft, NTND, normal bowel sounds  Musculoskeletal:  No edema    Medications/Infusions:  Current Facility-Administered Medications   Medication Dose Route Frequency Provider Last Rate Last Dose    acetaminophen tablet 650 mg  650 mg Oral Q6H PRN Kimberly Kern MD        cetirizine tablet 10 mg  10 mg Oral Daily Anita Carolina MD   10 mg at 04/03/19 0800    diphenhydrAMINE capsule 50 mg  50 mg Oral Nightly PRN Kimberly Kern MD        HYDROcodone-acetaminophen  mg per tablet 1 tablet  1 tablet Oral Q4H PRN Kimberly Kern MD        HYDROcodone-acetaminophen 5-325 mg per tablet 1 tablet  1 tablet Oral Q4H PRN Kimberly Kern MD        ibuprofen tablet 600 mg  600 mg Oral Q6H PRN Kimberly Kern MD   600 mg at 04/02/19 2036    lisinopril tablet 10 mg  10 mg Oral Daily Kimberly Kern MD   10 mg at 04/03/19 0800    mesalamine (LIALDA) EC tablet 1.2 g  2.4 g Oral Daily with breakfast Kimberly Kern MD   2.4 g at 04/03/19 0800    metronidazole IVPB 500 mg  500 mg Intravenous Q8H Kimberly Kern  mL/hr at 04/03/19 0630 500 mg at 04/03/19 0630    ondansetron disintegrating " tablet 8 mg  8 mg Oral Q8H PRN Kimberly Kern MD        pantoprazole EC tablet 40 mg  40 mg Oral Daily Kimberly Kern MD   40 mg at 04/03/19 0800    piperacillin-tazobactam 4.5 g in dextrose 5 % 100 mL IVPB (ready to mix system)  4.5 g Intravenous Q8H Kimberly Kern  mL/hr at 04/03/19 0602 4.5 g at 04/03/19 0602    pravastatin tablet 40 mg  40 mg Oral Daily Kimberly Kern MD   40 mg at 04/02/19 2036    promethazine (PHENERGAN) 12.5 mg in dextrose 5 % 50 mL IVPB  12.5 mg Intravenous Q6H PRN Kimberly Kern MD        sodium chloride 0.9% flush 10 mL  10 mL Intravenous PRN Kimberly Kern MD           Intake and Output:    Intake/Output Summary (Last 24 hours) at 4/3/2019 0842  Last data filed at 4/3/2019 0621  Gross per 24 hour   Intake 2320 ml   Output --   Net 2320 ml       Labs:    Results for COOLTWIN LATHAM (MRN [id number]) as of 4/3/2019 08:42   Ref. Range 4/3/2019 05:12   Sodium Latest Ref Range: 136 - 145 mmol/L 141   Potassium Latest Ref Range: 3.5 - 5.1 mmol/L 4.4   Chloride Latest Ref Range: 95 - 110 mmol/L 106   CO2 Latest Ref Range: 23 - 29 mmol/L 27   Anion Gap Latest Ref Range: 8 - 16 mmol/L 8   BUN, Bld Latest Ref Range: 8 - 23 mg/dL 17   Creatinine Latest Ref Range: 0.5 - 1.4 mg/dL 0.8   eGFR if non African American Latest Ref Range: >60 mL/min/1.73 m^2 >60   eGFR if  Latest Ref Range: >60 mL/min/1.73 m^2 >60   Glucose Latest Ref Range: 70 - 110 mg/dL 94   Calcium Latest Ref Range: 8.7 - 10.5 mg/dL 9.3       Imaging and other studies:    No new    Assessment:    1.  Ulcerative colitis  2.  Pelvic abscess s/p recent robotic hysterectomy, sacro-colpopexy, and posterior colporraphy with mesh        Plan:    1.  Ulcerative colitis is stable.  Continue with Lialda 2.4 grams daily  2.  Advance diet as tolerated  3.  Probiotics  4.  Available as needed.

## 2019-04-03 NOTE — HPI
"63F s/p robotic assisted hysterectomy/sacro-colpopexy/posterior colporraphy with vaginal mesh placement on 3/13/19 admitted with fever and vaginal discharge. Reports symptoms started last Wednesday-- notes slimy discharge with wiping, then felt achy and spiked fever on Friday and was started on augmentin with defervescence of fever. Metronidazole was added on Friday and noted persistent brown drainage. Went to routine gyn appt and noted that she felt a "gush" of brown drainage prior to pelvic exam being done. Denies any abdominal pain or fever. Was direct admitted for further management. Reports allergy to cipro-- throat tightness, denies swelling. Denies fever since admit. Denies abdominal pain. Feels ready to go home    On vanc/zosyn. Vaginal drainage was cultured and is growing pan-susceptible e-coli and morganella and anaerobes.     CT showed:  4.6 cm mixed density collection concerning for abscess formation within the right adnexa abutting the sigmoid colon with colonic involvement not entirely excluded.  There is surrounding mesenteric haziness in fat stranding.    IR was consulted for drainage, but anatomically not possible      Specimen: Abscess from Perineum Updated: 04/03/19 1117    Aerobic Bacterial Culture --    MORGANELLA MORGANII   Moderate   Susceptibility pending    Culture, Anaerobe [[id number]] Collected: 03/29/19 2009   Order Status: Completed Specimen: Vagina from Groin Updated: 04/03/19 1101    Anaerobic Culture --    PREVOTELLA (B.) DISIENS   Moderate     Anaerobic Culture --    PREVOTELLA TIMONENSIS   Moderate    Aerobic culture [[id number]]  Collected: 03/29/19 2009   Order Status: Completed Specimen: Vagina from Groin Updated: 04/02/19 0730    Aerobic Bacterial Culture --    ESCHERICHIA COLI   Moderate   No other significant isolate        "

## 2019-04-03 NOTE — SUBJECTIVE & OBJECTIVE
Interval History:   Patient tolerated dinner last night, no N/V. She remains afebrile. She denies pain. She reports continued brown vaginal discharge. She denies chest pain or SOB.     Scheduled Meds:   cetirizine  10 mg Oral Daily    lisinopril  10 mg Oral Daily    mesalamine  2.4 g Oral Daily with breakfast    metronidazole  500 mg Intravenous Q8H    pantoprazole  40 mg Oral Daily    piperacillin-tazobactam (ZOSYN) IVPB  4.5 g Intravenous Q8H    pravastatin  40 mg Oral Daily     Continuous Infusions:  PRN Meds:acetaminophen, diphenhydrAMINE, HYDROcodone-acetaminophen, HYDROcodone-acetaminophen, ibuprofen, ondansetron, promethazine (PHENERGAN) IVPB, sodium chloride 0.9%    Review of patient's allergies indicates:   Allergen Reactions    Ciprofloxacin Swelling    Bactrim [sulfamethoxazole-trimethoprim] Rash       Objective:     Vital Signs (Most Recent):  Temp: 98.5 °F (36.9 °C) (04/03/19 1617)  Pulse: 86 (04/03/19 1617)  Resp: 16 (04/03/19 1617)  BP: 123/75 (04/03/19 1617)  SpO2: (!) 94 % (04/03/19 1617) Vital Signs (24h Range):  Temp:  [96.7 °F (35.9 °C)-98.5 °F (36.9 °C)] 98.5 °F (36.9 °C)  Pulse:  [] 86  Resp:  [16-20] 16  SpO2:  [94 %-99 %] 94 %  BP: (116-148)/(65-82) 123/75     Weight: 109.8 kg (242 lb 1 oz)  Body mass index is 42.88 kg/m².  No LMP recorded. Patient is postmenopausal.    I&O (Last 24H):    Intake/Output Summary (Last 24 hours) at 4/3/2019 1635  Last data filed at 4/3/2019 0621  Gross per 24 hour   Intake 2320 ml   Output --   Net 2320 ml       Physical Exam:   Constitutional: She is oriented to person, place, and time. She appears well-developed and well-nourished. No distress.    HENT:   Head: Normocephalic and atraumatic.    Eyes: Conjunctivae and EOM are normal.    Neck: Normal range of motion. Neck supple. No thyromegaly present.    Cardiovascular: Normal rate, regular rhythm and normal heart sounds.     Pulmonary/Chest: Effort normal and breath sounds normal. No respiratory  distress.        Abdominal: Soft. Bowel sounds are normal. She exhibits abdominal incision (well healed port site incisions, c/d/i). She exhibits no distension. There is no tenderness.     Genitourinary: Vaginal discharge (minimal) found.           Musculoskeletal: She exhibits no edema or tenderness.       Neurological: She is alert and oriented to person, place, and time.    Skin: Skin is warm and dry. No rash noted.    Psychiatric: She has a normal mood and affect. Her behavior is normal.       Laboratory:  CBC:   Recent Labs   Lab 04/01/19  2254   WBC 6.77   RBC 3.78*   HGB 11.3*   HCT 35.4*      MCV 94   MCH 29.9   MCHC 31.9*     Recent Labs   Lab 03/29/19  1750 04/01/19  2254 04/03/19  0512    140 141   K 3.9 4.9 4.4    106 106   CO2 26 26 27   BUN 14 19 17   CREATININE 0.8 0.8 0.8   * 86 94   MG 2.3  --   --    PHOS 3.0  --   --          Diagnostic Results:  CT: Reviewed     Impression  4.6 cm mixed density collection concerning for abscess formation within the right adnexa abutting the sigmoid colon with colonic involvement not entirely excluded. There is surrounding mesenteric haziness in fat stranding.    Large hiatal hernia.    Cholelithiasis without evidence of acute cholecystitis.    Dextroscoliosis.    Review of Systems    Physical Exam   Constitutional: She is oriented to person, place, and time. She appears well-developed and well-nourished. No distress.   HENT:   Head: Normocephalic and atraumatic.   Eyes: Conjunctivae and EOM are normal.   Neck: Normal range of motion. Neck supple. No thyromegaly present.   Cardiovascular: Normal rate, regular rhythm and normal heart sounds.   Pulmonary/Chest: Effort normal and breath sounds normal. No respiratory distress.   Abdominal: Soft. Bowel sounds are normal. She exhibits no distension. There is no tenderness.   Genitourinary: Vaginal discharge (minimal) found.   Musculoskeletal: She exhibits no edema or tenderness.   Neurological:  She is alert and oriented to person, place, and time.   Skin: Skin is warm and dry. No rash noted.   Psychiatric: She has a normal mood and affect. Her behavior is normal.   Vitals reviewed.

## 2019-04-03 NOTE — PLAN OF CARE
Problem: Adult Inpatient Plan of Care  Goal: Plan of Care Review  Outcome: Outcome(s) achieved Date Met: 04/03/19  Eager & in agreement w/ DC. VU of DC instructions--paperwork & prescriptions passed & explained. IV removed w/ cath tip intact, WNL. To be DCd home w/ -- will be escorted downstairs via  transport team once dressed and ready. Free from falls, injury, or skin breakdown this hospital admission.

## 2019-04-03 NOTE — ASSESSMENT & PLAN NOTE
- will monitor BP q 4 hours  - Continue home Lisinopril 10mg daily  - BP: (116-148)/(65-98) 148/82

## 2019-04-03 NOTE — ASSESSMENT & PLAN NOTE
- VSS, afebrile  - CT scan with pelvic abscess, see read above  - WBC 14 >> 6  - Continue IV Zosyn/Flagyl  - BMP this AM normal, Cr 0.8  - Aerobe/Anaerobe Cx from vaginal discharge on 3/29: Gram Neg Rods pansensitive, Prevotella  - Urine Cx from 3/29 negative  - Blood Cx from 3/29 prelim negative  - Repeat Aerobe/Anaerobe Cx pending  - Motrin/Norco for pain PRN  - Zofran PRN  - CRS reviewed imaging, rec considering drainage  - IR rec to hold on drain and allow continued drainage vaginally. Consider drain if clinical status worsens   - Regular diet    - TEDs/SCDs for DVT ppx

## 2019-04-03 NOTE — PLAN OF CARE
Problem: Adult Inpatient Plan of Care  Goal: Plan of Care Review  Outcome: Ongoing (interventions implemented as appropriate)  Plan of care reviewed with patient. Pt remains free from fall, injury, and skin breakdown. Positions self independently. Purposeful hourly rounding done. Patient has call light within reach, bed brakes lock, side rails up x2, bed in low position, and nonskid socks on. Patient lying in bed in no distress. Will continue to monitor.

## 2019-04-03 NOTE — CONSULTS
[facility]  Infectious Disease  Consult Note    Patient Name: Jenny Caro  MRN: [id number]  Admission Date: 4/1/2019  Hospital Length of Stay: 1 days  Attending Physician: Fatou Marino MD  Primary Care Provider: Eliceo Beatty MD     Isolation Status: No active isolations    Patient information was obtained from patient and ER records.      Inpatient consult to Infectious Diseases  Consult performed by: Jesika Hill MD  Consult ordered by: Fatou aMrino MD        Assessment/Plan:     * Postoperative abscess  Fevers defervesced on augmentin prior to admit. No leukocytosis. No current symptoms. She has drained spontaneously and has grown pan-susceptible e-coli, prevotella, and morganella (susceptibilities pending). Spoke with radiology regarding possible involvement of mesh. They said mesh is radiopaque and can not be seen. Based on op report documentation of where the mesh is, there is a possibility it is involved. IR could not drain 4.6cm abscess given location. Given that abscess could not be drained, recommend prolonged course of abx. Agree with augmentin 875/125 po bid for about 2 weeks until ID f/u (have requested appt on 4/16). Would check ESR/CRP now and will repeat this at f/u. Plan for repeat CT at end of therapy to make sure abscess has resolved. Discussed plan of care with GYN resident        Thank you for your consult. I will sign off. Please contact us if you have any additional questions.    Jesika Hill MD  Infectious Disease  [facility]    Subjective:     Principal Problem: Postoperative abscess    HPI:   63F s/p robotic assisted hysterectomy/sacro-colpopexy/posterior colporraphy with vaginal mesh placement on 3/13/19 admitted with fever and vaginal discharge. Reports symptoms started last Wednesday-- notes slimy discharge with wiping, then felt achy and spiked fever on Friday and was started on augmentin with defervescence of fever.  "Metronidazole was added on Friday and noted persistent brown drainage. Went to routine gyn appt and noted that she felt a "gush" of brown drainage prior to pelvic exam being done. Denies any abdominal pain or fever. Was direct admitted for further management. Reports allergy to cipro-- throat tightness, denies swelling. Denies fever since admit. Denies abdominal pain. Feels ready to go home    On vanc/zosyn. Vaginal drainage was cultured and is growing pan-susceptible e-coli and morganella and anaerobes.     CT showed:  4.6 cm mixed density collection concerning for abscess formation within the right adnexa abutting the sigmoid colon with colonic involvement not entirely excluded.  There is surrounding mesenteric haziness in fat stranding.    IR was consulted for drainage, but anatomically not possible      Specimen: Abscess from Perineum Updated: 04/03/19 1117    Aerobic Bacterial Culture --    MORGANELLA MORGANII   Moderate   Susceptibility pending    Culture, Anaerobe [[id number]] Collected: 03/29/19 2009   Order Status: Completed Specimen: Vagina from Groin Updated: 04/03/19 1101    Anaerobic Culture --    PREVOTELLA (B.) DISIENS   Moderate     Anaerobic Culture --    PREVOTELLA TIMONENSIS   Moderate    Aerobic culture [[id number]]  Collected: 03/29/19 2009   Order Status: Completed Specimen: Vagina from Groin Updated: 04/02/19 0730    Aerobic Bacterial Culture --    ESCHERICHIA COLI   Moderate   No other significant isolate        Interval History:   Patient tolerated dinner last night, no N/V. She remains afebrile. She denies pain. She reports continued brown vaginal discharge. She denies chest pain or SOB.     Scheduled Meds:   cetirizine  10 mg Oral Daily    lisinopril  10 mg Oral Daily    mesalamine  2.4 g Oral Daily with breakfast    metronidazole  500 mg Intravenous Q8H    pantoprazole  40 mg Oral Daily    piperacillin-tazobactam (ZOSYN) IVPB  4.5 g Intravenous Q8H    pravastatin  40 mg Oral Daily "     Continuous Infusions:  PRN Meds:acetaminophen, diphenhydrAMINE, HYDROcodone-acetaminophen, HYDROcodone-acetaminophen, ibuprofen, ondansetron, promethazine (PHENERGAN) IVPB, sodium chloride 0.9%    Review of patient's allergies indicates:   Allergen Reactions    Ciprofloxacin Swelling    Bactrim [sulfamethoxazole-trimethoprim] Rash       Objective:     Vital Signs (Most Recent):  Temp: 98.5 °F (36.9 °C) (04/03/19 1617)  Pulse: 86 (04/03/19 1617)  Resp: 16 (04/03/19 1617)  BP: 123/75 (04/03/19 1617)  SpO2: (!) 94 % (04/03/19 1617) Vital Signs (24h Range):  Temp:  [96.7 °F (35.9 °C)-98.5 °F (36.9 °C)] 98.5 °F (36.9 °C)  Pulse:  [] 86  Resp:  [16-20] 16  SpO2:  [94 %-99 %] 94 %  BP: (116-148)/(65-82) 123/75     Weight: 109.8 kg (242 lb 1 oz)  Body mass index is 42.88 kg/m².  No LMP recorded. Patient is postmenopausal.    I&O (Last 24H):    Intake/Output Summary (Last 24 hours) at 4/3/2019 1635  Last data filed at 4/3/2019 0621  Gross per 24 hour   Intake 2320 ml   Output --   Net 2320 ml       Physical Exam:   Constitutional: She is oriented to person, place, and time. She appears well-developed and well-nourished. No distress.    HENT:   Head: Normocephalic and atraumatic.    Eyes: Conjunctivae and EOM are normal.    Neck: Normal range of motion. Neck supple. No thyromegaly present.    Cardiovascular: Normal rate, regular rhythm and normal heart sounds.     Pulmonary/Chest: Effort normal and breath sounds normal. No respiratory distress.        Abdominal: Soft. Bowel sounds are normal. She exhibits abdominal incision (well healed port site incisions, c/d/i). She exhibits no distension. There is no tenderness.     Genitourinary: Vaginal discharge (minimal) found.           Musculoskeletal: She exhibits no edema or tenderness.       Neurological: She is alert and oriented to person, place, and time.    Skin: Skin is warm and dry. No rash noted.    Psychiatric: She has a normal mood and affect. Her behavior is  normal.       Laboratory:  CBC:   Recent Labs   Lab 04/01/19  2254   WBC 6.77   RBC 3.78*   HGB 11.3*   HCT 35.4*      MCV 94   MCH 29.9   MCHC 31.9*     Recent Labs   Lab 03/29/19  1750 04/01/19  2254 04/03/19  0512    140 141   K 3.9 4.9 4.4    106 106   CO2 26 26 27   BUN 14 19 17   CREATININE 0.8 0.8 0.8   * 86 94   MG 2.3  --   --    PHOS 3.0  --   --          Diagnostic Results:  CT: Reviewed     Impression  4.6 cm mixed density collection concerning for abscess formation within the right adnexa abutting the sigmoid colon with colonic involvement not entirely excluded. There is surrounding mesenteric haziness in fat stranding.    Large hiatal hernia.    Cholelithiasis without evidence of acute cholecystitis.    Dextroscoliosis.    Review of Systems    Physical Exam   Constitutional: She is oriented to person, place, and time. She appears well-developed and well-nourished. No distress.   HENT:   Head: Normocephalic and atraumatic.   Eyes: Conjunctivae and EOM are normal.   Neck: Normal range of motion. Neck supple. No thyromegaly present.   Cardiovascular: Normal rate, regular rhythm and normal heart sounds.   Pulmonary/Chest: Effort normal and breath sounds normal. No respiratory distress.   Abdominal: Soft. Bowel sounds are normal. She exhibits no distension. There is no tenderness.   Genitourinary: Vaginal discharge (minimal) found.   Musculoskeletal: She exhibits no edema or tenderness.   Neurological: She is alert and oriented to person, place, and time.   Skin: Skin is warm and dry. No rash noted.   Psychiatric: She has a normal mood and affect. Her behavior is normal.   Vitals reviewed.

## 2019-04-04 LAB — BACTERIA SPEC AEROBE CULT: NORMAL

## 2019-04-04 NOTE — DISCHARGE SUMMARY
[facility]  Obstetrics & Gynecology  Discharge Summary    Patient Name: Jenny Caro  MRN: [id number]  Admission Date: 2019  Hospital Length of Stay: 1 days  Discharge Date and Time:  2019 7:14 PM  Attending Physician: No att. providers found   Discharging Provider: Hodan Uribe MD  Primary Care Provider: Eliceo Beatty MD    HPI:  Patient is a 63 y.o. , POD#19 from a robotic assisted hysterectomy/sacro-colpopexy/posterior colporraphy who presents as a direct admit for management of postop abscess.  Patient seen in ED on 3/29 with subjective fevers/chills, and noted to have tenderness and erythema at the vaginal cuff. She was subsequently diagnosed with vaginal cuff cellulitis and prescribed Augmentin/Flagyl.  She was seen for follow up today in urogyn clinic, and noted to have green-gray vaginal discharge on exam. CT abd/pelvis was obtained, and was concerning for a pelvic abscess.    Patient reports last fever was 3/29. She currently denies any complaints, other than a scant vaginal discharge. No abdominal/pelvic pain, vaginal bleeding, dysuria, nausea, or vomiting. States she has been tolerating a regular diet at home without difficulty. Has not required pain medications for postop pain/cramping.     Of note, patient's PMH is significant for ulcerative colitis, HTN, GERD, and Hyperlipidemia.       Hospital Course:  2019 Admitted overnight for possible postoperative abscess.  Started on IV antibiotics and recultured.  CT results pending. Patient tolerated dinner last night, no N/V. She remains afebrile. She denies pain.   2019  - Continues to remain afebrile. Denies pain. Reports continued slight vaginal discharge.   IR consult while inpatient, does not recommend IR drain placement as patient is improving clinically  GI consult while inpatient, evaluated and UC found to be stable.   ID consult while inpatient. Recommends transitioning to PO augmentin.   ID to see patient in 2 weeks in their clinic to assess clinical status.  Per discussion, will decide on re-imaging at this time.   Patient is stable for discharge. Will be seen as outpatient by ID and urogynecology.   Infection and postoperative precautions given.         * No surgery found *     Consults (From admission, onward)        Status Ordering Provider     Inpatient consult to Gastroenterology  Once     Provider:  Danay Capellan MD    Completed [facility]     Inpatient consult to Infectious Diseases  Once     Provider:  Jesika Hill MD    Completed [facility]          Significant Diagnostic Studies: Labs:   CBC   Recent Labs   Lab 04/01/19  2254   WBC 6.77   HGB 11.3*   HCT 35.4*          Pending Diagnostic Studies:     None        Final Active Diagnoses:    Diagnosis Date Noted POA    PRINCIPAL PROBLEM:  Postoperative abscess [T81.49XA] 04/02/2019 Unknown    Hyperlipidemia [E78.5] 04/02/2019 Yes    HTN (hypertension) [I10] 01/09/2014 Yes     Chronic    Gastroesophageal reflux disease [K21.9] 01/09/2014 Yes     Chronic    Ulcerative colitis [K51.90] 01/09/2014 Yes     Chronic      Problems Resolved During this Admission:        Discharged Condition: good    Disposition: Home or Self Care    Follow Up:    Patient Instructions:   No discharge procedures on file.  Medications:  Reconciled Home Medications:      Medication List      CHANGE how you take these medications    * amoxicillin-clavulanate 875-125mg 875-125 mg per tablet  Commonly known as:  AUGMENTIN  Take 1 tablet by mouth 2 (two) times daily. for 10 days  What changed:  You were already taking a medication with the same name, and this prescription was added. Make sure you understand how and when to take each.     * amoxicillin-clavulanate 875-125mg 875-125 mg per tablet  Commonly known as:  AUGMENTIN  Take 1 tablet by mouth 2 (two) times daily.  What changed:  Another medication with the same name was added. Make sure you  understand how and when to take each.         * This list has 2 medication(s) that are the same as other medications prescribed for you. Read the directions carefully, and ask your doctor or other care provider to review them with you.            CONTINUE taking these medications    acetaminophen 650 MG Tbsr  Commonly known as:  TYLENOL  Take 650 mg by mouth as needed.     aspirin 81 MG EC tablet  Commonly known as:  ECOTRIN  Take 81 mg by mouth once daily.     biotin 1 mg Cap  Take by mouth.     cetirizine 10 MG tablet  Commonly known as:  ZYRTEC  Take 10 mg by mouth once daily.     diphenhydrAMINE 50 MG capsule  Commonly known as:  BENADRYL  Take 50 mg by mouth nightly as needed for Itching.     GAS-X ORAL  Take by mouth once daily.     ibuprofen 600 MG tablet  Commonly known as:  ADVIL,MOTRIN  Take 1 tablet (600 mg total) by mouth every 6 (six) hours as needed for Pain.     krill oil 500 mg Cap  Take by mouth once daily.     LIALDA 1.2 gram Tbec  Generic drug:  mesalamine  2.4 g daily with breakfast.     lisinopril 10 MG tablet  Take 10 mg by mouth once daily.     loratadine 10 mg tablet  Commonly known as:  CLARITIN  Take 10 mg by mouth once daily.     OCUVITE ORAL  Take by mouth.     omeprazole 20 MG capsule  Commonly known as:  PRILOSEC  20 mg once daily.     pravastatin 40 MG tablet  Commonly known as:  PRAVACHOL     VITAMIN C 100 MG tablet  Generic drug:  ascorbic acid (vitamin C)  Take 100 mg by mouth once daily.        STOP taking these medications    metroNIDAZOLE 500 MG tablet  Commonly known as:  YL            Hodan Uribe MD  Obstetrics & Gynecology  [facility]

## 2019-04-05 ENCOUNTER — TELEPHONE (OUTPATIENT)
Dept: UROGYNECOLOGY | Facility: CLINIC | Age: 64
End: 2019-04-05

## 2019-04-05 ENCOUNTER — TELEPHONE (OUTPATIENT)
Dept: INFECTIOUS DISEASES | Facility: HOSPITAL | Age: 64
End: 2019-04-05

## 2019-04-05 DIAGNOSIS — Z87.898 HISTORY OF ABDOMINAL ABSCESS: Primary | ICD-10-CM

## 2019-04-05 RX ORDER — METRONIDAZOLE 500 MG/1
500 TABLET ORAL EVERY 8 HOURS
Qty: 45 TABLET | Refills: 0 | Status: SHIPPED | OUTPATIENT
Start: 2019-04-05 | End: 2019-04-09 | Stop reason: SDUPTHER

## 2019-04-05 RX ORDER — CEFPODOXIME PROXETIL 200 MG/1
400 TABLET, FILM COATED ORAL EVERY 12 HOURS
Qty: 28 TABLET | Refills: 0 | Status: SHIPPED | OUTPATIENT
Start: 2019-04-05 | End: 2019-04-09 | Stop reason: SDUPTHER

## 2019-04-05 NOTE — TELEPHONE ENCOUNTER
----- Message from Fatou Marino MD sent at 4/3/2019  6:42 PM CDT -----  Regarding: please make sure patient has follow up with Dr. Lozano in April  Jose Monroy:  Patient s/p OR 3/13/19.  Was in hospital last few days. Can you please make sure she has follow up appt with Dr. DEAL while she's here in April? Thanks!

## 2019-04-05 NOTE — TELEPHONE ENCOUNTER
susceptibilites from morganella culture returned resistent to amp/sulbactam. Spoke with patient and reviewed allergies (notes throat tightening with quinolones and itching with bactrim). Denies issues with taking keflex before. Change augmentin to cefpodoxime and metronidazole x 2 weeks with repeat CT at end of treatment.

## 2019-04-08 ENCOUNTER — TELEPHONE (OUTPATIENT)
Dept: UROGYNECOLOGY | Facility: CLINIC | Age: 64
End: 2019-04-08

## 2019-04-08 LAB — BACTERIA SPEC ANAEROBE CULT: NORMAL

## 2019-04-08 NOTE — TELEPHONE ENCOUNTER
Spoke with pt to confirm that she can come in on 4/9/19 at 10 am for a post-op visit, pt voiced understanding and call was ended.

## 2019-04-09 ENCOUNTER — TELEPHONE (OUTPATIENT)
Dept: INFECTIOUS DISEASES | Facility: CLINIC | Age: 64
End: 2019-04-09

## 2019-04-09 ENCOUNTER — OFFICE VISIT (OUTPATIENT)
Dept: UROGYNECOLOGY | Facility: CLINIC | Age: 64
End: 2019-04-09
Payer: COMMERCIAL

## 2019-04-09 VITALS
BODY MASS INDEX: 43.09 KG/M2 | DIASTOLIC BLOOD PRESSURE: 80 MMHG | SYSTOLIC BLOOD PRESSURE: 130 MMHG | HEIGHT: 63 IN | WEIGHT: 243.19 LBS

## 2019-04-09 DIAGNOSIS — T81.49XA POSTOPERATIVE ABSCESS: ICD-10-CM

## 2019-04-09 DIAGNOSIS — T81.40XD POSTOPERATIVE INFECTION, SUBSEQUENT ENCOUNTER: Primary | ICD-10-CM

## 2019-04-09 PROCEDURE — 99499 NO LOS: ICD-10-PCS | Mod: S$GLB,,, | Performed by: OBSTETRICS & GYNECOLOGY

## 2019-04-09 PROCEDURE — 99499 UNLISTED E&M SERVICE: CPT | Mod: S$GLB,,, | Performed by: OBSTETRICS & GYNECOLOGY

## 2019-04-09 PROCEDURE — 99999 PR PBB SHADOW E&M-EST. PATIENT-LVL III: CPT | Mod: PBBFAC,,, | Performed by: OBSTETRICS & GYNECOLOGY

## 2019-04-09 PROCEDURE — 99999 PR PBB SHADOW E&M-EST. PATIENT-LVL III: ICD-10-PCS | Mod: PBBFAC,,, | Performed by: OBSTETRICS & GYNECOLOGY

## 2019-04-09 RX ORDER — METRONIDAZOLE 500 MG/1
500 TABLET ORAL EVERY 8 HOURS
Qty: 21 TABLET | Refills: 0 | Status: SHIPPED | OUTPATIENT
Start: 2019-04-09 | End: 2019-04-16 | Stop reason: SDUPTHER

## 2019-04-09 RX ORDER — CEFPODOXIME PROXETIL 200 MG/1
400 TABLET, FILM COATED ORAL EVERY 12 HOURS
Qty: 28 TABLET | Refills: 0 | Status: SHIPPED | OUTPATIENT
Start: 2019-04-09 | End: 2019-04-16

## 2019-04-09 NOTE — PROGRESS NOTES
Post Op Note     4/9/2019  Jenny Caro is a 63 y.o. female status post: feels well overall, no temps since 3/28/201,vaginal drainage has decreased over the past days. Pain well controlled, tolerating PO and ambulating without difficulty. Has PO Abx to cover through Friday not sure if additional dosing is needed.     3/13/19:  PROCEDURE:    1. Robotic assisted Sacrocolpopexy with permanent mesh ([employer]¢â[employer] Ref: [id number] Lot# [id number])   2. Cystourethroscopy  3. Posterior colporrhaphy     doing well with some problems : post operative abscess, admitted by Dr. Marino for IV  antibiotics and managed without patient Augmentin/flgyl changed to Vantin/ Flagyl 4/5/2018.     Incontinence episode: No,   OAB symptoms: No   Incomplete emptying: No  Prolapse symptoms: No      4/1/2019 CT scan : 4.6 cm mixed density collection concerning for abscess formation within the right adnexa abutting the sigmoid colon with colonic involvement not entirely excluded.  There is surrounding mesenteric haziness in fat stranding.    4/1/2019 Vaginal Culture : MORGANELLA MORGANII Moderate - pan sensitive    3/29/2019:    PREVOTELLA (B.) DISIENS   Moderate        Anaerobic Culture PREVOTELLA TIMONENSIS   Moderate           Review of patient's allergies indicates:   Allergen Reactions    Ciprofloxacin Other (See Comments)     Throat tightening    Bactrim [sulfamethoxazole-trimethoprim] Itching      Past Medical History:   Diagnosis Date    Abnormal Pap smear of cervix 2009    LEEP 2009, mild dysplasia normal paps since    Arthritis     ankles  born with club feet    GERD (gastroesophageal reflux disease)     HPV in female     Hyperlipidemia     Hypertension     Ulcerative colitis      Past Surgical History:   Procedure Laterality Date    CERVICAL BIOPSY  W/ LOOP ELECTRODE EXCISION  2009    COLPORRHAPHY, POSTERIOR N/A 3/13/2019    [facility]    CYSTOSCOPY N/A 3/13/2019    [facility] at [facility]    DILATION AND CURETTAGE, UTERUS  11/20/2018    [facility]ind Russell [facility] at [facility] OR    gastric sleeve  08/2015    HYSTEROSCOPY; TRUCLEAR RESECTION OF UTERINE POLYP N/A 11/20/2018    [facility] OR    LAPAROSCOPIC GASTRIC BANDING  2006    ROBOTIC HYSTERECTOMY N/A 3/13/2019    [facility] OR    ROBOTIC SACROCOLPOPEXY, ABDOMEN N/A 3/13/2019    [facility] OR    ROBOTIC SALPINGO-OOPHORECTOMY Bilateral 3/13/2019    [facility] at [facility] OR     Family History   Problem Relation Age of Onset    Diabetes Mother     Heart disease Father     Stroke Sister     Diabetes Sister     Breast cancer Neg Hx        Current Outpatient Medications on File Prior to Visit   Medication Sig Dispense Refill    amoxicillin-clavulanate 875-125mg (AUGMENTIN) 875-125 mg per tablet Take 1 tablet by mouth 2 (two) times daily. for 10 days 20 tablet 0    ascorbic acid, vitamin C, (VITAMIN C) 100 MG tablet Take 100 mg by mouth once daily.      aspirin (ECOTRIN) 81 MG EC tablet Take 81 mg by mouth once daily.      BETA-CAROTENE,A, W-C & E/MIN (OCUVITE ORAL) Take by mouth.      biotin 1 mg Cap Take by mouth.      cetirizine (ZYRTEC) 10 MG tablet Take 10 mg by mouth once daily.      diphenhydrAMINE (BENADRYL) 50 MG capsule Take 50 mg by mouth nightly as needed for Itching.      ibuprofen (ADVIL,MOTRIN) 600 MG tablet Take 1 tablet (600 mg total) by mouth every 6 (six) hours as needed for Pain. 30 tablet 1    krill oil 500 mg Cap Take by mouth once daily.      LIALDA 1.2 gram TbEC 2.4 g daily with breakfast.       lisinopril 10 MG tablet Take 10 mg by mouth once daily.      loratadine (CLARITIN) 10 mg tablet Take 10 mg by mouth once daily.      omeprazole (PRILOSEC) 20 MG capsule 20 mg once daily.       pravastatin (PRAVACHOL) 40 MG tablet       simethicone (GAS-X ORAL) Take by mouth  "once daily.       [DISCONTINUED] cefpodoxime (VANTIN) 200 MG tablet Take 2 tablets (400 mg total) by mouth every 12 (twelve) hours. 28 tablet 0    [DISCONTINUED] metroNIDAZOLE (FLAGYL) 500 MG tablet Take 1 tablet (500 mg total) by mouth every 8 (eight) hours. 45 tablet 0    acetaminophen (TYLENOL) 650 MG TbSR Take 650 mg by mouth as needed.      [DISCONTINUED] amoxicillin-clavulanate 875-125mg (AUGMENTIN) 875-125 mg per tablet Take 1 tablet by mouth 2 (two) times daily. 14 tablet 0     No current facility-administered medications on file prior to visit.          PE  Vitals:    04/09/19 0946   BP: 130/80   Weight: 110.3 kg (243 lb 2.7 oz)   Height: 5' 3" (1.6 m)   PainSc: 0-No pain     Lung:clear to auscultation and percussion, no chest deformities noted  Heart:RRR, normal S1 & S2, no murmurs, rubs, or gallops, 2+ peripheral pulses  Abdomen: BS normal.  Abdomen soft, non-tender.  No masses or organomegaly  Incision: healing well.  Extremities: normal.  Vagina: small amount of serous drainage noted cuff with v-loc sutures noted, midline area of superficial cuff separation probed with q tip cuff intact. No mesh erosion     POP-Q:     Aa                            -2 Ba                            -2 C                         -5   GH                           3 PB                            2 TVL                     8   Ap                            -2 Bp                            -2 D                         n/a             Impression: post operative infection, cuff abscess     Plan:   Follow up visit in 2 wks   Awaiting repeat CT scan  Spoke with Dr. Hill will continue with ABX until she is seen next week, new rx placed. Discussed cost to patient, her office will see if a PA is required to follow up with the patient.   Spoke with Dr. Russell will proceed with visits every other week and alternate between my office and her office.   Finds and plans reviewed with patient  Call and Ed precautions given.     "

## 2019-04-09 NOTE — LETTER
April 9, 2019        Arvind Russell MD  [address]  [address]  [facility] [address]             [facility]   [address] [address]  Phone: [phone]   Patient: Jenny Caro   MR Number: [id number]   YOB: 1955   Date of Visit: 4/9/2019       Dear Dr. Russell:    I saw Jenny Caro for post opeartive follow up.  Attached you will find relevant portions of my assessment and plan of care.    If you have questions, please do not hesitate to call me. I look forward to following Jenny Caro along with you.    Sincerely,      Rui Lozano, MD Fatou Villegas MD    Enclosure

## 2019-04-09 NOTE — TELEPHONE ENCOUNTER
Was notified by Dr Lozano that the patient tried to fill cefpodoxime, but didn't  cause cost about $200. Spoke with pharmacist at [facility] and changed to an alternative 3rd generation po cephalosporin covered by her plan ceftin 500mg po bid x 2 weeks.

## 2019-04-10 ENCOUNTER — TELEPHONE (OUTPATIENT)
Dept: OBSTETRICS AND GYNECOLOGY | Facility: CLINIC | Age: 64
End: 2019-04-10

## 2019-04-10 NOTE — TELEPHONE ENCOUNTER
Needs post-op appt. Sometime next week per .   I left a message on her cell stating this.   I believe she has another appt. On 18th

## 2019-04-16 ENCOUNTER — OFFICE VISIT (OUTPATIENT)
Dept: INFECTIOUS DISEASES | Facility: CLINIC | Age: 64
End: 2019-04-16
Payer: COMMERCIAL

## 2019-04-16 VITALS
TEMPERATURE: 98 F | HEART RATE: 85 BPM | BODY MASS INDEX: 43.05 KG/M2 | HEIGHT: 63 IN | WEIGHT: 243 LBS | SYSTOLIC BLOOD PRESSURE: 147 MMHG | DIASTOLIC BLOOD PRESSURE: 92 MMHG

## 2019-04-16 DIAGNOSIS — T81.49XA POSTOPERATIVE ABSCESS: ICD-10-CM

## 2019-04-16 DIAGNOSIS — Z98.890 S/P ROBOT-ASSISTED SURGICAL PROCEDURE: ICD-10-CM

## 2019-04-16 DIAGNOSIS — K51.919 ULCERATIVE COLITIS WITH COMPLICATION, UNSPECIFIED LOCATION: Chronic | ICD-10-CM

## 2019-04-16 DIAGNOSIS — L02.91 ABSCESS: Primary | ICD-10-CM

## 2019-04-16 PROCEDURE — 3080F DIAST BP >= 90 MM HG: CPT | Mod: CPTII,S$GLB,, | Performed by: INTERNAL MEDICINE

## 2019-04-16 PROCEDURE — 99999 PR PBB SHADOW E&M-EST. PATIENT-LVL IV: CPT | Mod: PBBFAC,,, | Performed by: INTERNAL MEDICINE

## 2019-04-16 PROCEDURE — 3008F PR BODY MASS INDEX (BMI) DOCUMENTED: ICD-10-PCS | Mod: CPTII,S$GLB,, | Performed by: INTERNAL MEDICINE

## 2019-04-16 PROCEDURE — 99214 OFFICE O/P EST MOD 30 MIN: CPT | Mod: S$GLB,,, | Performed by: INTERNAL MEDICINE

## 2019-04-16 PROCEDURE — 99214 PR OFFICE/OUTPT VISIT, EST, LEVL IV, 30-39 MIN: ICD-10-PCS | Mod: S$GLB,,, | Performed by: INTERNAL MEDICINE

## 2019-04-16 PROCEDURE — 3080F PR MOST RECENT DIASTOLIC BLOOD PRESSURE >= 90 MM HG: ICD-10-PCS | Mod: CPTII,S$GLB,, | Performed by: INTERNAL MEDICINE

## 2019-04-16 PROCEDURE — 3008F BODY MASS INDEX DOCD: CPT | Mod: CPTII,S$GLB,, | Performed by: INTERNAL MEDICINE

## 2019-04-16 PROCEDURE — 3077F SYST BP >= 140 MM HG: CPT | Mod: CPTII,S$GLB,, | Performed by: INTERNAL MEDICINE

## 2019-04-16 PROCEDURE — 3077F PR MOST RECENT SYSTOLIC BLOOD PRESSURE >= 140 MM HG: ICD-10-PCS | Mod: CPTII,S$GLB,, | Performed by: INTERNAL MEDICINE

## 2019-04-16 PROCEDURE — 99999 PR PBB SHADOW E&M-EST. PATIENT-LVL IV: ICD-10-PCS | Mod: PBBFAC,,, | Performed by: INTERNAL MEDICINE

## 2019-04-16 RX ORDER — CEFDINIR 300 MG/1
300 CAPSULE ORAL EVERY 12 HOURS
Qty: 60 CAPSULE | Refills: 1 | Status: SHIPPED | OUTPATIENT
Start: 2019-04-16 | End: 2019-04-16

## 2019-04-16 RX ORDER — METRONIDAZOLE 500 MG/1
500 TABLET ORAL EVERY 8 HOURS
Qty: 90 TABLET | Refills: 1 | Status: SHIPPED | OUTPATIENT
Start: 2019-04-16 | End: 2019-04-25 | Stop reason: SDUPTHER

## 2019-04-16 NOTE — PROGRESS NOTES
"INFECTIOUS DISEASE CLINIC  04/16/2019     Subjective:      Chief Complaint: hospital discharge follow-up    History of Present Illness:    Patient Jenny Caro is a 63 y.o. female with UC on mesalamine who presents today for hospital discharge follow-up.  s/p robotic assisted hysterectomy/sacro-colpopexy/posterior colporraphy with vaginal mesh placement on 3/13/19 admitted 4/1- 4/3 with fever and vaginal discharge. Endorsed symptoms of slimy discharge with wiping, then felt achy and spiked fever. Prior to arrival, was given augmentin with reported  defervescence of fever. Metronidazole was later added, but brown drainage persisted. Went to routine gyn appt and noted that she felt a "gush" of brown drainage prior to pelvic exam being done. Was direct admitted for further management.     CT showed:  4.6 cm mixed density collection concerning for abscess formation within the right adnexa abutting the sigmoid colon with colonic involvement not entirely excluded.  There is surrounding mesenteric haziness in fat stranding.     IR was consulted for drainage, but anatomically not possible     Of note, reports allergy to bactrim and also cipro-- throat tightness, denies swelling.     Drainage was cultured and grew:   Aerobic culture [[id number]]  Collected: 04/02/19 0003   Order Status: Completed Specimen: Abscess from Perineum Updated: 04/04/19 1301    Aerobic Bacterial Culture --    MORGANELLA MORGANII   Moderate    Susceptibility      Morganella morganii     CULTURE, AEROBIC  (SPECIFY SOURCE)     Amp/Sulbactam >16/8 mcg/mL Resistant     Cefepime <=8 mcg/mL Sensitive     Ceftriaxone <=8 mcg/mL Sensitive     Ciprofloxacin <=1 mcg/mL Sensitive     Ertapenem <=2 mcg/mL Sensitive     Gentamicin <=4 mcg/mL Sensitive     Levofloxacin <=2 mcg/mL Sensitive     Meropenem <=4 mcg/mL Sensitive     Piperacillin/Tazo <=16 mcg/mL Sensitive     Tobramycin <=4 mcg/mL Sensitive     Trimeth/Sulfa <=2/38 mcg/mL Sensitive            Linear " View         Culture, Anaerobe [[id number]] Collected: 03/29/19 2009   Order Status: Completed Specimen: Vagina from Groin Updated: 04/03/19 1101    Anaerobic Culture --    PREVOTELLA (B.) DISIENS   Moderate     Anaerobic Culture --    PREVOTELLA TIMONENSIS   Moderate    Aerobic culture [[id number]]  Collected: 03/29/19 2009   Order Status: Completed Specimen: Vagina from Groin Updated: 04/02/19 0730    Aerobic Bacterial Culture --    ESCHERICHIA COLI   Moderate   No other significant isolate    Susceptibility      Escherichia coli     CULTURE, AEROBIC  (SPECIFY SOURCE)     Amox/K Clav'ate <=8/4 mcg/mL Sensitive     Amp/Sulbactam <=8/4 mcg/mL Sensitive     Ampicillin <=8 mcg/mL Sensitive     Cefazolin <=8 mcg/mL Sensitive     Cefepime <=8 mcg/mL Sensitive     Ceftriaxone <=8 mcg/mL Sensitive     Ciprofloxacin <=1 mcg/mL Sensitive     Ertapenem <=2 mcg/mL Sensitive     Gentamicin <=4 mcg/mL Sensitive     Levofloxacin <=2 mcg/mL Sensitive     Meropenem <=4 mcg/mL Sensitive     Piperacillin/Tazo <=16 mcg/mL Sensitive     Tetracycline <=4 mcg/mL Sensitive     Tobramycin <=4 mcg/mL Sensitive     Trimeth/Sulfa <=2/38 mcg/mL Sensitive              Has been on cefpodoxime and metronidazole for about 10 days now (adjusted outpatient based on growth on cultures)    CT scan was ordered, scheduled for yesterday-- have faxed report. Diameter 3.5 x 8mm.     pt endorses compliance with abx. Denies missed doses. The cefpodoxime is expensive (reports $200/wk). Picked up med from [email or web address] [facility], where I had changed it to formulary alternative.denies fever/chills or abdominal pain. Vaginal drainage decreased. Says at last pelvic exam, inflammation decreasing    Spoke with patient's GYN surgeon about possibility of mesh involvement. She writes...     On CT, the abscess appears to be at the R adnexa, just R of sigmoid  The sacral colpopexy typically attaches a piece of polypropylene mesh to the vaginal cuff (top of vagina) and  is anchored to a ligament along the sacral promontory.  It does lie the right of the sigmoid colon; so, the abscess could be around the mesh.   Per imaging, it's difficult to tell 100%, though.  I'm hoping that most of the abscess content was able to drain from the pelvis out the vagina, as the vagina open/draining last week and that, with a course of antibiotics, we can calm things down.            Review of Symptoms:  Constitutional: Denies fevers, chills, or weakness.  ENT: Denies dysphagia, nasal discharge, ear pain or discharge.  Cardiovascular: Denies chest pain, palpitations, orthopnea, or claudication.  Respiratory: Denies shortness of breath, cough, hemoptysis, or wheezing.  GI: Denies nausea/vomitting, hematochezia, melena, abd pain, or changes in appetite.  : Denies dysuria, incontinence, or hematuria.  Musculoskeletal: Denies joint pain or myalgias.  Skin/breast: Denies rashes, lumps, lesions, or discharge.  Neurologic: Denies headache, dizziness, vertigo, or paresthesias.    Past Medical History:   Diagnosis Date    Abnormal Pap smear of cervix 2009    LEEP 2009, mild dysplasia normal paps since    Arthritis     ankles  born with club feet    GERD (gastroesophageal reflux disease)     HPV in female     Hyperlipidemia     Hypertension     Ulcerative colitis        Past Surgical History:   Procedure Laterality Date    CERVICAL BIOPSY  W/ LOOP ELECTRODE EXCISION  2009    COLPORRHAPHY, POSTERIOR N/A 3/13/2019    [facility] OR    CYSTOSCOPY N/A 3/13/2019    [facility] OR    DILATION AND CURETTAGE, UTERUS  11/20/2018    [facility] at [facility] OR    gastric sleeve  08/2015    HYSTEROSCOPY; TRUCLEAR RESECTION OF UTERINE POLYP N/A 11/20/2018    [facility] at [facility] OR    LAPAROSCOPIC GASTRIC BANDING  2006    ROBOTIC HYSTERECTOMY N/A 3/13/2019    [facility] OR     "ROBOTIC SACROCOLPOPEXY, ABDOMEN N/A 3/13/2019    [facility] OR    ROBOTIC SALPINGO-OOPHORECTOMY Bilateral 3/13/2019    [facility] at [facility] OR       Family History   Problem Relation Age of Onset    Diabetes Mother     Heart disease Father     Stroke Sister     Diabetes Sister     Breast cancer Neg Hx        Social History     Socioeconomic History    Marital status:      Spouse name: Not on file    Number of children: Not on file    Years of education: Not on file    Highest education level: Not on file   Occupational History    Occupation: retired   Social Needs    Financial resource strain: Not on file    Food insecurity:     Worry: Not on file     Inability: Not on file    Transportation needs:     Medical: Not on file     Non-medical: Not on file   Tobacco Use    Smoking status: Never Smoker    Smokeless tobacco: Never Used   Substance and Sexual Activity    Alcohol use: No    Drug use: No    Sexual activity: Yes     Partners: Male     Birth control/protection: Post-menopausal   Lifestyle    Physical activity:     Days per week: Not on file     Minutes per session: Not on file    Stress: Not on file   Relationships    Social connections:     Talks on phone: Not on file     Gets together: Not on file     Attends Confucianist service: Not on file     Active member of club or organization: Not on file     Attends meetings of clubs or organizations: Not on file     Relationship status: Not on file   Other Topics Concern    Not on file   Social History Narrative    Not on file       Review of patient's allergies indicates:   Allergen Reactions    Ciprofloxacin Other (See Comments)     Throat tightening    Bactrim [sulfamethoxazole-trimethoprim] Itching         Objective:   BP (!) 147/92 (BP Location: Left arm, Patient Position: Sitting)   Pulse 85   Temp 98 °F (36.7 °C) (Oral)   Ht 5' 3" (1.6 m)   Wt 110.2 kg (243 lb)   BMI 43.05 " kg/m²     General: Afebrile, alert, comfortable, no acute distress. +anxious  HEENT: SEBASTIAN. EOMI, no scleral icterus.   Pulmonary: Non labored,clear to auscultation A/P/L. No wheezing, crackles, or rhonchi.  Cardiac: normal S1 & S2 w/o rubs/murmurs/gallops.   Abdominal: Non-tender, non-distended. No guarding or rebound tenderness  Extremities: Moves all extremities x 4. No peripheral edema. 2+ pulses.  Skin: No jaundice, rashes, or visible lesions.   Neurological:  Alert and oriented x 4.     Labs:  Glucose   Date Value Ref Range Status   04/03/2019 94 70 - 110 mg/dL Final   04/01/2019 86 70 - 110 mg/dL Final   03/29/2019 180 (H) 70 - 110 mg/dL Final     Calcium   Date Value Ref Range Status   04/03/2019 9.3 8.7 - 10.5 mg/dL Final   04/01/2019 9.5 8.7 - 10.5 mg/dL Final   03/29/2019 9.4 8.7 - 10.5 mg/dL Final     Albumin   Date Value Ref Range Status   03/29/2019 3.1 (L) 3.5 - 5.2 g/dL Final     Total Protein   Date Value Ref Range Status   03/29/2019 7.4 6.0 - 8.4 g/dL Final     Sodium   Date Value Ref Range Status   04/03/2019 141 136 - 145 mmol/L Final   04/01/2019 140 136 - 145 mmol/L Final   03/29/2019 138 136 - 145 mmol/L Final     Potassium   Date Value Ref Range Status   04/03/2019 4.4 3.5 - 5.1 mmol/L Final   04/01/2019 4.9 3.5 - 5.1 mmol/L Final   03/29/2019 3.9 3.5 - 5.1 mmol/L Final     CO2   Date Value Ref Range Status   04/03/2019 27 23 - 29 mmol/L Final   04/01/2019 26 23 - 29 mmol/L Final   03/29/2019 26 23 - 29 mmol/L Final     Chloride   Date Value Ref Range Status   04/03/2019 106 95 - 110 mmol/L Final   04/01/2019 106 95 - 110 mmol/L Final   03/29/2019 104 95 - 110 mmol/L Final     BUN, Bld   Date Value Ref Range Status   04/03/2019 17 8 - 23 mg/dL Final   04/01/2019 19 8 - 23 mg/dL Final   03/29/2019 14 8 - 23 mg/dL Final     Creatinine   Date Value Ref Range Status   04/03/2019 0.8 0.5 - 1.4 mg/dL Final   04/01/2019 0.8 0.5 - 1.4 mg/dL Final   03/29/2019 0.8 0.5 - 1.4 mg/dL Final     Alkaline  Phosphatase   Date Value Ref Range Status   03/29/2019 86 55 - 135 U/L Final     ALT   Date Value Ref Range Status   03/29/2019 13 10 - 44 U/L Final     AST   Date Value Ref Range Status   03/29/2019 10 10 - 40 U/L Final     Total Bilirubin   Date Value Ref Range Status   03/29/2019 0.5 0.1 - 1.0 mg/dL Final     Comment:     For infants and newborns, interpretation of results should be based  on gestational age, weight and in agreement with clinical  observations.  Premature Infant recommended reference ranges:  Up to 24 hours.............<8.0 mg/dL  Up to 48 hours............<12.0 mg/dL  3-5 days..................<15.0 mg/dL  6-29 days.................<15.0 mg/dL       WBC   Date Value Ref Range Status   04/01/2019 6.77 3.90 - 12.70 K/uL Final   03/29/2019 14.17 (H) 3.90 - 12.70 K/uL Final   03/14/2019 9.51 3.90 - 12.70 K/uL Final     Hemoglobin   Date Value Ref Range Status   04/01/2019 11.3 (L) 12.0 - 16.0 g/dL Final   03/29/2019 12.1 12.0 - 16.0 g/dL Final   03/14/2019 11.1 (L) 12.0 - 16.0 g/dL Final     Hematocrit   Date Value Ref Range Status   04/01/2019 35.4 (L) 37.0 - 48.5 % Final   03/29/2019 37.6 37.0 - 48.5 % Final   03/14/2019 35.5 (L) 37.0 - 48.5 % Final     MCV   Date Value Ref Range Status   04/01/2019 94 82 - 98 fL Final   03/29/2019 93 82 - 98 fL Final   03/14/2019 97 82 - 98 fL Final     Platelets   Date Value Ref Range Status   04/01/2019 341 150 - 350 K/uL Final   03/29/2019 298 150 - 350 K/uL Final   03/14/2019 250 150 - 350 K/uL Final     No results found for: CHOL  No results found for: HDL  No results found for: LDLCALC  No results found for: TRIG  No results found for: CHOLHDL  No results found for: RPR  No results found for: QUANTIFERON    Medications:  Current Outpatient Medications on File Prior to Visit   Medication Sig Dispense Refill    acetaminophen (TYLENOL) 650 MG TbSR Take 650 mg by mouth as needed.      ascorbic acid, vitamin C, (VITAMIN C) 100 MG tablet Take 100 mg by mouth  once daily.      aspirin (ECOTRIN) 81 MG EC tablet Take 81 mg by mouth once daily.      BETA-CAROTENE,A, W-C & E/MIN (OCUVITE ORAL) Take by mouth.      biotin 1 mg Cap Take by mouth.      cefpodoxime (VANTIN) 200 MG tablet Take 2 tablets (400 mg total) by mouth every 12 (twelve) hours. 28 tablet 0    cetirizine (ZYRTEC) 10 MG tablet Take 10 mg by mouth once daily.      diphenhydrAMINE (BENADRYL) 50 MG capsule Take 50 mg by mouth nightly as needed for Itching.      ibuprofen (ADVIL,MOTRIN) 600 MG tablet Take 1 tablet (600 mg total) by mouth every 6 (six) hours as needed for Pain. 30 tablet 1    krill oil 500 mg Cap Take by mouth once daily.      LIALDA 1.2 gram TbEC 2.4 g daily with breakfast.       lisinopril 10 MG tablet Take 10 mg by mouth once daily.      loratadine (CLARITIN) 10 mg tablet Take 10 mg by mouth once daily.      metroNIDAZOLE (FLAGYL) 500 MG tablet Take 1 tablet (500 mg total) by mouth every 8 (eight) hours. for 7 days 21 tablet 0    omeprazole (PRILOSEC) 20 MG capsule 20 mg once daily.       pravastatin (PRAVACHOL) 40 MG tablet       simethicone (GAS-X ORAL) Take by mouth once daily.        No current facility-administered medications on file prior to visit.        Antibiotics:   Antibiotics (From admission, onward)    None          HIV: No components found for: HIV 1/2 AG/AB  Hepatitis C IgG: No components found for: HEPATITIS C  Syphilis: No results found for: RPR    Hepatitis A IgG: No components found for: HEPATITIS A IGG  Hepatitis Bc IgG: No components found for: HEPATITIS B CORE IGG  Hepatitis Bs IgG:  Quantiferon: No results found for: QUANTIFERON  VZV IgG: No components found for: VARICELLA IGG    No components found for: SEDIMENTATION RATE  No components found for: C-REACTIVE PROTEIN      Microbiology x 7d:   Microbiology Results (last 7 days)     ** No results found for the last 168 hours. **            There is no immunization history on file for this  patient.      Assessment:     Post-op abscess (vaginal cultures of spontaneous drainage with e.coli, morganella, anerobes)  S/p hysterectomy    Plan:     No signs of systemic infection. Abscess decreasing with cefpoxime/metronidazole, but still 3.5cm in size.  IR could not drain the original abscess given anatomical location. Had planned to change to cefdinir 300 po bid and metronidazole 500 q8 for 6 weeks total of abx, but spoke with ID pharmD who raises concern of inconstant concentrations with switch from cefpodoxime to cefdinir. Plan to continue current therapy for now. Pt to come into clinic next week for monitoring after taking cipro (she is unsure if allergy is real and doesn't report symptoms of anaphylaxis). If she tolerates, plan to change to po cipro/metronidazole x 6 weeks. If she doesn't, considering IV ceftriaxone/metro for better, more consistent drug levels. Plan to re-image at end of therapy. Trend ESR/cRP now and about ever 2 wks. Hopefully mesh isn't involved, but can not exclude this. Re-assuring that pt is currently asymptomatic from abscess. If fluid collection still present on repeat imaging after 6 wks of abx, raises possibility of mesh infection and at that point would discuss possible mesh removal vs long term suppressive abx.         Jesika Hill MD, MPH  Infectious Disease

## 2019-04-17 ENCOUNTER — TELEPHONE (OUTPATIENT)
Dept: INFECTIOUS DISEASES | Facility: HOSPITAL | Age: 64
End: 2019-04-17

## 2019-04-17 RX ORDER — CIPROFLOXACIN 750 MG/1
750 TABLET, FILM COATED ORAL ONCE
Qty: 1 TABLET | Refills: 0 | Status: SHIPPED | OUTPATIENT
Start: 2019-04-17 | End: 2019-04-17

## 2019-04-17 RX ORDER — CEFPODOXIME PROXETIL 200 MG/1
400 TABLET, FILM COATED ORAL EVERY 12 HOURS
Qty: 14 TABLET | Refills: 0 | Status: SHIPPED | OUTPATIENT
Start: 2019-04-17 | End: 2019-04-22 | Stop reason: SDUPTHER

## 2019-04-17 NOTE — TELEPHONE ENCOUNTER
Spoke with patient. Plan to treat for another week of cefpodoxime (over easter holiday). She is unsure if she has true allergy to cipro. She will  one dose of cipro and bring it with her to infusion center appointment for monitoring first dose of medication. She will call to make this appointment. If she tolerates, will plan to continue cipro/metronidazole for 6 week course. She is aware of newly issued FDA warning on vaginal mesh.

## 2019-04-17 NOTE — TELEPHONE ENCOUNTER
----- Message from Asaf Cash MA sent at 4/17/2019  8:37 AM CDT -----  Contact: john mcgraw    Please advise  ----- Message -----  From: Michelle Jenkins  Sent: 4/17/2019   8:28 AM  To: [facility] Staff    Returning your  About ordered med   But dont take till doctor talks with patient   Ph [phone]   Thanks

## 2019-04-18 ENCOUNTER — LAB VISIT (OUTPATIENT)
Dept: LAB | Facility: HOSPITAL | Age: 64
End: 2019-04-18
Attending: INTERNAL MEDICINE
Payer: COMMERCIAL

## 2019-04-18 ENCOUNTER — OFFICE VISIT (OUTPATIENT)
Dept: OBSTETRICS AND GYNECOLOGY | Facility: CLINIC | Age: 64
End: 2019-04-18
Payer: COMMERCIAL

## 2019-04-18 ENCOUNTER — TELEPHONE (OUTPATIENT)
Dept: INFECTIOUS DISEASES | Facility: CLINIC | Age: 64
End: 2019-04-18

## 2019-04-18 VITALS
BODY MASS INDEX: 43.36 KG/M2 | DIASTOLIC BLOOD PRESSURE: 78 MMHG | HEIGHT: 63 IN | WEIGHT: 244.69 LBS | SYSTOLIC BLOOD PRESSURE: 128 MMHG

## 2019-04-18 DIAGNOSIS — T81.49XA POSTOPERATIVE ABSCESS: ICD-10-CM

## 2019-04-18 DIAGNOSIS — L02.91 ABSCESS: ICD-10-CM

## 2019-04-18 DIAGNOSIS — N76.0 VAGINAL CUFF CELLULITIS: ICD-10-CM

## 2019-04-18 DIAGNOSIS — Z98.890 S/P ROBOT-ASSISTED SURGICAL PROCEDURE: Primary | ICD-10-CM

## 2019-04-18 LAB
ALBUMIN SERPL BCP-MCNC: 3.2 G/DL (ref 3.5–5.2)
ALP SERPL-CCNC: 65 U/L (ref 55–135)
ALT SERPL W/O P-5'-P-CCNC: 19 U/L (ref 10–44)
ANION GAP SERPL CALC-SCNC: 8 MMOL/L (ref 8–16)
AST SERPL-CCNC: 21 U/L (ref 10–40)
BASOPHILS # BLD AUTO: 0.03 K/UL (ref 0–0.2)
BASOPHILS NFR BLD: 0.4 % (ref 0–1.9)
BILIRUB SERPL-MCNC: 0.2 MG/DL (ref 0.1–1)
BUN SERPL-MCNC: 20 MG/DL (ref 8–23)
CALCIUM SERPL-MCNC: 9.6 MG/DL (ref 8.7–10.5)
CHLORIDE SERPL-SCNC: 105 MMOL/L (ref 95–110)
CO2 SERPL-SCNC: 28 MMOL/L (ref 23–29)
CREAT SERPL-MCNC: 0.7 MG/DL (ref 0.5–1.4)
CRP SERPL-MCNC: 3.8 MG/L (ref 0–8.2)
DIFFERENTIAL METHOD: ABNORMAL
EOSINOPHIL # BLD AUTO: 0.3 K/UL (ref 0–0.5)
EOSINOPHIL NFR BLD: 4.1 % (ref 0–8)
ERYTHROCYTE [DISTWIDTH] IN BLOOD BY AUTOMATED COUNT: 14.9 % (ref 11.5–14.5)
ERYTHROCYTE [SEDIMENTATION RATE] IN BLOOD BY WESTERGREN METHOD: 59 MM/HR (ref 0–36)
EST. GFR  (AFRICAN AMERICAN): >60 ML/MIN/1.73 M^2
EST. GFR  (NON AFRICAN AMERICAN): >60 ML/MIN/1.73 M^2
GLUCOSE SERPL-MCNC: 63 MG/DL (ref 70–110)
HCT VFR BLD AUTO: 39.8 % (ref 37–48.5)
HGB BLD-MCNC: 12.3 G/DL (ref 12–16)
IMM GRANULOCYTES # BLD AUTO: 0.01 K/UL (ref 0–0.04)
IMM GRANULOCYTES NFR BLD AUTO: 0.1 % (ref 0–0.5)
LYMPHOCYTES # BLD AUTO: 1.5 K/UL (ref 1–4.8)
LYMPHOCYTES NFR BLD: 22.5 % (ref 18–48)
MCH RBC QN AUTO: 29.4 PG (ref 27–31)
MCHC RBC AUTO-ENTMCNC: 30.9 G/DL (ref 32–36)
MCV RBC AUTO: 95 FL (ref 82–98)
MONOCYTES # BLD AUTO: 0.6 K/UL (ref 0.3–1)
MONOCYTES NFR BLD: 8.2 % (ref 4–15)
NEUTROPHILS # BLD AUTO: 4.4 K/UL (ref 1.8–7.7)
NEUTROPHILS NFR BLD: 64.7 % (ref 38–73)
NRBC BLD-RTO: 0 /100 WBC
PLATELET # BLD AUTO: 283 K/UL (ref 150–350)
PMV BLD AUTO: 10.9 FL (ref 9.2–12.9)
POTASSIUM SERPL-SCNC: 5.4 MMOL/L (ref 3.5–5.1)
PROT SERPL-MCNC: 6.9 G/DL (ref 6–8.4)
RBC # BLD AUTO: 4.19 M/UL (ref 4–5.4)
SODIUM SERPL-SCNC: 141 MMOL/L (ref 136–145)
WBC # BLD AUTO: 6.83 K/UL (ref 3.9–12.7)

## 2019-04-18 PROCEDURE — 99024 PR POST-OP FOLLOW-UP VISIT: ICD-10-PCS | Mod: S$GLB,,, | Performed by: OBSTETRICS & GYNECOLOGY

## 2019-04-18 PROCEDURE — 99999 PR PBB SHADOW E&M-EST. PATIENT-LVL IV: CPT | Mod: PBBFAC,,, | Performed by: OBSTETRICS & GYNECOLOGY

## 2019-04-18 PROCEDURE — 3008F BODY MASS INDEX DOCD: CPT | Mod: CPTII,S$GLB,, | Performed by: OBSTETRICS & GYNECOLOGY

## 2019-04-18 PROCEDURE — 36415 COLL VENOUS BLD VENIPUNCTURE: CPT | Mod: PO

## 2019-04-18 PROCEDURE — 99024 POSTOP FOLLOW-UP VISIT: CPT | Mod: S$GLB,,, | Performed by: OBSTETRICS & GYNECOLOGY

## 2019-04-18 PROCEDURE — 86140 C-REACTIVE PROTEIN: CPT

## 2019-04-18 PROCEDURE — 85652 RBC SED RATE AUTOMATED: CPT

## 2019-04-18 PROCEDURE — 3008F PR BODY MASS INDEX (BMI) DOCUMENTED: ICD-10-PCS | Mod: CPTII,S$GLB,, | Performed by: OBSTETRICS & GYNECOLOGY

## 2019-04-18 PROCEDURE — 80053 COMPREHEN METABOLIC PANEL: CPT

## 2019-04-18 PROCEDURE — 99999 PR PBB SHADOW E&M-EST. PATIENT-LVL IV: ICD-10-PCS | Mod: PBBFAC,,, | Performed by: OBSTETRICS & GYNECOLOGY

## 2019-04-18 PROCEDURE — 85025 COMPLETE CBC W/AUTO DIFF WBC: CPT

## 2019-04-18 NOTE — PROGRESS NOTES
Subjective:       Patient ID: Jenny Caro is a 63 y.o. female.    Chief Complaint:  Post-op Evaluation (one mnth out)      History of Present Illness  Jenny Caro is a 63 y.o. female status post: feels well overall, no temps since 3/28/201,vaginal drainage has resolved over the past week   Pain well controlled, tolerating PO and having normal BM  Saw ID and is still on abx. Abscess has decreased from 4.5cm to 3.5 cm per pt however I do not see this imaging in Epic  Patient concerned regarding mesh recall.  Asking specific somewhat mesh was placed. Reassurance given but I instructed her to follow up with these questions next week when she has her Urogynecology appointment.        3/13/19:  PROCEDURE:    1. Robotic assisted Sacrocolpopexy with permanent mesh ([employer] Ref: [id number] Lot# [id number])   2. Cystourethroscopy  3. Posterior colporrhaphy       GYN & OB History  No LMP recorded. Patient has had a hysterectomy.   Date of Last Pap: 2018    OB History    Para Term  AB Living   2 2 2     2   SAB TAB Ectopic Multiple Live Births           2      # Outcome Date GA Lbr Guero/2nd Weight Sex Delivery Anes PTL Lv   2 Term      Vag-Spont      1 Term      Vag-Spont          Review of Systems  Review of Systems   All other systems reviewed and are negative.       Objective:     Vitals:    19 0841   BP: 128/78       Physical Exam:   Constitutional: She is oriented to person, place, and time. She appears well-developed and well-nourished.    HENT:   Head: Normocephalic.       Pulmonary/Chest: Effort normal.          Genitourinary: Vagina normal. No vaginal discharge found.   Genitourinary Comments: Cuff intact No erythema No separation noted today            Musculoskeletal: Normal range of motion.       Neurological: She is alert and oriented to person, place, and time.    Skin: Skin is warm and dry.    Psychiatric: She has a normal mood and affect. Her behavior is normal.           Assessment/ Plan:          Jenny was seen today for post-op evaluation.    Diagnoses and all orders for this visit:    S/P robot-assisted lap hyst/BSO/sacrocolpoplexy and posterior repair    Vaginal cuff cellulitis    Postoperative abscess    Looking great. No fever No pain  Still on ABX   Will cont to follow weekly    No follow-ups on file.      Health Maintenance       Date Due Completion Date    Hepatitis C Screening 1955 ---    Lipid Panel 1955 ---    TETANUS VACCINE 07/28/1972 ---    Colonoscopy 07/28/2004 ---    Zoster Vaccine 07/28/2014 ---    Mammogram 11/05/2019 11/5/2018 (Done)    Override on 11/5/2018: Done

## 2019-04-18 NOTE — TELEPHONE ENCOUNTER
Drea:  On [address] [phone]   / pharmacy didn't have the medications correct when she went to  meds, says she needs   7 day supply of the Vantin /  Metronidazole  1 cipro pill  7 day supply of cipro (in case can take) sent into pharmacy.

## 2019-04-18 NOTE — TELEPHONE ENCOUNTER
cefpodoxime  200 MG tablet 14 tablet 0 4/17/2019 4/24/2019 --   Sig - Route: Take 2 tablets (400 mg total) by mouth every 12 (twelve) hours. for 7 days - Oral   Sent to pharmacy as: cefpodoxime (VANTIN) 200 MG tablet   Class: Normal   Order: [id number]   Date/Time Signed: 4/17/2019 12:27       E-Prescribing Status: Receipt confirmed by pharmacy (4/17/2019 12:27 PM CDT)     ciprofloxacin HCl  750 MG tablet 1 tablet 0 4/17/2019 4/17/2019 --   Sig - Route: Take 1 tablet (750 mg total) by mouth once. Patient to bring medication to clinic for monitoring for first dose for 1 dose - Oral   Sent to pharmacy as: ciprofloxacin HCl (CIPRO) 750 MG tablet   Class: Normal   Order: [id number]   Date/Time Signed: 4/17/2019 12:27       E-Prescribing Status: Receipt confirmed by pharmacy (4/17/2019 12:27 PM CDT)

## 2019-04-22 RX ORDER — CEFPODOXIME PROXETIL 200 MG/1
400 TABLET, FILM COATED ORAL EVERY 12 HOURS
Qty: 56 TABLET | Refills: 0 | Status: SHIPPED | OUTPATIENT
Start: 2019-04-22 | End: 2019-05-06

## 2019-04-22 NOTE — TELEPHONE ENCOUNTER
----- Message from Dorota Veras sent at 4/22/2019 12:56 PM CDT -----  Contact: patient  Called to get a refill of cefpodoxime (VANTIN) 200 MG tablet    Patient can be reached at [phone]      Thanks  KB

## 2019-04-25 ENCOUNTER — TELEPHONE (OUTPATIENT)
Dept: INFECTIOUS DISEASES | Facility: HOSPITAL | Age: 64
End: 2019-04-25

## 2019-04-25 RX ORDER — CIPROFLOXACIN 750 MG/1
750 TABLET, FILM COATED ORAL EVERY 12 HOURS
Qty: 60 TABLET | Refills: 1 | Status: SHIPPED | OUTPATIENT
Start: 2019-04-25 | End: 2019-05-17 | Stop reason: SDUPTHER

## 2019-04-25 RX ORDER — METRONIDAZOLE 500 MG/1
500 TABLET ORAL EVERY 8 HOURS
Qty: 90 TABLET | Refills: 1 | Status: SHIPPED | OUTPATIENT
Start: 2019-04-25 | End: 2019-05-17 | Stop reason: SDUPTHER

## 2019-04-25 NOTE — TELEPHONE ENCOUNTER
[id number] [place] [place] - [address] AT [facility] [address]    Patient asking approx how long will be taking the Cipro / metronidazole?

## 2019-04-25 NOTE — TELEPHONE ENCOUNTER
Pt has a reported possible allergy to cipro, so she brought drug to clinic and was observed for 1.25 hours with no reaction or complaints.     Change cefpodoxime to cipro and keep metronidazole (better bioavailability)    Duration likely 4-6 weeks based on clinical response

## 2019-05-08 ENCOUNTER — OFFICE VISIT (OUTPATIENT)
Dept: OBSTETRICS AND GYNECOLOGY | Facility: CLINIC | Age: 64
End: 2019-05-08
Attending: OBSTETRICS & GYNECOLOGY
Payer: COMMERCIAL

## 2019-05-08 ENCOUNTER — TELEPHONE (OUTPATIENT)
Dept: UROGYNECOLOGY | Facility: CLINIC | Age: 64
End: 2019-05-08

## 2019-05-08 VITALS
SYSTOLIC BLOOD PRESSURE: 122 MMHG | HEIGHT: 63 IN | WEIGHT: 244.69 LBS | DIASTOLIC BLOOD PRESSURE: 76 MMHG | BODY MASS INDEX: 43.36 KG/M2

## 2019-05-08 DIAGNOSIS — T81.49XA POSTOPERATIVE ABSCESS: ICD-10-CM

## 2019-05-08 DIAGNOSIS — Z98.890 S/P ROBOT-ASSISTED SURGICAL PROCEDURE: Primary | ICD-10-CM

## 2019-05-08 PROCEDURE — 99024 POSTOP FOLLOW-UP VISIT: CPT | Mod: S$GLB,,, | Performed by: OBSTETRICS & GYNECOLOGY

## 2019-05-08 PROCEDURE — 99024 PR POST-OP FOLLOW-UP VISIT: ICD-10-PCS | Mod: S$GLB,,, | Performed by: OBSTETRICS & GYNECOLOGY

## 2019-05-08 PROCEDURE — 99999 PR PBB SHADOW E&M-EST. PATIENT-LVL IV: CPT | Mod: PBBFAC,,, | Performed by: OBSTETRICS & GYNECOLOGY

## 2019-05-08 PROCEDURE — 99999 PR PBB SHADOW E&M-EST. PATIENT-LVL IV: ICD-10-PCS | Mod: PBBFAC,,, | Performed by: OBSTETRICS & GYNECOLOGY

## 2019-05-08 RX ORDER — IBUPROFEN 200 MG
200 TABLET ORAL EVERY 6 HOURS PRN
COMMUNITY

## 2019-05-08 NOTE — TELEPHONE ENCOUNTER
----- Message from Yesy Maloney MA sent at 5/8/2019  4:31 PM CDT -----  Please schedule follow up  ----- Message -----  From: Ramiro Anton  Sent: 5/8/2019   3:34 PM  To: Blake Braden Staff    PLEASE CALL PT SHE NEEDS TO SCHEDULE A FOLLOW UP APPT  [phone]

## 2019-05-09 NOTE — PROGRESS NOTES
Subjective:       Patient ID: Jenny Caro is a 63 y.o. female.    Chief Complaint:  Post-op Evaluation (8w  PO  DVH/BSO on 3-13-19)      History of Present Illness  63-year-old now 8 weeks out post procedure of Da Basia Hysterectomy/ BSO/sacral colpopexy with postop abscess.  Currently on Cipro and Flagyl.  Patient seen by ID and will be on antibiotics another 2 weeks.  She has scheduled a CT scan on .  Overall patient feeling well.  Denies fever chills nausea vomiting.  Has resumed fairly normal activity.  Overall feeling very well. Appetite is good and no problems with bowel movements.  3/13/19:  PROCEDURE:    1. Robotic assisted Sacrocolpopexy with permanent mesh ([facility]ila Ref: [id number] Lot# [id number])   2. Cystourethroscopy  3. Posterior colporrhaphy     GYN & OB History  No LMP recorded. Patient has had a hysterectomy.   Date of Last Pap: 2018    OB History    Para Term  AB Living   2 2 2     2   SAB TAB Ectopic Multiple Live Births           2      # Outcome Date GA Lbr Guero/2nd Weight Sex Delivery Anes PTL Lv   2 Term  40w0d   M Vag-Spont EPI  DAVID   1 Term  40w0d   F Vag-Spont EPI  DAVID       Review of Systems  Review of Systems   Constitutional: Negative.    HENT: Negative.    Respiratory: Negative.    Cardiovascular: Positive for palpitations. Negative for chest pain.   Gastrointestinal: Negative.         Objective:     Vitals:    19 1329   BP: 122/76       Physical Exam:   Constitutional: She is oriented to person, place, and time. She appears well-developed and well-nourished.    HENT:   Head: Normocephalic.       Pulmonary/Chest: Effort normal.          Genitourinary: Vagina normal. Pelvic exam was performed with patient supine. Right adnexum displays no mass and no tenderness. Left adnexum displays no mass and no tenderness. No erythema or tenderness in the vagina. No vaginal discharge found. Vaginal cuff normal.  Genitourinary Comments: Cuff intact  small amout suture material seen at mid cuff but overall healing well and cuff intact           Musculoskeletal: Normal range of motion.       Neurological: She is alert and oriented to person, place, and time.    Skin: Skin is warm and dry.    Psychiatric: She has a normal mood and affect. Her behavior is normal.          Assessment/ Plan:          Jenny was seen today for post-op evaluation.    Diagnoses and all orders for this visit:    S/P robot-assisted lap hyst/BSO/sacrocolpoplexy and posterior repair    Postoperative abscess     Patient doing very well but is is still on antibiotics for another 2 weeks per Infectious Disease..  Has CT scan scheduled at the end of the month.  Patient also see Dr. Mendes the 8th and the month for final clearance.  Recommend to continue pelvic rest until that appointment but otherwise may resume normal activity.    Patient with occasional heart palpitations.  She has had this in the past and has seen a cardiologist.  I encouraged her to be sure to follow up with her cardiologist ASAP.  Patient does report that  this occurs sporadically in intermittently.    Follow up in about 3 weeks (around 5/29/2019) for For CT scan and to see Dr. Saravia for final clearance..      Health Maintenance       Date Due Completion Date    Hepatitis C Screening 1955 ---    Lipid Panel 1955 ---    TETANUS VACCINE 07/28/1972 ---    Shingles Vaccine (1 of 2) 07/28/2004 ---    Influenza Vaccine 08/01/2019 10/29/2018    Mammogram 11/05/2019 11/5/2018 (Done)    Override on 11/5/2018: Done    Colonoscopy 04/01/2023 4/1/2013 (Done)    Override on 4/1/2013: Done

## 2019-05-17 ENCOUNTER — TELEPHONE (OUTPATIENT)
Dept: INFECTIOUS DISEASES | Facility: CLINIC | Age: 64
End: 2019-05-17

## 2019-05-17 RX ORDER — METRONIDAZOLE 500 MG/1
500 TABLET ORAL EVERY 8 HOURS
Qty: 90 TABLET | Refills: 1 | Status: SHIPPED | OUTPATIENT
Start: 2019-05-17 | End: 2019-05-17 | Stop reason: SDUPTHER

## 2019-05-17 RX ORDER — METRONIDAZOLE 500 MG/1
500 TABLET ORAL EVERY 8 HOURS
Qty: 90 TABLET | Refills: 1 | Status: SHIPPED | OUTPATIENT
Start: 2019-05-17 | End: 2019-06-16

## 2019-05-17 RX ORDER — CIPROFLOXACIN 750 MG/1
750 TABLET, FILM COATED ORAL EVERY 12 HOURS
Qty: 60 TABLET | Refills: 1 | Status: SHIPPED | OUTPATIENT
Start: 2019-05-17 | End: 2019-10-16 | Stop reason: ALTCHOICE

## 2019-05-17 RX ORDER — CIPROFLOXACIN 750 MG/1
750 TABLET, FILM COATED ORAL EVERY 12 HOURS
Qty: 60 TABLET | Refills: 1 | Status: SHIPPED | OUTPATIENT
Start: 2019-05-17 | End: 2019-05-17 | Stop reason: SDUPTHER

## 2019-05-17 NOTE — TELEPHONE ENCOUNTER
Returned call-- pt wants refill on cipro and metronidazole and ct scan scheduled. Doesn't want to go back to diagnostic imaging, prefer [facility] in the morning. Will ask medical assistant to schedule scan. Sent refill to linda per pt request

## 2019-05-17 NOTE — TELEPHONE ENCOUNTER
Returned patient call -- left voicemail on her cell.     Continue cipro/metronidazole. Plan to repeat CT 6 weeks from 4/25 (since she has been on current abx) -- about 6/6/19-- to make sure abscess resolving.     Have spoken with GYN regarding if mesh involved, and it sounds like a possibility (response below)    On CT, the abscess appears to be at the R adnexa, just R of sigmoid  The sacral colpopexy typically attaches a piece of polypropylene mesh to the vaginal cuff (top of vagina) and is anchored to a ligament along the sacral promontory.  It does lie the right of the sigmoid colon; so, the abscess could be around the mesh.   Per imaging, it's difficult to tell 100%, though.      If the mesh is involved, then the only way to get rid of infection is with mesh removal. Spoke with micro lab and the morganella and ecoli are sensitive to tetracyclines, so will consider long term suppression with doxycyline if mesh is not removed.     Have requested ID f/u to review repeat imaging

## 2019-06-04 ENCOUNTER — HOSPITAL ENCOUNTER (OUTPATIENT)
Dept: RADIOLOGY | Facility: HOSPITAL | Age: 64
Discharge: HOME | End: 2019-06-04
Attending: INTERNAL MEDICINE
Payer: COMMERCIAL

## 2019-06-04 DIAGNOSIS — L02.91 ABSCESS: ICD-10-CM

## 2019-06-04 LAB
CREAT SERPL-MCNC: 0.7 MG/DL (ref 0.5–1.4)
SAMPLE: NORMAL

## 2019-06-04 PROCEDURE — 74177 CT ABDOMEN PELVIS WITH CONTRAST: ICD-10-PCS | Mod: 26,,, | Performed by: RADIOLOGY

## 2019-06-04 PROCEDURE — 25500020 PHARM REV CODE 255: Performed by: INTERNAL MEDICINE

## 2019-06-04 PROCEDURE — 74177 CT ABD & PELVIS W/CONTRAST: CPT | Mod: 26,,, | Performed by: RADIOLOGY

## 2019-06-04 PROCEDURE — 74177 CT ABD & PELVIS W/CONTRAST: CPT | Mod: TC

## 2019-06-04 RX ADMIN — IOHEXOL 100 ML: 350 INJECTION, SOLUTION INTRAVENOUS at 09:06

## 2019-06-05 ENCOUNTER — TELEPHONE (OUTPATIENT)
Dept: UROGYNECOLOGY | Facility: CLINIC | Age: 64
End: 2019-06-05

## 2019-06-05 DIAGNOSIS — R93.41 ABNORMAL CT SCAN, BLADDER: Primary | ICD-10-CM

## 2019-06-05 NOTE — TELEPHONE ENCOUNTER
----- Message from Jesika Hill MD sent at 6/4/2019  3:44 PM CDT -----  Just wanted to update you on results of her follow up CT scan. Abscess resolved, but possible urinary fistula? I have an appt with her next week. Tentatively planning on stopping antibiotics at that time unless you have any concerns. Thanks, Jesika Infectious disease    ----- Message -----  From: Roxanna, Rad Results In  Sent: 6/4/2019  10:30 AM  To: Jesika Hill MD

## 2019-06-05 NOTE — TELEPHONE ENCOUNTER
Called patient to review the CT findings, left message to call the office. ? Punctate lesion of the anterior bladder. Check urine culture, Office cysto ordered.

## 2019-06-07 ENCOUNTER — TELEPHONE (OUTPATIENT)
Dept: INFECTIOUS DISEASES | Facility: HOSPITAL | Age: 64
End: 2019-06-07

## 2019-06-07 NOTE — TELEPHONE ENCOUNTER
Called patient. Abscess resolved. inflammatory markers normalized. Stop antibiotics. F/u with dr Lozano for cystoscopy (?urinary fistula). She will reach out to her office to schedule.

## 2019-06-12 ENCOUNTER — OFFICE VISIT (OUTPATIENT)
Dept: UROGYNECOLOGY | Facility: CLINIC | Age: 64
End: 2019-06-12
Payer: COMMERCIAL

## 2019-06-12 VITALS
DIASTOLIC BLOOD PRESSURE: 70 MMHG | SYSTOLIC BLOOD PRESSURE: 110 MMHG | BODY MASS INDEX: 42.58 KG/M2 | HEIGHT: 63 IN | WEIGHT: 240.31 LBS

## 2019-06-12 DIAGNOSIS — Z98.890 POST-OPERATIVE STATE: Primary | ICD-10-CM

## 2019-06-12 PROCEDURE — 99024 POSTOP FOLLOW-UP VISIT: CPT | Mod: S$GLB,,, | Performed by: OBSTETRICS & GYNECOLOGY

## 2019-06-12 PROCEDURE — 99999 PR PBB SHADOW E&M-EST. PATIENT-LVL III: CPT | Mod: PBBFAC,,, | Performed by: OBSTETRICS & GYNECOLOGY

## 2019-06-12 PROCEDURE — 99024 PR POST-OP FOLLOW-UP VISIT: ICD-10-PCS | Mod: S$GLB,,, | Performed by: OBSTETRICS & GYNECOLOGY

## 2019-06-12 PROCEDURE — 99999 PR PBB SHADOW E&M-EST. PATIENT-LVL III: ICD-10-PCS | Mod: PBBFAC,,, | Performed by: OBSTETRICS & GYNECOLOGY

## 2019-06-12 RX ORDER — TRAMADOL HYDROCHLORIDE 50 MG/1
TABLET ORAL
Refills: 0 | COMMUNITY
Start: 2019-05-16 | End: 2019-06-12 | Stop reason: ALTCHOICE

## 2019-06-12 RX ORDER — MELOXICAM 15 MG/1
TABLET ORAL
Refills: 4 | COMMUNITY
Start: 2019-05-16 | End: 2019-10-16 | Stop reason: ALTCHOICE

## 2019-06-12 RX ORDER — METOPROLOL SUCCINATE 25 MG/1
TABLET, EXTENDED RELEASE ORAL
Refills: 3 | COMMUNITY
Start: 2019-06-04 | End: 2023-01-05 | Stop reason: SDUPTHER

## 2019-07-17 ENCOUNTER — TELEPHONE (OUTPATIENT)
Dept: OBSTETRICS AND GYNECOLOGY | Facility: CLINIC | Age: 64
End: 2019-07-17

## 2019-07-17 NOTE — TELEPHONE ENCOUNTER
Pt returned Alexa's phone call. I gave the pt the message about 's PCP recommendation. Pt said she does not want a dr who is located at the hospital because it is a lot of walking and she will be taking someone who is in their 90's with a walker. I gave the pt Dr.Mary Vinson's info also.

## 2019-07-17 NOTE — TELEPHONE ENCOUNTER
Dr. Russell pt called asking for Dr. Russell to  recommend a general PCP at [facility]. Please advise.

## 2019-10-10 ENCOUNTER — OFFICE VISIT (OUTPATIENT)
Dept: OBSTETRICS AND GYNECOLOGY | Facility: CLINIC | Age: 64
End: 2019-10-10
Payer: MEDICARE

## 2019-10-10 VITALS
WEIGHT: 246.94 LBS | HEIGHT: 63 IN | BODY MASS INDEX: 43.75 KG/M2 | SYSTOLIC BLOOD PRESSURE: 110 MMHG | DIASTOLIC BLOOD PRESSURE: 78 MMHG

## 2019-10-10 DIAGNOSIS — Z12.31 BREAST CANCER SCREENING BY MAMMOGRAM: ICD-10-CM

## 2019-10-10 DIAGNOSIS — Z01.419 ENCOUNTER FOR GYNECOLOGICAL EXAMINATION: Primary | ICD-10-CM

## 2019-10-10 DIAGNOSIS — Z98.890 S/P ROBOT-ASSISTED SURGICAL PROCEDURE: ICD-10-CM

## 2019-10-10 PROCEDURE — G0101 CA SCREEN;PELVIC/BREAST EXAM: HCPCS | Mod: S$PBB,,, | Performed by: OBSTETRICS & GYNECOLOGY

## 2019-10-10 PROCEDURE — 99999 PR PBB SHADOW E&M-EST. PATIENT-LVL IV: CPT | Mod: PBBFAC,,, | Performed by: OBSTETRICS & GYNECOLOGY

## 2019-10-10 PROCEDURE — 99999 PR PBB SHADOW E&M-EST. PATIENT-LVL IV: ICD-10-PCS | Mod: PBBFAC,,, | Performed by: OBSTETRICS & GYNECOLOGY

## 2019-10-10 PROCEDURE — G0101 PR CA SCREEN;PELVIC/BREAST EXAM: ICD-10-PCS | Mod: S$PBB,,, | Performed by: OBSTETRICS & GYNECOLOGY

## 2019-10-10 PROCEDURE — 99214 OFFICE O/P EST MOD 30 MIN: CPT | Mod: PBBFAC,PN | Performed by: OBSTETRICS & GYNECOLOGY

## 2019-10-10 RX ORDER — MINERAL OIL
180 ENEMA (ML) RECTAL DAILY
COMMUNITY

## 2019-10-10 RX ORDER — OMEPRAZOLE 20 MG/1
CAPSULE, DELAYED RELEASE ORAL
COMMUNITY
Start: 2019-07-31 | End: 2020-04-08 | Stop reason: SDUPTHER

## 2019-10-10 RX ORDER — PRENATAL VIT CALC,IRON,FOLIC
TABLET ORAL
COMMUNITY
Start: 2013-05-13

## 2019-10-10 NOTE — PROGRESS NOTES
"[facility] Complaint Well woman exam:  Well Woman (Annual Exam, )      Jenny Caro is a 64 y.o. female  presents for a well woman exam.    She is established.  No complaints.  S/p 2019 Da Basia Hysterectomy/ BSO/sacral colpopexy with postop abscess.    Specifically, patient denies abnormal vaginal bleeding, discharge and odor", or "pelvic pain.     LMP: none  S/p Hyst  Last pap: s/p Hyst No pap needed   Last MMG:  2018 diagnostic imaging normal  Last colonoscopy:   Dr Carter      Current Outpatient Medications:     antiox #8/om3/dha/epa/lut/zeax (PRESERVISION AREDS 2, OMEGA-3, ORAL), Take 1 tablet by mouth once daily., Disp: , Rfl:     ascorbic acid, vitamin C, (VITAMIN C) 100 MG tablet, Take 100 mg by mouth once daily., Disp: , Rfl:     aspirin (ECOTRIN) 81 MG EC tablet, Take 81 mg by mouth once daily., Disp: , Rfl:     biotin 1 mg Cap, Take by mouth., Disp: , Rfl:     calcium citrate-vitamin D3 200 mg calcium -250 unit Tab, Take by mouth., Disp: , Rfl:     cetirizine (ZYRTEC) 10 MG tablet, Take 10 mg by mouth once daily., Disp: , Rfl:     fexofenadine (ALLEGRA) 180 MG tablet, Take 180 mg by mouth once daily., Disp: , Rfl:     ibuprofen (ADVIL) 200 MG tablet, Take 200 mg by mouth every 6 (six) hours as needed for Pain., Disp: , Rfl:     krill oil 500 mg Cap, Take by mouth once daily., Disp: , Rfl:     LIALDA 1.2 gram TbEC, 2.4 g daily with breakfast. , Disp: , Rfl:     lisinopril 10 MG tablet, Take 10 mg by mouth once daily., Disp: , Rfl:     metoprolol succinate (TOPROL-XL) 25 MG 24 hr tablet, TK 1  T PO QHS PRN HEART, Disp: , Rfl: 3    omeprazole (PRILOSEC) 20 MG capsule, , Disp: , Rfl:     simethicone (GAS-X ORAL), Take by mouth once daily. , Disp: , Rfl:     ciprofloxacin HCl (CIPRO) 750 MG tablet, Take 1 tablet (750 mg total) by mouth every 12 (twelve) hours., Disp: 60 tablet, Rfl: 1    meloxicam (MOBIC) 15 MG tablet, TK 1 T PO  QD, Disp: , Rfl: 4    Past Medical " History:   Diagnosis Date    Abnormal Pap smear of cervix 2009    LEEP , mild dysplasia normal paps since    Arthritis     ankles  born with club feet    GERD (gastroesophageal reflux disease)     HPV in female     Hyperlipidemia     Hypertension     Ulcerative colitis        Past Surgical History:   Procedure Laterality Date    CERVICAL BIOPSY  W/ LOOP ELECTRODE EXCISION      COLONOSCOPY      approx    CYSTOSCOPY N/A 3/13/2019    Procedure: CYSTOSCOPY;  Surgeon: Rui Lozano DO;  Location: [facility];  Service: OB/GYN;  Laterality: N/A;    DILATION AND CURETTAGE OF UTERUS  2018    Procedure: DILATION AND CURETTAGE, UTERUS;  Surgeon: Arvind Russell MD;  Location: [facility];  Service: OB/GYN;;    HYSTERECTOMY  2019    Robotic DVH/ BSO     HYSTEROSCOPY N/A 2018    Procedure: HYSTEROSCOPY; TRUCLEAR RESECTION OF UTERINE POLYP;  Surgeon: Arvind Russell MD;  Location: [facility];  Service: OB/GYN;  Laterality: N/A;    LAPAROSCOPIC GASTRIC BANDING  2006    POSTERIOR REPAIR N/A 3/13/2019    Procedure: COLPORRHAPHY, POSTERIOR;  Surgeon: Rui Lozano DO;  Location: [facility];  Service: OB/GYN;  Laterality: N/A;    ROBOT-ASSISTED LAPAROSCOPIC ABDOMINAL SACROCOLPOPEXY N/A 3/13/2019    Procedure: ROBOTIC SACROCOLPOPEXY, ABDOMEN;  Surgeon: Rui Lozano DO;  Location: [facility];  Service: OB/GYN;  Laterality: N/A;    ROBOT-ASSISTED LAPAROSCOPIC HYSTERECTOMY N/A 3/13/2019    Procedure: ROBOTIC HYSTERECTOMY;  Surgeon: Arvind Russell MD;  Location: [facility];  Service: OB/GYN;  Laterality: N/A;    ROBOT-ASSISTED LAPAROSCOPIC SALPINGO-OOPHORECTOMY Bilateral 3/13/2019    Procedure: ROBOTIC SALPINGO-OOPHORECTOMY;  Surgeon: Arvind Russell MD;  Location: [facility];  Service: OB/GYN;  Laterality: Bilateral;    SLEEVE GASTROPLASTY  2015       OB History    Para Term  AB Living   2 2 2     2   SAB TAB Ectopic Multiple Live Births           2      # Outcome Date GA  "Lbr Guero/2nd Weight Sex Delivery Anes PTL Lv   2 Term 1979 40w0d   M Vag-Spont EPI  DAVID   1 Term 1975 40w0d   F Vag-Spont EPI  DAVID      Obstetric Comments   Menarche ~ 11       Family History   Problem Relation Age of Onset    Diabetes Mother     Heart disease Father     Stroke Sister     Diabetes Sister     Breast cancer Neg Hx     Ovarian cancer Neg Hx     Cervical cancer Neg Hx     Endometrial cancer Neg Hx     Vaginal cancer Neg Hx        Social History     Tobacco Use    Smoking status: Never Smoker    Smokeless tobacco: Never Used   Substance Use Topics    Alcohol use: No    Drug use: No         ROS:    GENERAL: Denies weight gain or weight loss. Feeling well overall.   SKIN: Denies rash or lesions.   HEENT: Denies headaches, or vision changes.   CARDIOVASCULAR: Denies palpitations or left sided chest pain.   RESPIRATORY: Denies shortness of breath or dyspnea on exertion.  BREASTS: Denies pain, lumps, or nipple discharge.   ABDOMEN: Denies abdominal pain, constipation, diarrhea, nausea, vomiting, change in appetite or rectal bleeding.   URINARY: Denies frequency, dysuria, hematuria.  NEUROLOGIC: Denies syncope or weakness.   PSYCHIATRIC: Denies depression, anxiety or mood swings.    Physical Exam:  /78   Ht 5' 3" (1.6 m)   Wt 112 kg (246 lb 14.6 oz)   BMI 43.74 kg/m²     APPEARANCE: Well nourished, well developed, in no acute distress.  AFFECT: WNL, alert and oriented x 3  SKIN: No acne or hirsutism  BREASTS: Symmetrical, no skin changes.                     No nipple discharge. No palpable masses bilaterally  NODES: No inguinal nor axillary LAD  ABDOMEN: soft Non tender Non distended No masses  PELVIC:   Normal external genitalia without lesions.   Normal urethral meatus. No signif cystocele or rectocele.  Vagina atrophic without lesions or discharge.  Cuff intact  Cervix absent  Adnexa without masses or tenderness.    EXTREMITIES: No edema.    ASSESSMENT AND PLAN  Jenny was seen today " for well woman.    Diagnoses and all orders for this visit:    Encounter for gynecological examination    Breast cancer screening by mammogram  -     Mammo Digital Screening Bilat w/ Torin; Future  -     Mammo Digital Screening Bilat w/ Torin    S/P robot-assisted lap hyst/BSO/sacrocolpoplexy and posterior repair        Patient was counseled today on A.C.S. Pap guidelines and recommendations for yearly pelvic exams, mammograms and monthly self breast exams; to see her PCP for other health maintenance.     Patient encouraged to register for portal and results will be sent via portal.    Follow up in about 1 year (around 10/10/2020).         Health Maintenance   Topic Date Due    Hepatitis C Screening  1955    Lipid Panel  1955    DEXA SCAN  07/28/1994    Pneumococcal Vaccine (65+ Low/Medium Risk) (1 of 2 - PCV13) 07/28/2019    Mammogram  11/05/2019    Colonoscopy  04/01/2023    TETANUS VACCINE  10/29/2025

## 2019-10-16 ENCOUNTER — OFFICE VISIT (OUTPATIENT)
Dept: FAMILY MEDICINE | Facility: CLINIC | Age: 64
End: 2019-10-16
Payer: MEDICARE

## 2019-10-16 VITALS
BODY MASS INDEX: 45.9 KG/M2 | WEIGHT: 249.44 LBS | DIASTOLIC BLOOD PRESSURE: 82 MMHG | TEMPERATURE: 98 F | HEART RATE: 63 BPM | OXYGEN SATURATION: 97 % | RESPIRATION RATE: 18 BRPM | HEIGHT: 62 IN | SYSTOLIC BLOOD PRESSURE: 120 MMHG

## 2019-10-16 DIAGNOSIS — Z23 NEED FOR INFLUENZA VACCINATION: ICD-10-CM

## 2019-10-16 DIAGNOSIS — Z23 NEED FOR VACCINATION AGAINST STREPTOCOCCUS PNEUMONIAE: ICD-10-CM

## 2019-10-16 DIAGNOSIS — E78.2 MIXED HYPERLIPIDEMIA: ICD-10-CM

## 2019-10-16 DIAGNOSIS — K51.919 ULCERATIVE COLITIS WITH COMPLICATION, UNSPECIFIED LOCATION: Chronic | ICD-10-CM

## 2019-10-16 DIAGNOSIS — I11.9 HYPERTENSIVE HEART DISEASE WITHOUT HEART FAILURE: ICD-10-CM

## 2019-10-16 DIAGNOSIS — I25.10 ASCVD (ARTERIOSCLEROTIC CARDIOVASCULAR DISEASE): Primary | ICD-10-CM

## 2019-10-16 DIAGNOSIS — E66.01 MORBID OBESITY WITH BODY MASS INDEX (BMI) OF 45.0 TO 49.9 IN ADULT: ICD-10-CM

## 2019-10-16 PROCEDURE — 99214 OFFICE O/P EST MOD 30 MIN: CPT | Mod: PBBFAC,PN | Performed by: INTERNAL MEDICINE

## 2019-10-16 PROCEDURE — G0009 ADMIN PNEUMOCOCCAL VACCINE: HCPCS | Mod: PBBFAC,PN

## 2019-10-16 PROCEDURE — 99999 PR PBB SHADOW E&M-EST. PATIENT-LVL IV: CPT | Mod: PBBFAC,,, | Performed by: INTERNAL MEDICINE

## 2019-10-16 PROCEDURE — 90662 IIV NO PRSV INCREASED AG IM: CPT | Mod: PBBFAC,PN

## 2019-10-16 PROCEDURE — 99999 PR PBB SHADOW E&M-EST. PATIENT-LVL IV: ICD-10-PCS | Mod: PBBFAC,,, | Performed by: INTERNAL MEDICINE

## 2019-10-16 PROCEDURE — 99214 PR OFFICE/OUTPT VISIT, EST, LEVL IV, 30-39 MIN: ICD-10-PCS | Mod: S$PBB,,, | Performed by: INTERNAL MEDICINE

## 2019-10-16 PROCEDURE — 99214 OFFICE O/P EST MOD 30 MIN: CPT | Mod: S$PBB,,, | Performed by: INTERNAL MEDICINE

## 2019-10-16 NOTE — PROGRESS NOTES
[facility] Note    Chief Complaint      Chief Complaint   Patient presents with    Follow-up     6 month follow up        History of Present Illness      Jenny Caro is a 64 y.o. female who presents today for   Chief Complaint   Patient presents with    Follow-up     6 month follow up    .  Patient comes to appointment here for checkup for chronic issues as discussed in detail Below  . Main complaint is with pain in ankles muscles as well she is using nsids and has recently been taken off statin with dr ghotra.    Problem List Items Addressed This Visit        Cardiac/Vascular    Hyperlipidemia    Overview     Is now off statin secondary to muscle pain dr ghotra has stopped          ASCVD (arteriosclerotic cardiovascular disease) - Primary    Overview     Dr ghotra managing is now off statin , is on as and toprol currently          Hypertensive heart disease without heart failure    Overview     Bop well controlled on current regimen cont same             Endocrine    Morbid obesity with body mass index (BMI) of 45.0 to 49.9 in adult    Overview     [place] ed again on diet and exercise she is limited due to the arthritis pain in her feet            GI    Ulcerative colitis (Chronic)    Overview     Cont lialda . Gi managing dr whitfield            Other Visit Diagnoses     Need for influenza vaccination        Need for vaccination against Streptococcus pneumoniae                Past Medical History:  Past Medical History:   Diagnosis Date    Abnormal Pap smear of cervix 2009    LEEP 2009, mild dysplasia normal paps since    Arthritis     ankles  born with club feet    GERD (gastroesophageal reflux disease)     HPV in female     Hyperlipidemia     Hypertension     Ulcerative colitis        Past Surgical History:  Past Surgical History:   Procedure Laterality Date    CERVICAL BIOPSY  W/ LOOP ELECTRODE EXCISION  2009    COLONOSCOPY  2013    approx    CYSTOSCOPY N/A 3/13/2019     Procedure: CYSTOSCOPY;  Surgeon: Rui Lozano DO;  Location: [facility] OR;  Service: OB/GYN;  Laterality: N/A;    DILATION AND CURETTAGE OF UTERUS  11/20/2018    Procedure: DILATION AND CURETTAGE, UTERUS;  Surgeon: Arvind Russell MD;  Location: [facility] OR;  Service: OB/GYN;;    HYSTERECTOMY  03/13/2019    Robotic DVH/ BSO     HYSTEROSCOPY N/A 11/20/2018    Procedure: HYSTEROSCOPY; TRUCLEAR RESECTION OF UTERINE POLYP;  Surgeon: Arvind Russell MD;  Location: [facility] OR;  Service: OB/GYN;  Laterality: N/A;    LAPAROSCOPIC GASTRIC BANDING  2006    POSTERIOR REPAIR N/A 3/13/2019    Procedure: COLPORRHAPHY, POSTERIOR;  Surgeon: Rui Lozano DO;  Location: [facility] OR;  Service: OB/GYN;  Laterality: N/A;    ROBOT-ASSISTED LAPAROSCOPIC ABDOMINAL SACROCOLPOPEXY N/A 3/13/2019    Procedure: ROBOTIC SACROCOLPOPEXY, ABDOMEN;  Surgeon: Rui Lozano DO;  Location: [facility] OR;  Service: OB/GYN;  Laterality: N/A;    ROBOT-ASSISTED LAPAROSCOPIC HYSTERECTOMY N/A 3/13/2019    Procedure: ROBOTIC HYSTERECTOMY;  Surgeon: Arvind Russell MD;  Location: [facility] OR;  Service: OB/GYN;  Laterality: N/A;    ROBOT-ASSISTED LAPAROSCOPIC SALPINGO-OOPHORECTOMY Bilateral 3/13/2019    Procedure: ROBOTIC SALPINGO-OOPHORECTOMY;  Surgeon: Arvind Russell MD;  Location: [facility] OR;  Service: OB/GYN;  Laterality: Bilateral;    SLEEVE GASTROPLASTY  08/2015       Family History:  family history includes Diabetes in her mother and sister; Heart disease in her father; Stroke in her sister.    Social History:  Social History     Socioeconomic History    Marital status:      Spouse name: Not on file    Number of children: Not on file    Years of education: Not on file    Highest education level: Not on file   Occupational History    Occupation: retired   Social Needs    Financial resource strain: Not on file    Food insecurity:     Worry: Not on file     Inability: Not on file    Transportation needs:     Medical: Not on file      Non-medical: Not on file   Tobacco Use    Smoking status: Never Smoker    Smokeless tobacco: Never Used   Substance and Sexual Activity    Alcohol use: No    Drug use: No    Sexual activity: Yes     Partners: Male     Birth control/protection: Post-menopausal, Surgical     Comment: :     RIOS/BSO   3-13-19   Lifestyle    Physical activity:     Days per week: Not on file     Minutes per session: Not on file    Stress: Not on file   Relationships    Social connections:     Talks on phone: Not on file     Gets together: Not on file     Attends Christianity service: Not on file     Active member of club or organization: Not on file     Attends meetings of clubs or organizations: Not on file     Relationship status: Not on file   Other Topics Concern    Not on file   Social History Narrative    Not on file       Review of Systems:   Review of Systems   Constitutional: Negative for fever and weight loss.   HENT: Negative for congestion, hearing loss and sore throat.    Eyes: Negative for blurred vision.   Respiratory: Negative for cough and shortness of breath.    Cardiovascular: Negative for chest pain, palpitations, claudication and leg swelling.   Gastrointestinal: Negative for abdominal pain, constipation, diarrhea and vomiting.   Genitourinary: Negative for dysuria.   Musculoskeletal: Positive for joint pain and myalgias. Negative for back pain.   Skin: Negative for rash.   Neurological: Negative for focal weakness and headaches.   Psychiatric/Behavioral: Negative for depression and suicidal ideas. The patient is nervous/anxious.          Medications:  Outpatient Encounter Medications as of 10/16/2019   Medication Sig Note Dispense Refill    antiox #8/om3/dha/epa/lut/zeax (PRESERVISION AREDS 2, OMEGA-3, ORAL) Take 1 tablet by mouth once daily.       ascorbic acid, vitamin C, (VITAMIN C) 100 MG tablet Take 100 mg by mouth once daily.       aspirin (ECOTRIN) 81 MG EC tablet Take 81 mg by mouth once  daily.       biotin 1 mg Cap Take by mouth.       calcium citrate-vitamin D3 200 mg calcium -250 unit Tab Take by mouth.       cetirizine (ZYRTEC) 10 MG tablet Take 10 mg by mouth once daily.       fexofenadine (ALLEGRA) 180 MG tablet Take 180 mg by mouth once daily.       ibuprofen (ADVIL) 200 MG tablet Take 200 mg by mouth every 6 (six) hours as needed for Pain.       krill oil 500 mg Cap Take by mouth once daily.       LIALDA 1.2 gram TbEC 2.4 g daily with breakfast.  10/4/2017: Received from: External Pharmacy      lisinopril 10 MG tablet Take 10 mg by mouth once daily.       melatonin (MELATIN ORAL) Take by mouth.       metoprolol succinate (TOPROL-XL) 25 MG 24 hr tablet TK 1  T PO QHS PRN HEART   3    omeprazole (PRILOSEC) 20 MG capsule        simethicone (GAS-X ORAL) Take by mouth once daily.        [DISCONTINUED] ciprofloxacin HCl (CIPRO) 750 MG tablet Take 1 tablet (750 mg total) by mouth every 12 (twelve) hours. (Patient not taking: Reported on 10/16/2019)  60 tablet 1    [DISCONTINUED] meloxicam (MOBIC) 15 MG tablet TK 1 T PO  QD   4     No facility-administered encounter medications on file as of 10/16/2019.         Allergies:  Review of patient's allergies indicates:   Allergen Reactions    Bactrim [sulfamethoxazole-trimethoprim] Itching         Physical Exam      Vitals:    10/16/19 1005   BP: 120/82   Pulse:    Resp:    Temp:       Body mass index is 45.63 kg/m².    Physical Exam   Constitutional: She is oriented to person, place, and time. She appears well-developed and well-nourished.   HENT:   Mouth/Throat: Oropharynx is clear and moist.   Eyes: Pupils are equal, round, and reactive to light. EOM are normal.   Neck: Normal range of motion. No thyromegaly present.   Cardiovascular: Normal rate and normal heart sounds. Exam reveals no gallop and no friction rub.   No murmur heard.  Pulmonary/Chest: Breath sounds normal.   Abdominal: Soft. Bowel sounds are normal.   Musculoskeletal:  Normal range of motion.   Lymphadenopathy:     She has no cervical adenopathy.   Neurological: She is alert and oriented to person, place, and time. No cranial nerve deficit.   Skin: Skin is warm. No rash noted.   Psychiatric: She has a normal mood and affect. Her behavior is normal.        Laboratory:  CBC:  No results for input(s): WBC, RBC, HGB, HCT, PLT, MCV, MCH, MCHC in the last 2160 hours.  CMP:  No results for input(s): GLU, CALCIUM, ALBUMIN, PROT, NA, K, CO2, CL, BUN, ALKPHOS, ALT, AST, BILITOT in the last 2160 hours.    Invalid input(s): CREATININ  URINALYSIS:  No results for input(s): COLORU, CLARITYU, SPECGRAV, PHUR, PROTEINUA, GLUCOSEU, BILIRUBINCON, BLOODU, WBCU, RBCU, BACTERIA, MUCUS, NITRITE, LEUKOCYTESUR, UROBILINOGEN, HYALINECASTS in the last 2160 hours.   LIPIDS:  No results for input(s): TSH, HDL, CHOL, TRIG, LDLCALC, CHOLHDL, NONHDLCHOL, TOTALCHOLEST in the last 2160 hours.  TSH:  No results for input(s): TSH in the last 2160 hours.  A1C:  No results for input(s): HGBA1C in the last 2160 hours.    Radiology:        Assessment:     Jenny Caro is a 64 y.o.female with:    ASCVD (arteriosclerotic cardiovascular disease)    Hypertensive heart disease without heart failure    Ulcerative colitis with complication, unspecified location    Mixed hyperlipidemia    Morbid obesity with body mass index (BMI) of 45.0 to 49.9 in adult    Need for influenza vaccination  -     Influenza - High Dose (65+) (PF) (IM)    Need for vaccination against Streptococcus pneumoniae  -     Pneumococcal Conjugate Vaccine (13 Valent) (IM)          Plan:     As above, continue current medications and maintain follow up with specialists.  Return to clinic in 6months.    Frederick W Dantagnan [facility] [place]

## 2020-04-08 NOTE — TELEPHONE ENCOUNTER
----- Message from John Weir sent at 4/8/2020  2:25 PM CDT -----  Contact: Pt  Pt needs a refill on  -omeprazole (PRILOSEC) 20 MG capsule    Pharmacy: [facility] - [facility] [address]    Pt can be reached at [phone]

## 2020-04-09 RX ORDER — OMEPRAZOLE 20 MG/1
20 CAPSULE, DELAYED RELEASE ORAL DAILY
Qty: 90 CAPSULE | Refills: 3 | Status: SHIPPED | OUTPATIENT
Start: 2020-04-09 | End: 2020-06-26 | Stop reason: SDUPTHER

## 2020-05-13 DIAGNOSIS — I11.9 HYPERTENSIVE HEART DISEASE WITHOUT HEART FAILURE: Primary | ICD-10-CM

## 2020-05-13 NOTE — TELEPHONE ENCOUNTER
----- Message from Bharati Contreras sent at 5/13/2020  1:36 PM CDT -----  Contact: [phone]  /  [facility]  Requesting an RX refill or new RX.  Is this a refill or new RX:  NEW  RX name and strength: lisinopril 10 MG tablet  Directions (copy/paste from chart):  Take 10 mg by mouth once daily. - Oral  Is this a 30 day or 90 day RX:90  Local pharmacy or mail order pharmacy:Mail     Pharmacy name and phone # (copy/paste from chart): [facility]   [phone] (Phone)  [phone] (Fax)    Comments:  Historical Provider, MD Madisyn Brown MD /  Out and need it sent in today. Mail Order [facility].

## 2020-05-14 RX ORDER — LISINOPRIL 10 MG/1
10 TABLET ORAL DAILY
Qty: 90 TABLET | Refills: 3 | Status: SHIPPED | OUTPATIENT
Start: 2020-05-14 | End: 2020-06-26 | Stop reason: SDUPTHER

## 2020-06-12 ENCOUNTER — TELEPHONE (OUTPATIENT)
Dept: ADMINISTRATIVE | Facility: HOSPITAL | Age: 65
End: 2020-06-12

## 2020-06-12 ENCOUNTER — PATIENT OUTREACH (OUTPATIENT)
Dept: ADMINISTRATIVE | Facility: HOSPITAL | Age: 65
End: 2020-06-12

## 2020-06-12 DIAGNOSIS — Z78.0 MENOPAUSE: Primary | ICD-10-CM

## 2020-06-12 DIAGNOSIS — Z13.220 ENCOUNTER FOR SCREENING FOR LIPID DISORDER: Primary | ICD-10-CM

## 2020-06-26 ENCOUNTER — OFFICE VISIT (OUTPATIENT)
Dept: FAMILY MEDICINE | Facility: CLINIC | Age: 65
End: 2020-06-26
Payer: MEDICARE

## 2020-06-26 VITALS
HEART RATE: 76 BPM | TEMPERATURE: 98 F | RESPIRATION RATE: 18 BRPM | DIASTOLIC BLOOD PRESSURE: 82 MMHG | SYSTOLIC BLOOD PRESSURE: 128 MMHG | HEIGHT: 62 IN | WEIGHT: 262.69 LBS | BODY MASS INDEX: 48.34 KG/M2 | OXYGEN SATURATION: 97 %

## 2020-06-26 DIAGNOSIS — I25.10 ASCVD (ARTERIOSCLEROTIC CARDIOVASCULAR DISEASE): ICD-10-CM

## 2020-06-26 DIAGNOSIS — K21.9 GASTROESOPHAGEAL REFLUX DISEASE WITHOUT ESOPHAGITIS: Chronic | ICD-10-CM

## 2020-06-26 DIAGNOSIS — I11.9 HYPERTENSIVE HEART DISEASE WITHOUT HEART FAILURE: Primary | ICD-10-CM

## 2020-06-26 DIAGNOSIS — E78.2 MIXED HYPERLIPIDEMIA: ICD-10-CM

## 2020-06-26 DIAGNOSIS — E66.01 MORBID OBESITY WITH BODY MASS INDEX (BMI) OF 45.0 TO 49.9 IN ADULT: ICD-10-CM

## 2020-06-26 DIAGNOSIS — K51.919 ULCERATIVE COLITIS WITH COMPLICATION, UNSPECIFIED LOCATION: Chronic | ICD-10-CM

## 2020-06-26 PROCEDURE — 99214 OFFICE O/P EST MOD 30 MIN: CPT | Mod: S$PBB,,, | Performed by: INTERNAL MEDICINE

## 2020-06-26 PROCEDURE — 99214 OFFICE O/P EST MOD 30 MIN: CPT | Mod: PBBFAC,PN | Performed by: INTERNAL MEDICINE

## 2020-06-26 PROCEDURE — 99999 PR PBB SHADOW E&M-EST. PATIENT-LVL IV: ICD-10-PCS | Mod: PBBFAC,,, | Performed by: INTERNAL MEDICINE

## 2020-06-26 PROCEDURE — 99214 PR OFFICE/OUTPT VISIT, EST, LEVL IV, 30-39 MIN: ICD-10-PCS | Mod: S$PBB,,, | Performed by: INTERNAL MEDICINE

## 2020-06-26 PROCEDURE — 99999 PR PBB SHADOW E&M-EST. PATIENT-LVL IV: CPT | Mod: PBBFAC,,, | Performed by: INTERNAL MEDICINE

## 2020-06-26 RX ORDER — OMEPRAZOLE 20 MG/1
20 CAPSULE, DELAYED RELEASE ORAL DAILY
Qty: 90 CAPSULE | Refills: 3 | Status: SHIPPED | OUTPATIENT
Start: 2020-06-26 | End: 2021-04-21 | Stop reason: SDUPTHER

## 2020-06-26 RX ORDER — LISINOPRIL 10 MG/1
10 TABLET ORAL DAILY
Qty: 90 TABLET | Refills: 3 | Status: SHIPPED | OUTPATIENT
Start: 2020-06-26 | End: 2021-04-21 | Stop reason: SDUPTHER

## 2020-06-26 RX ORDER — ROSUVASTATIN CALCIUM 5 MG/1
5 TABLET, COATED ORAL DAILY
COMMUNITY
End: 2023-01-05 | Stop reason: SDUPTHER

## 2020-06-26 NOTE — PROGRESS NOTES
[facility] Note    Chief Complaint      Chief Complaint   Patient presents with    Follow-up     6 month follow up        History of Present Illness      Jenny Caro is a 64 y.o. female who presents today for   Chief Complaint   Patient presents with    Follow-up     6 month follow up    .  Patient comes to appointment here for 6 m checkup for chronic issues as discussed in detail below . She is compliant with all meds and hospital f/u. She is doing well with cdc guidelines for covid . All vaccines are uptodate . She is seeing dr ghotra for cardiac issues. I have discussed with her diet and weight management .    Problem List Items Addressed This Visit        Cardiac/Vascular    Hyperlipidemia    Overview     Is now back on statin crestor will be getting labs with dr ghotra soon          ASCVD (arteriosclerotic cardiovascular disease)    Overview     Dr ghotra managing is now on rosuvastatin  , she is also on asa , and metoprolol as well . She denies chest pain           Hypertensive heart disease without heart failure - Primary    Overview     Bop well controlled on current regimen cont same             Endocrine    Morbid obesity with body mass index (BMI) of 45.0 to 49.9 in adult    Overview     counciled again on diet and exercise she is limited due to the arthritis pain in her feet   She admits to more indiscretion with diet during current stay at home for covid 19            GI    Gastroesophageal reflux disease (Chronic)    Ulcerative colitis (Chronic)    Overview     Cont lialda . Gi managing dr whitfield                  Past Medical History:  Past Medical History:   Diagnosis Date    Abnormal Pap smear of cervix 2009    LEEP 2009, mild dysplasia normal paps since    Arthritis     ankles  born with club feet    GERD (gastroesophageal reflux disease)     HPV in female     Hyperlipidemia     Hypertension     Ulcerative colitis        Past Surgical History:  Past Surgical  History:   Procedure Laterality Date    CERVICAL BIOPSY  W/ LOOP ELECTRODE EXCISION  2009    COLONOSCOPY  2013    approx    CYSTOSCOPY N/A 3/13/2019    Procedure: CYSTOSCOPY;  Surgeon: Rui Lozano DO;  Location: [facility] OR;  Service: OB/GYN;  Laterality: N/A;    DILATION AND CURETTAGE OF UTERUS  11/20/2018    Procedure: DILATION AND CURETTAGE, UTERUS;  Surgeon: Arvind Russell MD;  Location: [facility] OR;  Service: OB/GYN;;    HYSTERECTOMY  03/13/2019    Robotic DVH/ BSO     HYSTEROSCOPY N/A 11/20/2018    Procedure: HYSTEROSCOPY; TRUCLEAR RESECTION OF UTERINE POLYP;  Surgeon: Arvind Russell MD;  Location: [facility] OR;  Service: OB/GYN;  Laterality: N/A;    LAPAROSCOPIC GASTRIC BANDING  2006    POSTERIOR REPAIR N/A 3/13/2019    Procedure: COLPORRHAPHY, POSTERIOR;  Surgeon: Rui Lozano DO;  Location: [facility] OR;  Service: OB/GYN;  Laterality: N/A;    ROBOT-ASSISTED LAPAROSCOPIC ABDOMINAL SACROCOLPOPEXY N/A 3/13/2019    Procedure: ROBOTIC SACROCOLPOPEXY, ABDOMEN;  Surgeon: Rui Lozano DO;  Location: [facility] OR;  Service: OB/GYN;  Laterality: N/A;    ROBOT-ASSISTED LAPAROSCOPIC HYSTERECTOMY N/A 3/13/2019    Procedure: ROBOTIC HYSTERECTOMY;  Surgeon: Arvind Russell MD;  Location: [facility] OR;  Service: OB/GYN;  Laterality: N/A;    ROBOT-ASSISTED LAPAROSCOPIC SALPINGO-OOPHORECTOMY Bilateral 3/13/2019    Procedure: ROBOTIC SALPINGO-OOPHORECTOMY;  Surgeon: Arvind Russell MD;  Location: [facility];  Service: OB/GYN;  Laterality: Bilateral;    SLEEVE GASTROPLASTY  08/2015       Family History:  family history includes Diabetes in her mother and sister; Heart disease in her father; Stroke in her sister.    Social History:  Social History     Socioeconomic History    Marital status:      Spouse name: Not on file    Number of children: Not on file    Years of education: Not on file    Highest education level: Not on file   Occupational History    Occupation: retired   Social Needs    Financial  resource strain: Not on file    Food insecurity     Worry: Not on file     Inability: Not on file    Transportation needs     Medical: Not on file     Non-medical: Not on file   Tobacco Use    Smoking status: Never Smoker    Smokeless tobacco: Never Used   Substance and Sexual Activity    Alcohol use: No    Drug use: No    Sexual activity: Yes     Partners: Male     Birth control/protection: Post-menopausal, Surgical     Comment: :     DVH/BSO   3-13-19   Lifestyle    Physical activity     Days per week: Not on file     Minutes per session: Not on file    Stress: Not on file   Relationships    Social connections     Talks on phone: Not on file     Gets together: Not on file     Attends Gnosticism service: Not on file     Active member of club or organization: Not on file     Attends meetings of clubs or organizations: Not on file     Relationship status: Not on file   Other Topics Concern    Not on file   Social History Narrative    Not on file       Review of Systems:   Review of Systems   Constitutional: Negative for fever and weight loss.   HENT: Negative for congestion, hearing loss and sore throat.    Eyes: Negative for blurred vision.   Respiratory: Negative for cough and shortness of breath.    Cardiovascular: Negative for chest pain, palpitations, claudication and leg swelling.   Gastrointestinal: Negative for abdominal pain, constipation, diarrhea and heartburn.   Genitourinary: Negative for dysuria.   Musculoskeletal: Positive for myalgias. Negative for back pain.   Skin: Negative for rash.   Neurological: Negative for focal weakness and headaches.   Psychiatric/Behavioral: Negative for depression and suicidal ideas. The patient is not nervous/anxious.          Medications:  Outpatient Encounter Medications as of 6/26/2020   Medication Sig Note Dispense Refill    antiox #8/om3/dha/epa/lut/zeax (PRESERVISION AREDS 2, OMEGA-3, ORAL) Take 1 tablet by mouth once daily.       ascorbic acid,  vitamin C, (VITAMIN C) 100 MG tablet Take 100 mg by mouth once daily.       aspirin (ECOTRIN) 81 MG EC tablet Take 81 mg by mouth once daily.       biotin 1 mg Cap Take by mouth.       calcium citrate-vitamin D3 200 mg calcium -250 unit Tab Take by mouth.       cetirizine (ZYRTEC) 10 MG tablet Take 10 mg by mouth once daily.       fexofenadine (ALLEGRA) 180 MG tablet Take 180 mg by mouth once daily.       ibuprofen (ADVIL) 200 MG tablet Take 200 mg by mouth every 6 (six) hours as needed for Pain.       krill oil 500 mg Cap Take by mouth once daily.       LIALDA 1.2 gram TbEC 2.4 g daily with breakfast.  10/4/2017: Received from: External Pharmacy      lisinopriL 10 MG tablet Take 1 tablet (10 mg total) by mouth once daily.  90 tablet 3    melatonin (MELATIN ORAL) Take by mouth.       metoprolol succinate (TOPROL-XL) 25 MG 24 hr tablet TK 1  T PO QHS PRN HEART   3    omeprazole (PRILOSEC) 20 MG capsule Take 1 capsule (20 mg total) by mouth once daily.  90 capsule 3    rosuvastatin (CRESTOR) 5 MG tablet Take 5 mg by mouth once daily.       simethicone (GAS-X ORAL) Take by mouth once daily.        [DISCONTINUED] lisinopriL 10 MG tablet Take 1 tablet (10 mg total) by mouth once daily.  90 tablet 3    [DISCONTINUED] omeprazole (PRILOSEC) 20 MG capsule Take 1 capsule (20 mg total) by mouth once daily.  90 capsule 3     No facility-administered encounter medications on file as of 6/26/2020.         Allergies:  Review of patient's allergies indicates:   Allergen Reactions    Bactrim [sulfamethoxazole-trimethoprim] Itching         Physical Exam      Vitals:    06/26/20 0824   BP: 128/82   Pulse: 76   Resp: 18   Temp: 98 °F (36.7 °C)      Body mass index is 48.04 kg/m².    Physical Exam  Constitutional:       Appearance: She is well-developed.   Eyes:      Pupils: Pupils are equal, round, and reactive to light.   Neck:      Musculoskeletal: Normal range of motion.      Thyroid: No thyromegaly.    Cardiovascular:      Rate and Rhythm: Normal rate.      Heart sounds: Normal heart sounds. No murmur. No friction rub. No gallop.    Pulmonary:      Breath sounds: Normal breath sounds.   Abdominal:      General: Bowel sounds are normal.      Palpations: Abdomen is soft.   Musculoskeletal: Normal range of motion.        Arms:    Lymphadenopathy:      Cervical: No cervical adenopathy.   Skin:     General: Skin is warm.      Findings: No rash.   Neurological:      Mental Status: She is alert and oriented to person, place, and time.      Cranial Nerves: No cranial nerve deficit.   Psychiatric:         Behavior: Behavior normal.          Laboratory:  CBC:  No results for input(s): WBC, RBC, HGB, HCT, PLT, MCV, MCH, MCHC in the last 2160 hours.  CMP:  No results for input(s): GLU, CALCIUM, ALBUMIN, PROT, NA, K, CO2, CL, BUN, ALKPHOS, ALT, AST, BILITOT in the last 2160 hours.    Invalid input(s): CREATININ  URINALYSIS:  No results for input(s): COLORU, CLARITYU, SPECGRAV, PHUR, PROTEINUA, GLUCOSEU, BILIRUBINCON, BLOODU, WBCU, RBCU, BACTERIA, MUCUS, NITRITE, LEUKOCYTESUR, UROBILINOGEN, HYALINECASTS in the last 2160 hours.   LIPIDS:  No results for input(s): TSH, HDL, CHOL, TRIG, LDLCALC, CHOLHDL, NONHDLCHOL, TOTALCHOLEST in the last 2160 hours.  TSH:  No results for input(s): TSH in the last 2160 hours.  A1C:  No results for input(s): HGBA1C in the last 2160 hours.    Radiology:        Assessment:     Jenny Caro is a 64 y.o.female with:    Hypertensive heart disease without heart failure  -     lisinopriL 10 MG tablet; Take 1 tablet (10 mg total) by mouth once daily.  Dispense: 90 tablet; Refill: 3    Ulcerative colitis with complication, unspecified location    ASCVD (arteriosclerotic cardiovascular disease)    Mixed hyperlipidemia    Morbid obesity with body mass index (BMI) of 45.0 to 49.9 in adult    Gastroesophageal reflux disease without esophagitis  -     omeprazole (PRILOSEC) 20 MG capsule; Take 1 capsule  (20 mg total) by mouth once daily.  Dispense: 90 capsule; Refill: 3          Plan:     Problem List Items Addressed This Visit        Cardiac/Vascular    Hyperlipidemia    Overview     Is now back on statin crestor will be getting labs with dr ghotra soon          ASCVD (arteriosclerotic cardiovascular disease)    Overview     Dr ghotra managing is now on rosuvastatin  , she is also on asa , and metoprolol as well . She denies chest pain           Hypertensive heart disease without heart failure - Primary    Overview     Bop well controlled on current regimen cont same             Endocrine    Morbid obesity with body mass index (BMI) of 45.0 to 49.9 in adult    Overview     counciled again on diet and exercise she is limited due to the arthritis pain in her feet   She admits to more indiscretion with diet during current stay at home for covid 19            GI    Gastroesophageal reflux disease (Chronic)    Ulcerative colitis (Chronic)    Overview     Cont lialda . Gi managing dr whitfield                As above, continue current medications and maintain follow up with specialists.  Return to clinic in 4 months.      Frederick W Dantagnan [facility] [place]

## 2020-10-22 ENCOUNTER — PATIENT OUTREACH (OUTPATIENT)
Dept: ADMINISTRATIVE | Facility: HOSPITAL | Age: 65
End: 2020-10-22

## 2020-10-22 ENCOUNTER — TELEPHONE (OUTPATIENT)
Dept: ADMINISTRATIVE | Facility: HOSPITAL | Age: 65
End: 2020-10-22

## 2020-11-05 ENCOUNTER — TELEPHONE (OUTPATIENT)
Dept: FAMILY MEDICINE | Facility: CLINIC | Age: 65
End: 2020-11-05

## 2020-11-05 ENCOUNTER — OFFICE VISIT (OUTPATIENT)
Dept: FAMILY MEDICINE | Facility: CLINIC | Age: 65
End: 2020-11-05
Payer: MEDICARE

## 2020-11-05 VITALS
BODY MASS INDEX: 47.81 KG/M2 | TEMPERATURE: 98 F | SYSTOLIC BLOOD PRESSURE: 130 MMHG | RESPIRATION RATE: 18 BRPM | OXYGEN SATURATION: 98 % | HEIGHT: 62 IN | HEART RATE: 67 BPM | WEIGHT: 259.81 LBS | DIASTOLIC BLOOD PRESSURE: 78 MMHG

## 2020-11-05 DIAGNOSIS — E78.2 MIXED HYPERLIPIDEMIA: ICD-10-CM

## 2020-11-05 DIAGNOSIS — I25.10 ASCVD (ARTERIOSCLEROTIC CARDIOVASCULAR DISEASE): ICD-10-CM

## 2020-11-05 DIAGNOSIS — E66.01 MORBID OBESITY WITH BODY MASS INDEX (BMI) OF 45.0 TO 49.9 IN ADULT: ICD-10-CM

## 2020-11-05 DIAGNOSIS — K21.9 GASTROESOPHAGEAL REFLUX DISEASE WITHOUT ESOPHAGITIS: Chronic | ICD-10-CM

## 2020-11-05 DIAGNOSIS — I11.9 HYPERTENSIVE HEART DISEASE WITHOUT HEART FAILURE: Primary | ICD-10-CM

## 2020-11-05 DIAGNOSIS — Z23 NEED FOR INFLUENZA VACCINATION: ICD-10-CM

## 2020-11-05 DIAGNOSIS — K51.919 ULCERATIVE COLITIS WITH COMPLICATION, UNSPECIFIED LOCATION: Chronic | ICD-10-CM

## 2020-11-05 DIAGNOSIS — Z23 NEED FOR PNEUMOCOCCAL VACCINATION: ICD-10-CM

## 2020-11-05 PROCEDURE — G0008 ADMIN INFLUENZA VIRUS VAC: HCPCS | Mod: PBBFAC,PN

## 2020-11-05 PROCEDURE — G0009 ADMIN PNEUMOCOCCAL VACCINE: HCPCS | Mod: PBBFAC,PN

## 2020-11-05 PROCEDURE — 99214 OFFICE O/P EST MOD 30 MIN: CPT | Mod: S$PBB,,, | Performed by: INTERNAL MEDICINE

## 2020-11-05 PROCEDURE — 99215 OFFICE O/P EST HI 40 MIN: CPT | Mod: PBBFAC,PN,25 | Performed by: INTERNAL MEDICINE

## 2020-11-05 PROCEDURE — 99999 PR PBB SHADOW E&M-EST. PATIENT-LVL V: CPT | Mod: PBBFAC,,, | Performed by: INTERNAL MEDICINE

## 2020-11-05 PROCEDURE — 99214 PR OFFICE/OUTPT VISIT, EST, LEVL IV, 30-39 MIN: ICD-10-PCS | Mod: S$PBB,,, | Performed by: INTERNAL MEDICINE

## 2020-11-05 PROCEDURE — 99999 PR PBB SHADOW E&M-EST. PATIENT-LVL V: ICD-10-PCS | Mod: PBBFAC,,, | Performed by: INTERNAL MEDICINE

## 2020-11-05 PROCEDURE — 90694 VACC AIIV4 NO PRSRV 0.5ML IM: CPT | Mod: PBBFAC,PN

## 2020-11-05 RX ORDER — BROMPHENIRAMINE MALEATE, PSEUDOEPHEDRINE HYDROCHLORIDE, AND DEXTROMETHORPHAN HYDROBROMIDE 2; 30; 10 MG/5ML; MG/5ML; MG/5ML
SYRUP ORAL
COMMUNITY
End: 2020-11-05 | Stop reason: SDUPTHER

## 2020-11-05 RX ORDER — BROMPHENIRAMINE MALEATE, PSEUDOEPHEDRINE HYDROCHLORIDE, AND DEXTROMETHORPHAN HYDROBROMIDE 2; 30; 10 MG/5ML; MG/5ML; MG/5ML
5 SYRUP ORAL 4 TIMES DAILY PRN
Qty: 118 ML | Refills: 0 | Status: SHIPPED | OUTPATIENT
Start: 2020-11-05 | End: 2021-05-05

## 2020-11-05 NOTE — PROGRESS NOTES
[facility] Note    Chief Complaint      Chief Complaint   Patient presents with    Follow-up     6 month follow up        History of Present Illness      Jenny Caro is a 65 y.o. female who presents today for   Chief Complaint   Patient presents with    Follow-up     6 month follow up    .  Patient comes to appointment today for 6 m checkup for chronic issues as below . She has just had labs with dr ghotra in 8/2020 all reviewed today by me and look good . She is compliant with all meds and is actively trying to diet . She needs flu vaccine , and pneumococcal.      Problem List Items Addressed This Visit        Cardiac/Vascular    Hyperlipidemia    Overview     Is now back on statin crestor . Labs from 8/2020 ldl down to 104 , lfts wnl         ASCVD (arteriosclerotic cardiovascular disease)    Overview     Dr ghotra managing is now on rosuvastatin  , she is also on asa , and metoprolol for secondary prevention          Hypertensive heart disease without heart failure - Primary    Overview     Bp well controlled on current regimen cont same .            ID    Need for pneumococcal vaccination    Overview     Pneumococcal vaccine given         Need for influenza vaccination    Overview     Hd given            Endocrine    Morbid obesity with body mass index (BMI) of 45.0 to 49.9 in adult    Overview     counciled again on diet , she has lost 4 lbs . Limited on exercise             GI    Gastroesophageal reflux disease (Chronic)    Overview     Cont prilosec is working well          Ulcerative colitis (Chronic)    Overview     Cont lialda . Gi managing dr whitfield . She is asymptomatic                 Past Medical History:  Past Medical History:   Diagnosis Date    Abnormal Pap smear of cervix 2009    LEEP 2009, mild dysplasia normal paps since    Arthritis     ankles  born with club feet    GERD (gastroesophageal reflux disease)     HPV in female     Hyperlipidemia     Hypertension      Ulcerative colitis        Past Surgical History:  Past Surgical History:   Procedure Laterality Date    CERVICAL BIOPSY  W/ LOOP ELECTRODE EXCISION  2009    COLONOSCOPY  2013    approx    CYSTOSCOPY N/A 3/13/2019    Procedure: CYSTOSCOPY;  Surgeon: Rui Lozano DO;  Location: [facility] OR;  Service: OB/GYN;  Laterality: N/A;    DILATION AND CURETTAGE OF UTERUS  11/20/2018    Procedure: DILATION AND CURETTAGE, UTERUS;  Surgeon: Arvind Russell MD;  Location: [facility] OR;  Service: OB/GYN;;    HYSTERECTOMY  03/13/2019    Robotic DVH/ BSO     HYSTEROSCOPY N/A 11/20/2018    Procedure: HYSTEROSCOPY; TRUCLEAR RESECTION OF UTERINE POLYP;  Surgeon: Arvind Russell MD;  Location: [facility] OR;  Service: OB/GYN;  Laterality: N/A;    LAPAROSCOPIC GASTRIC BANDING  2006    POSTERIOR REPAIR N/A 3/13/2019    Procedure: COLPORRHAPHY, POSTERIOR;  Surgeon: Rui Lozano DO;  Location: [facility] OR;  Service: OB/GYN;  Laterality: N/A;    ROBOT-ASSISTED LAPAROSCOPIC ABDOMINAL SACROCOLPOPEXY N/A 3/13/2019    Procedure: ROBOTIC SACROCOLPOPEXY, ABDOMEN;  Surgeon: Rui Lozano DO;  Location: [facility] OR;  Service: OB/GYN;  Laterality: N/A;    ROBOT-ASSISTED LAPAROSCOPIC HYSTERECTOMY N/A 3/13/2019    Procedure: ROBOTIC HYSTERECTOMY;  Surgeon: Arvind Russell MD;  Location: [facility] OR;  Service: OB/GYN;  Laterality: N/A;    ROBOT-ASSISTED LAPAROSCOPIC SALPINGO-OOPHORECTOMY Bilateral 3/13/2019    Procedure: ROBOTIC SALPINGO-OOPHORECTOMY;  Surgeon: Arvind Russell MD;  Location: [facility];  Service: OB/GYN;  Laterality: Bilateral;    SLEEVE GASTROPLASTY  08/2015       Family History:  family history includes Diabetes in her mother and sister; Heart disease in her father; Stroke in her sister.    Social History:  Social History     Socioeconomic History    Marital status:      Spouse name: Not on file    Number of children: Not on file    Years of education: Not on file    Highest education level: Not on file   Occupational  History    Occupation: retired   Social Needs    Financial resource strain: Not on file    Food insecurity     Worry: Not on file     Inability: Not on file    Transportation needs     Medical: Not on file     Non-medical: Not on file   Tobacco Use    Smoking status: Never Smoker    Smokeless tobacco: Never Used   Substance and Sexual Activity    Alcohol use: No    Drug use: No    Sexual activity: Yes     Partners: Male     Birth control/protection: Post-menopausal, Surgical     Comment: :     DVH/BSO   3-13-19   Lifestyle    Physical activity     Days per week: Not on file     Minutes per session: Not on file    Stress: Not on file   Relationships    Social connections     Talks on phone: Not on file     Gets together: Not on file     Attends Oriental orthodox service: Not on file     Active member of club or organization: Not on file     Attends meetings of clubs or organizations: Not on file     Relationship status: Not on file   Other Topics Concern    Not on file   Social History Narrative    Not on file       Review of Systems:   Review of Systems   Constitutional: Negative for fever and weight loss.   HENT: Negative for congestion, hearing loss and sore throat.    Eyes: Negative for blurred vision.   Respiratory: Negative for cough and shortness of breath.    Cardiovascular: Negative for chest pain, palpitations, claudication and leg swelling.   Gastrointestinal: Negative for abdominal pain, blood in stool, constipation, diarrhea and heartburn.   Genitourinary: Negative for dysuria.   Musculoskeletal: Positive for joint pain. Negative for back pain and myalgias.   Skin: Negative for rash.   Neurological: Negative for focal weakness and headaches.   Psychiatric/Behavioral: Negative for depression and suicidal ideas. The patient is not nervous/anxious.          Medications:  Outpatient Encounter Medications as of 11/5/2020   Medication Sig Note Dispense Refill    antiox #8/om3/dha/epa/lut/zeax  (PRESERVISION AREDS 2, OMEGA-3, ORAL) Take 1 tablet by mouth once daily.       ascorbic acid, vitamin C, (VITAMIN C) 100 MG tablet Take 100 mg by mouth once daily.       aspirin (ECOTRIN) 81 MG EC tablet Take 81 mg by mouth once daily.       biotin 1 mg Cap Take by mouth.       calcium citrate-vitamin D3 200 mg calcium -250 unit Tab Take by mouth.       cetirizine (ZYRTEC) 10 MG tablet Take 10 mg by mouth once daily.       fexofenadine (ALLEGRA) 180 MG tablet Take 180 mg by mouth once daily.       ibuprofen (ADVIL) 200 MG tablet Take 200 mg by mouth every 6 (six) hours as needed for Pain.       krill oil 500 mg Cap Take by mouth once daily.       LIALDA 1.2 gram TbEC 2.4 g daily with breakfast.  10/4/2017: Received from: External Pharmacy      lisinopriL 10 MG tablet Take 1 tablet (10 mg total) by mouth once daily.  90 tablet 3    melatonin (MELATIN ORAL) Take by mouth.       metoprolol succinate (TOPROL-XL) 25 MG 24 hr tablet TK 1  T PO QHS PRN HEART   3    omeprazole (PRILOSEC) 20 MG capsule Take 1 capsule (20 mg total) by mouth once daily.  90 capsule 3    rosuvastatin (CRESTOR) 5 MG tablet Take 5 mg by mouth once daily.       simethicone (GAS-X ORAL) Take by mouth once daily.         No facility-administered encounter medications on file as of 11/5/2020.         Allergies:  Review of patient's allergies indicates:   Allergen Reactions    Bactrim [sulfamethoxazole-trimethoprim] Itching         Physical Exam      Vitals:    11/05/20 0823   BP: 130/78   Pulse: 67   Resp: 18   Temp: 98.1 °F (36.7 °C)      Body mass index is 47.52 kg/m².    Physical Exam  Constitutional:       Appearance: She is well-developed.   Eyes:      Pupils: Pupils are equal, round, and reactive to light.   Neck:      Musculoskeletal: Normal range of motion.      Thyroid: No thyromegaly.   Cardiovascular:      Rate and Rhythm: Normal rate.      Heart sounds: Normal heart sounds. No murmur. No friction rub. No gallop.     Pulmonary:      Breath sounds: Normal breath sounds.   Abdominal:      General: Bowel sounds are normal.      Palpations: Abdomen is soft.   Musculoskeletal: Normal range of motion.   Lymphadenopathy:      Cervical: No cervical adenopathy.   Skin:     General: Skin is warm.      Findings: No rash.   Neurological:      Mental Status: She is alert and oriented to person, place, and time.      Cranial Nerves: No cranial nerve deficit.   Psychiatric:         Behavior: Behavior normal.          Laboratory:  CBC:  No results for input(s): WBC, RBC, HGB, HCT, PLT, MCV, MCH, MCHC in the last 2160 hours.  CMP:  No results for input(s): GLU, CALCIUM, ALBUMIN, PROT, NA, K, CO2, CL, BUN, ALKPHOS, ALT, AST, BILITOT in the last 2160 hours.    Invalid input(s): CREATININ  URINALYSIS:  No results for input(s): COLORU, CLARITYU, SPECGRAV, PHUR, PROTEINUA, GLUCOSEU, BILIRUBINCON, BLOODU, WBCU, RBCU, BACTERIA, MUCUS, NITRITE, LEUKOCYTESUR, UROBILINOGEN, HYALINECASTS in the last 2160 hours.   LIPIDS:  No results for input(s): TSH, HDL, CHOL, TRIG, LDLCALC, CHOLHDL, NONHDLCHOL, TOTALCHOLEST in the last 2160 hours.  TSH:  No results for input(s): TSH in the last 2160 hours.  A1C:  No results for input(s): HGBA1C in the last 2160 hours.    Radiology:        Assessment:     Jenny Caro is a 65 y.o.female with:    Hypertensive heart disease without heart failure    Ulcerative colitis with complication, unspecified location    ASCVD (arteriosclerotic cardiovascular disease)    Mixed hyperlipidemia    Morbid obesity with body mass index (BMI) of 45.0 to 49.9 in adult    Gastroesophageal reflux disease without esophagitis    Need for pneumococcal vaccination  -     Pneumococcal Polysaccharide Vaccine (23 Valent) (SQ/IM)    Need for influenza vaccination  -     Influenza (FLUAD) - Quadrivalent (Adjuvanted) *Preferred* (65+) (PF)          Plan:     Problem List Items Addressed This Visit        Cardiac/Vascular    Hyperlipidemia     Overview     Is now back on statin crestor . Labs from 8/2020 ldl down to 104 , lfts wnl         ASCVD (arteriosclerotic cardiovascular disease)    Overview     Dr ghotra managing is now on rosuvastatin  , she is also on asa , and metoprolol for secondary prevention          Hypertensive heart disease without heart failure - Primary    Overview     Bp well controlled on current regimen cont same .            ID    Need for pneumococcal vaccination    Overview     Pneumococcal vaccine given         Need for influenza vaccination    Overview     Hd given            Endocrine    Morbid obesity with body mass index (BMI) of 45.0 to 49.9 in adult    Overview     counciled again on diet , she has lost 4 lbs . Limited on exercise             GI    Gastroesophageal reflux disease (Chronic)    Overview     Cont prilosec is working well          Ulcerative colitis (Chronic)    Overview     Cont lialda . Gi managing dr whitfield . She is asymptomatic               As above, continue current medications and maintain follow up with specialists.  Return to clinic in 6 months.      Frederick W Dantagnan [facility] [place]

## 2020-11-05 NOTE — TELEPHONE ENCOUNTER
----- Message from Humera Espinoza sent at 11/5/2020  4:22 PM CST -----  Contact: [phone] or [phone]/self  Who Called: PT  Regarding: speak with nurse about rx   Would the patient rather a call back or a response via [employer]? Call back  Best Call Back Number: [phone] or [phone]  Additional Information: location of rx

## 2020-11-20 ENCOUNTER — TELEPHONE (OUTPATIENT)
Dept: FAMILY MEDICINE | Facility: CLINIC | Age: 65
End: 2020-11-20

## 2020-11-20 NOTE — TELEPHONE ENCOUNTER
----- Message from Ag Das sent at 11/20/2020 12:37 PM CST -----  Type:  Needs Medical Advice    Who Called: Swati (niece)  Symptoms (please be specific): pts niece is requesting a call back from Aut[facility]  How long has patient had these symptoms:  unknown  Pharmacy name and phone #:  n/a  Would the patient rather a call back or a response via [facility]? Call back  Best Call Back Number:  [phone]  Additional Information: none

## 2020-12-31 ENCOUNTER — HOSPITAL ENCOUNTER (INPATIENT)
Facility: HOSPITAL | Age: 65
LOS: 1 days | Discharge: HOME | DRG: 178 | End: 2021-01-02
Attending: EMERGENCY MEDICINE | Admitting: HOSPITALIST
Payer: MEDICARE

## 2020-12-31 DIAGNOSIS — U07.1 COVID-19 VIRUS INFECTION: Primary | ICD-10-CM

## 2020-12-31 DIAGNOSIS — Z20.822 SUSPECTED COVID-19 VIRUS INFECTION: ICD-10-CM

## 2020-12-31 LAB
ALBUMIN SERPL BCP-MCNC: 3.3 G/DL (ref 3.5–5.2)
ALP SERPL-CCNC: 83 U/L (ref 55–135)
ALT SERPL W/O P-5'-P-CCNC: 19 U/L (ref 10–44)
ANION GAP SERPL CALC-SCNC: 13 MMOL/L (ref 8–16)
AST SERPL-CCNC: 32 U/L (ref 10–40)
BASOPHILS # BLD AUTO: 0.02 K/UL (ref 0–0.2)
BASOPHILS NFR BLD: 0.2 % (ref 0–1.9)
BILIRUB SERPL-MCNC: 0.3 MG/DL (ref 0.1–1)
BNP SERPL-MCNC: 56 PG/ML (ref 0–99)
BUN SERPL-MCNC: 18 MG/DL (ref 8–23)
CALCIUM SERPL-MCNC: 8.7 MG/DL (ref 8.7–10.5)
CHLORIDE SERPL-SCNC: 105 MMOL/L (ref 95–110)
CK SERPL-CCNC: 264 U/L (ref 20–180)
CO2 SERPL-SCNC: 22 MMOL/L (ref 23–29)
CREAT SERPL-MCNC: 0.7 MG/DL (ref 0.5–1.4)
CRP SERPL-MCNC: 56.3 MG/L (ref 0–8.2)
CTP QC/QA: YES
D DIMER PPP IA.FEU-MCNC: 0.58 MG/L FEU
DIFFERENTIAL METHOD: ABNORMAL
EOSINOPHIL # BLD AUTO: 0 K/UL (ref 0–0.5)
EOSINOPHIL NFR BLD: 0.1 % (ref 0–8)
ERYTHROCYTE [DISTWIDTH] IN BLOOD BY AUTOMATED COUNT: 13.7 % (ref 11.5–14.5)
EST. GFR  (AFRICAN AMERICAN): >60 ML/MIN/1.73 M^2
EST. GFR  (NON AFRICAN AMERICAN): >60 ML/MIN/1.73 M^2
FERRITIN SERPL-MCNC: 212 NG/ML (ref 20–300)
GLUCOSE SERPL-MCNC: 182 MG/DL (ref 70–110)
HCT VFR BLD AUTO: 43.4 % (ref 37–48.5)
HGB BLD-MCNC: 13.7 G/DL (ref 12–16)
IMM GRANULOCYTES # BLD AUTO: 0.05 K/UL (ref 0–0.04)
IMM GRANULOCYTES NFR BLD AUTO: 0.6 % (ref 0–0.5)
LACTATE SERPL-SCNC: 2.2 MMOL/L (ref 0.5–2.2)
LDH SERPL L TO P-CCNC: 479 U/L (ref 110–260)
LYMPHOCYTES # BLD AUTO: 0.7 K/UL (ref 1–4.8)
LYMPHOCYTES NFR BLD: 7.9 % (ref 18–48)
MCH RBC QN AUTO: 30.5 PG (ref 27–31)
MCHC RBC AUTO-ENTMCNC: 31.6 G/DL (ref 32–36)
MCV RBC AUTO: 97 FL (ref 82–98)
MONOCYTES # BLD AUTO: 0.3 K/UL (ref 0.3–1)
MONOCYTES NFR BLD: 3.5 % (ref 4–15)
NEUTROPHILS # BLD AUTO: 7.9 K/UL (ref 1.8–7.7)
NEUTROPHILS NFR BLD: 87.7 % (ref 38–73)
NRBC BLD-RTO: 0 /100 WBC
PLATELET # BLD AUTO: 236 K/UL (ref 150–350)
PMV BLD AUTO: 9.7 FL (ref 9.2–12.9)
POTASSIUM SERPL-SCNC: 4.5 MMOL/L (ref 3.5–5.1)
PROCALCITONIN SERPL IA-MCNC: 0.02 NG/ML
PROT SERPL-MCNC: 7.1 G/DL (ref 6–8.4)
RBC # BLD AUTO: 4.49 M/UL (ref 4–5.4)
SARS-COV-2 RDRP RESP QL NAA+PROBE: POSITIVE
SODIUM SERPL-SCNC: 140 MMOL/L (ref 136–145)
TROPONIN I SERPL DL<=0.01 NG/ML-MCNC: <0.006 NG/ML (ref 0–0.03)
WBC # BLD AUTO: 8.96 K/UL (ref 3.9–12.7)

## 2020-12-31 PROCEDURE — 85025 COMPLETE CBC W/AUTO DIFF WBC: CPT

## 2020-12-31 PROCEDURE — 85379 FIBRIN DEGRADATION QUANT: CPT

## 2020-12-31 PROCEDURE — 63600175 PHARM REV CODE 636 W HCPCS: Performed by: HOSPITALIST

## 2020-12-31 PROCEDURE — 93005 ELECTROCARDIOGRAM TRACING: CPT

## 2020-12-31 PROCEDURE — 83880 ASSAY OF NATRIURETIC PEPTIDE: CPT

## 2020-12-31 PROCEDURE — 99220 PR INITIAL OBSERVATION CARE,LEVL III: ICD-10-PCS | Mod: CR,,, | Performed by: HOSPITALIST

## 2020-12-31 PROCEDURE — 84145 PROCALCITONIN (PCT): CPT

## 2020-12-31 PROCEDURE — 80053 COMPREHEN METABOLIC PANEL: CPT

## 2020-12-31 PROCEDURE — 82728 ASSAY OF FERRITIN: CPT

## 2020-12-31 PROCEDURE — 94761 N-INVAS EAR/PLS OXIMETRY MLT: CPT

## 2020-12-31 PROCEDURE — G0378 HOSPITAL OBSERVATION PER HR: HCPCS

## 2020-12-31 PROCEDURE — 86140 C-REACTIVE PROTEIN: CPT

## 2020-12-31 PROCEDURE — 63600175 PHARM REV CODE 636 W HCPCS: Performed by: STUDENT IN AN ORGANIZED HEALTH CARE EDUCATION/TRAINING PROGRAM

## 2020-12-31 PROCEDURE — 99285 EMERGENCY DEPT VISIT HI MDM: CPT | Mod: CS,CR,, | Performed by: EMERGENCY MEDICINE

## 2020-12-31 PROCEDURE — 93010 EKG 12-LEAD: ICD-10-PCS | Mod: ,,, | Performed by: INTERNAL MEDICINE

## 2020-12-31 PROCEDURE — 99285 PR EMERGENCY DEPT VISIT,LEVEL V: ICD-10-PCS | Mod: CS,CR,, | Performed by: EMERGENCY MEDICINE

## 2020-12-31 PROCEDURE — 82550 ASSAY OF CK (CPK): CPT

## 2020-12-31 PROCEDURE — 83615 LACTATE (LD) (LDH) ENZYME: CPT

## 2020-12-31 PROCEDURE — 99285 EMERGENCY DEPT VISIT HI MDM: CPT | Mod: 25

## 2020-12-31 PROCEDURE — U0002 COVID-19 LAB TEST NON-CDC: HCPCS | Performed by: STUDENT IN AN ORGANIZED HEALTH CARE EDUCATION/TRAINING PROGRAM

## 2020-12-31 PROCEDURE — 25000003 PHARM REV CODE 250: Performed by: HOSPITALIST

## 2020-12-31 PROCEDURE — 83605 ASSAY OF LACTIC ACID: CPT

## 2020-12-31 PROCEDURE — 84484 ASSAY OF TROPONIN QUANT: CPT

## 2020-12-31 PROCEDURE — 93010 ELECTROCARDIOGRAM REPORT: CPT | Mod: ,,, | Performed by: INTERNAL MEDICINE

## 2020-12-31 PROCEDURE — 99220 PR INITIAL OBSERVATION CARE,LEVL III: CPT | Mod: CR,,, | Performed by: HOSPITALIST

## 2020-12-31 PROCEDURE — 96372 THER/PROPH/DIAG INJ SC/IM: CPT | Mod: 59

## 2020-12-31 PROCEDURE — 96374 THER/PROPH/DIAG INJ IV PUSH: CPT

## 2020-12-31 RX ORDER — IBUPROFEN 200 MG
24 TABLET ORAL
Status: DISCONTINUED | OUTPATIENT
Start: 2020-12-31 | End: 2021-01-02 | Stop reason: HOSPADM

## 2020-12-31 RX ORDER — ROSUVASTATIN CALCIUM 5 MG/1
5 TABLET, COATED ORAL NIGHTLY
Status: DISCONTINUED | OUTPATIENT
Start: 2020-12-31 | End: 2021-01-02 | Stop reason: HOSPADM

## 2020-12-31 RX ORDER — PANTOPRAZOLE SODIUM 40 MG/1
40 TABLET, DELAYED RELEASE ORAL DAILY
Status: DISCONTINUED | OUTPATIENT
Start: 2021-01-01 | End: 2021-01-02 | Stop reason: HOSPADM

## 2020-12-31 RX ORDER — ENOXAPARIN SODIUM 100 MG/ML
40 INJECTION SUBCUTANEOUS EVERY 24 HOURS
Status: DISCONTINUED | OUTPATIENT
Start: 2020-12-31 | End: 2021-01-02 | Stop reason: HOSPADM

## 2020-12-31 RX ORDER — ASCORBIC ACID 250 MG
250 TABLET ORAL DAILY
Status: DISCONTINUED | OUTPATIENT
Start: 2021-01-01 | End: 2021-01-02 | Stop reason: HOSPADM

## 2020-12-31 RX ORDER — TALC
6 POWDER (GRAM) TOPICAL NIGHTLY PRN
Status: DISCONTINUED | OUTPATIENT
Start: 2020-12-31 | End: 2020-12-31

## 2020-12-31 RX ORDER — ACETAMINOPHEN 325 MG/1
650 TABLET ORAL EVERY 4 HOURS PRN
Status: DISCONTINUED | OUTPATIENT
Start: 2020-12-31 | End: 2021-01-02 | Stop reason: HOSPADM

## 2020-12-31 RX ORDER — MESALAMINE 1.2 G/1
2.4 TABLET, DELAYED RELEASE ORAL
Status: DISCONTINUED | OUTPATIENT
Start: 2021-01-01 | End: 2021-01-02 | Stop reason: HOSPADM

## 2020-12-31 RX ORDER — ONDANSETRON 2 MG/ML
4 INJECTION INTRAMUSCULAR; INTRAVENOUS EVERY 8 HOURS PRN
Status: DISCONTINUED | OUTPATIENT
Start: 2020-12-31 | End: 2021-01-02 | Stop reason: HOSPADM

## 2020-12-31 RX ORDER — SODIUM CHLORIDE 0.9 % (FLUSH) 0.9 %
10 SYRINGE (ML) INJECTION
Status: DISCONTINUED | OUTPATIENT
Start: 2020-12-31 | End: 2021-01-02 | Stop reason: HOSPADM

## 2020-12-31 RX ORDER — METOPROLOL SUCCINATE 25 MG/1
25 TABLET, EXTENDED RELEASE ORAL DAILY
Status: DISCONTINUED | OUTPATIENT
Start: 2021-01-01 | End: 2020-12-31

## 2020-12-31 RX ORDER — LISINOPRIL 10 MG/1
10 TABLET ORAL DAILY
Status: DISCONTINUED | OUTPATIENT
Start: 2021-01-01 | End: 2021-01-02 | Stop reason: HOSPADM

## 2020-12-31 RX ORDER — DEXAMETHASONE SODIUM PHOSPHATE 4 MG/ML
6 INJECTION, SOLUTION INTRA-ARTICULAR; INTRALESIONAL; INTRAMUSCULAR; INTRAVENOUS; SOFT TISSUE
Status: COMPLETED | OUTPATIENT
Start: 2020-12-31 | End: 2020-12-31

## 2020-12-31 RX ORDER — ASPIRIN 81 MG/1
81 TABLET ORAL DAILY
Status: DISCONTINUED | OUTPATIENT
Start: 2021-01-01 | End: 2021-01-02 | Stop reason: HOSPADM

## 2020-12-31 RX ORDER — IBUPROFEN 200 MG
16 TABLET ORAL
Status: DISCONTINUED | OUTPATIENT
Start: 2020-12-31 | End: 2021-01-02 | Stop reason: HOSPADM

## 2020-12-31 RX ORDER — TALC
6 POWDER (GRAM) TOPICAL NIGHTLY PRN
Status: DISCONTINUED | OUTPATIENT
Start: 2020-12-31 | End: 2021-01-02 | Stop reason: HOSPADM

## 2020-12-31 RX ORDER — ROSUVASTATIN CALCIUM 5 MG/1
5 TABLET, COATED ORAL DAILY
Status: DISCONTINUED | OUTPATIENT
Start: 2021-01-01 | End: 2020-12-31

## 2020-12-31 RX ORDER — METOPROLOL SUCCINATE 25 MG/1
25 TABLET, EXTENDED RELEASE ORAL NIGHTLY
Status: DISCONTINUED | OUTPATIENT
Start: 2020-12-31 | End: 2021-01-02 | Stop reason: HOSPADM

## 2020-12-31 RX ADMIN — METOPROLOL SUCCINATE 25 MG: 25 TABLET, EXTENDED RELEASE ORAL at 11:12

## 2020-12-31 RX ADMIN — ROSUVASTATIN CALCIUM 5 MG: 5 TABLET, COATED ORAL at 11:12

## 2020-12-31 RX ADMIN — DEXAMETHASONE SODIUM PHOSPHATE 6 MG: 4 INJECTION INTRA-ARTICULAR; INTRALESIONAL; INTRAMUSCULAR; INTRAVENOUS; SOFT TISSUE at 04:12

## 2020-12-31 RX ADMIN — ENOXAPARIN SODIUM 40 MG: 100 INJECTION SUBCUTANEOUS at 11:12

## 2021-01-01 LAB
25(OH)D3+25(OH)D2 SERPL-MCNC: 56 NG/ML (ref 30–96)
ALBUMIN SERPL BCP-MCNC: 3 G/DL (ref 3.5–5.2)
ALP SERPL-CCNC: 74 U/L (ref 55–135)
ALT SERPL W/O P-5'-P-CCNC: 18 U/L (ref 10–44)
ANION GAP SERPL CALC-SCNC: 13 MMOL/L (ref 8–16)
AST SERPL-CCNC: 30 U/L (ref 10–40)
BASOPHILS # BLD AUTO: 0.01 K/UL (ref 0–0.2)
BASOPHILS NFR BLD: 0.1 % (ref 0–1.9)
BILIRUB SERPL-MCNC: 0.2 MG/DL (ref 0.1–1)
BUN SERPL-MCNC: 18 MG/DL (ref 8–23)
CALCIUM SERPL-MCNC: 8.9 MG/DL (ref 8.7–10.5)
CHLORIDE SERPL-SCNC: 107 MMOL/L (ref 95–110)
CO2 SERPL-SCNC: 23 MMOL/L (ref 23–29)
CREAT SERPL-MCNC: 0.7 MG/DL (ref 0.5–1.4)
DIFFERENTIAL METHOD: ABNORMAL
EOSINOPHIL # BLD AUTO: 0 K/UL (ref 0–0.5)
EOSINOPHIL NFR BLD: 0 % (ref 0–8)
ERYTHROCYTE [DISTWIDTH] IN BLOOD BY AUTOMATED COUNT: 13.6 % (ref 11.5–14.5)
EST. GFR  (AFRICAN AMERICAN): >60 ML/MIN/1.73 M^2
EST. GFR  (NON AFRICAN AMERICAN): >60 ML/MIN/1.73 M^2
GLUCOSE SERPL-MCNC: 114 MG/DL (ref 70–110)
HCT VFR BLD AUTO: 42.6 % (ref 37–48.5)
HGB BLD-MCNC: 13.3 G/DL (ref 12–16)
IMM GRANULOCYTES # BLD AUTO: 0.05 K/UL (ref 0–0.04)
IMM GRANULOCYTES NFR BLD AUTO: 0.6 % (ref 0–0.5)
LYMPHOCYTES # BLD AUTO: 0.7 K/UL (ref 1–4.8)
LYMPHOCYTES NFR BLD: 8.7 % (ref 18–48)
MCH RBC QN AUTO: 29.9 PG (ref 27–31)
MCHC RBC AUTO-ENTMCNC: 31.2 G/DL (ref 32–36)
MCV RBC AUTO: 96 FL (ref 82–98)
MONOCYTES # BLD AUTO: 0.3 K/UL (ref 0.3–1)
MONOCYTES NFR BLD: 3.8 % (ref 4–15)
NEUTROPHILS # BLD AUTO: 6.9 K/UL (ref 1.8–7.7)
NEUTROPHILS NFR BLD: 86.8 % (ref 38–73)
NRBC BLD-RTO: 0 /100 WBC
PLATELET # BLD AUTO: 236 K/UL (ref 150–350)
PMV BLD AUTO: 9.5 FL (ref 9.2–12.9)
POTASSIUM SERPL-SCNC: 4.4 MMOL/L (ref 3.5–5.1)
PROT SERPL-MCNC: 7.4 G/DL (ref 6–8.4)
RBC # BLD AUTO: 4.45 M/UL (ref 4–5.4)
SODIUM SERPL-SCNC: 143 MMOL/L (ref 136–145)
WBC # BLD AUTO: 7.97 K/UL (ref 3.9–12.7)

## 2021-01-01 PROCEDURE — 82306 VITAMIN D 25 HYDROXY: CPT

## 2021-01-01 PROCEDURE — 25000003 PHARM REV CODE 250: Performed by: INTERNAL MEDICINE

## 2021-01-01 PROCEDURE — 85025 COMPLETE CBC W/AUTO DIFF WBC: CPT

## 2021-01-01 PROCEDURE — 25000003 PHARM REV CODE 250: Performed by: STUDENT IN AN ORGANIZED HEALTH CARE EDUCATION/TRAINING PROGRAM

## 2021-01-01 PROCEDURE — 63600175 PHARM REV CODE 636 W HCPCS: Performed by: HOSPITALIST

## 2021-01-01 PROCEDURE — 96375 TX/PRO/DX INJ NEW DRUG ADDON: CPT

## 2021-01-01 PROCEDURE — 99225 PR SUBSEQUENT OBSERVATION CARE,LEVEL II: CPT | Mod: CR,,, | Performed by: INTERNAL MEDICINE

## 2021-01-01 PROCEDURE — 36415 COLL VENOUS BLD VENIPUNCTURE: CPT

## 2021-01-01 PROCEDURE — 80053 COMPREHEN METABOLIC PANEL: CPT

## 2021-01-01 PROCEDURE — 96372 THER/PROPH/DIAG INJ SC/IM: CPT

## 2021-01-01 PROCEDURE — 25000003 PHARM REV CODE 250: Performed by: HOSPITALIST

## 2021-01-01 PROCEDURE — 99225 PR SUBSEQUENT OBSERVATION CARE,LEVEL II: ICD-10-PCS | Mod: CR,,, | Performed by: INTERNAL MEDICINE

## 2021-01-01 RX ADMIN — METOPROLOL SUCCINATE 25 MG: 25 TABLET, EXTENDED RELEASE ORAL at 08:01

## 2021-01-01 RX ADMIN — REMDESIVIR 200 MG: 100 INJECTION, POWDER, LYOPHILIZED, FOR SOLUTION INTRAVENOUS at 04:01

## 2021-01-01 RX ADMIN — DEXAMETHASONE 6 MG: 4 TABLET ORAL at 09:01

## 2021-01-01 RX ADMIN — PANTOPRAZOLE SODIUM 40 MG: 40 TABLET, DELAYED RELEASE ORAL at 09:01

## 2021-01-01 RX ADMIN — Medication 250 MG: at 09:01

## 2021-01-01 RX ADMIN — ENOXAPARIN SODIUM 40 MG: 100 INJECTION SUBCUTANEOUS at 04:01

## 2021-01-01 RX ADMIN — MELATONIN TAB 3 MG 6 MG: 3 TAB at 08:01

## 2021-01-01 RX ADMIN — ROSUVASTATIN CALCIUM 5 MG: 5 TABLET, COATED ORAL at 08:01

## 2021-01-01 RX ADMIN — ASPIRIN 81 MG: 81 TABLET, COATED ORAL at 09:01

## 2021-01-01 RX ADMIN — LISINOPRIL 10 MG: 10 TABLET ORAL at 09:01

## 2021-01-01 RX ADMIN — MESALAMINE 2.4 G: 1.2 TABLET, DELAYED RELEASE ORAL at 11:01

## 2021-01-02 VITALS
DIASTOLIC BLOOD PRESSURE: 76 MMHG | HEART RATE: 63 BPM | TEMPERATURE: 97 F | BODY MASS INDEX: 47.11 KG/M2 | HEIGHT: 62 IN | WEIGHT: 256 LBS | SYSTOLIC BLOOD PRESSURE: 145 MMHG | OXYGEN SATURATION: 93 % | RESPIRATION RATE: 18 BRPM

## 2021-01-02 LAB
ALBUMIN SERPL BCP-MCNC: 2.8 G/DL (ref 3.5–5.2)
ALP SERPL-CCNC: 69 U/L (ref 55–135)
ALT SERPL W/O P-5'-P-CCNC: 16 U/L (ref 10–44)
ANION GAP SERPL CALC-SCNC: 9 MMOL/L (ref 8–16)
AST SERPL-CCNC: 22 U/L (ref 10–40)
BASOPHILS # BLD AUTO: 0.01 K/UL (ref 0–0.2)
BASOPHILS NFR BLD: 0.1 % (ref 0–1.9)
BILIRUB SERPL-MCNC: 0.2 MG/DL (ref 0.1–1)
BUN SERPL-MCNC: 24 MG/DL (ref 8–23)
CALCIUM SERPL-MCNC: 8.3 MG/DL (ref 8.7–10.5)
CHLORIDE SERPL-SCNC: 106 MMOL/L (ref 95–110)
CK SERPL-CCNC: 202 U/L (ref 20–180)
CO2 SERPL-SCNC: 25 MMOL/L (ref 23–29)
CREAT SERPL-MCNC: 0.6 MG/DL (ref 0.5–1.4)
CRP SERPL-MCNC: 35.3 MG/L (ref 0–8.2)
D DIMER PPP IA.FEU-MCNC: 1.26 MG/L FEU
DIFFERENTIAL METHOD: ABNORMAL
EOSINOPHIL # BLD AUTO: 0 K/UL (ref 0–0.5)
EOSINOPHIL NFR BLD: 0 % (ref 0–8)
ERYTHROCYTE [DISTWIDTH] IN BLOOD BY AUTOMATED COUNT: 13.6 % (ref 11.5–14.5)
ERYTHROCYTE [SEDIMENTATION RATE] IN BLOOD BY WESTERGREN METHOD: 44 MM/HR (ref 0–36)
EST. GFR  (AFRICAN AMERICAN): >60 ML/MIN/1.73 M^2
EST. GFR  (NON AFRICAN AMERICAN): >60 ML/MIN/1.73 M^2
FERRITIN SERPL-MCNC: 158 NG/ML (ref 20–300)
GLUCOSE SERPL-MCNC: 93 MG/DL (ref 70–110)
HCT VFR BLD AUTO: 42.3 % (ref 37–48.5)
HGB BLD-MCNC: 13.1 G/DL (ref 12–16)
IMM GRANULOCYTES # BLD AUTO: 0.07 K/UL (ref 0–0.04)
IMM GRANULOCYTES NFR BLD AUTO: 1 % (ref 0–0.5)
LDH SERPL L TO P-CCNC: 466 U/L (ref 110–260)
LYMPHOCYTES # BLD AUTO: 1.1 K/UL (ref 1–4.8)
LYMPHOCYTES NFR BLD: 15.7 % (ref 18–48)
MCH RBC QN AUTO: 30.7 PG (ref 27–31)
MCHC RBC AUTO-ENTMCNC: 31 G/DL (ref 32–36)
MCV RBC AUTO: 99 FL (ref 82–98)
MONOCYTES # BLD AUTO: 0.4 K/UL (ref 0.3–1)
MONOCYTES NFR BLD: 5.9 % (ref 4–15)
NEUTROPHILS # BLD AUTO: 5.5 K/UL (ref 1.8–7.7)
NEUTROPHILS NFR BLD: 77.3 % (ref 38–73)
NRBC BLD-RTO: 0 /100 WBC
PLATELET # BLD AUTO: 218 K/UL (ref 150–350)
PMV BLD AUTO: 9.6 FL (ref 9.2–12.9)
POTASSIUM SERPL-SCNC: 3.9 MMOL/L (ref 3.5–5.1)
PROT SERPL-MCNC: 6.6 G/DL (ref 6–8.4)
RBC # BLD AUTO: 4.27 M/UL (ref 4–5.4)
SODIUM SERPL-SCNC: 140 MMOL/L (ref 136–145)
WBC # BLD AUTO: 7.14 K/UL (ref 3.9–12.7)

## 2021-01-02 PROCEDURE — 36415 COLL VENOUS BLD VENIPUNCTURE: CPT

## 2021-01-02 PROCEDURE — 85652 RBC SED RATE AUTOMATED: CPT

## 2021-01-02 PROCEDURE — 25000003 PHARM REV CODE 250: Performed by: HOSPITALIST

## 2021-01-02 PROCEDURE — 82550 ASSAY OF CK (CPK): CPT

## 2021-01-02 PROCEDURE — 80053 COMPREHEN METABOLIC PANEL: CPT

## 2021-01-02 PROCEDURE — 83615 LACTATE (LD) (LDH) ENZYME: CPT

## 2021-01-02 PROCEDURE — 63600175 PHARM REV CODE 636 W HCPCS: Performed by: HOSPITALIST

## 2021-01-02 PROCEDURE — 99239 HOSP IP/OBS DSCHRG MGMT >30: CPT | Mod: CR,,, | Performed by: INTERNAL MEDICINE

## 2021-01-02 PROCEDURE — 99239 PR HOSPITAL DISCHARGE DAY,>30 MIN: ICD-10-PCS | Mod: CR,,, | Performed by: INTERNAL MEDICINE

## 2021-01-02 PROCEDURE — 20600001 HC STEP DOWN PRIVATE ROOM

## 2021-01-02 PROCEDURE — 86140 C-REACTIVE PROTEIN: CPT

## 2021-01-02 PROCEDURE — 85379 FIBRIN DEGRADATION QUANT: CPT

## 2021-01-02 PROCEDURE — 82728 ASSAY OF FERRITIN: CPT

## 2021-01-02 PROCEDURE — 85025 COMPLETE CBC W/AUTO DIFF WBC: CPT

## 2021-01-02 PROCEDURE — 25000003 PHARM REV CODE 250: Performed by: INTERNAL MEDICINE

## 2021-01-02 RX ADMIN — DEXAMETHASONE 6 MG: 4 TABLET ORAL at 09:01

## 2021-01-02 RX ADMIN — ASPIRIN 81 MG: 81 TABLET, COATED ORAL at 09:01

## 2021-01-02 RX ADMIN — PANTOPRAZOLE SODIUM 40 MG: 40 TABLET, DELAYED RELEASE ORAL at 09:01

## 2021-01-02 RX ADMIN — LISINOPRIL 10 MG: 10 TABLET ORAL at 09:01

## 2021-01-02 RX ADMIN — REMDESIVIR 100 MG: 100 INJECTION, POWDER, LYOPHILIZED, FOR SOLUTION INTRAVENOUS at 03:01

## 2021-01-02 RX ADMIN — Medication 250 MG: at 09:01

## 2021-01-04 ENCOUNTER — TELEPHONE (OUTPATIENT)
Dept: ADMINISTRATIVE | Facility: CLINIC | Age: 66
End: 2021-01-04

## 2021-01-06 ENCOUNTER — PATIENT OUTREACH (OUTPATIENT)
Dept: ADMINISTRATIVE | Facility: CLINIC | Age: 66
End: 2021-01-06

## 2021-01-07 ENCOUNTER — TELEPHONE (OUTPATIENT)
Dept: ADMINISTRATIVE | Facility: CLINIC | Age: 66
End: 2021-01-07

## 2021-01-07 ENCOUNTER — PATIENT OUTREACH (OUTPATIENT)
Dept: ADMINISTRATIVE | Facility: HOSPITAL | Age: 66
End: 2021-01-07

## 2021-01-08 ENCOUNTER — NURSE TRIAGE (OUTPATIENT)
Dept: ADMINISTRATIVE | Facility: CLINIC | Age: 66
End: 2021-01-08

## 2021-01-12 ENCOUNTER — TELEPHONE (OUTPATIENT)
Dept: ADMINISTRATIVE | Facility: HOSPITAL | Age: 66
End: 2021-01-12

## 2021-01-21 ENCOUNTER — OFFICE VISIT (OUTPATIENT)
Dept: FAMILY MEDICINE | Facility: CLINIC | Age: 66
End: 2021-01-21
Payer: MEDICARE

## 2021-01-21 VITALS
BODY MASS INDEX: 47.6 KG/M2 | OXYGEN SATURATION: 96 % | HEIGHT: 62 IN | SYSTOLIC BLOOD PRESSURE: 134 MMHG | RESPIRATION RATE: 18 BRPM | HEART RATE: 79 BPM | TEMPERATURE: 97 F | DIASTOLIC BLOOD PRESSURE: 90 MMHG | WEIGHT: 258.69 LBS

## 2021-01-21 DIAGNOSIS — U07.1 COVID-19 VIRUS INFECTION: ICD-10-CM

## 2021-01-21 PROCEDURE — 99999 PR PBB SHADOW E&M-EST. PATIENT-LVL IV: ICD-10-PCS | Mod: PBBFAC,CR,, | Performed by: INTERNAL MEDICINE

## 2021-01-21 PROCEDURE — 99999 PR PBB SHADOW E&M-EST. PATIENT-LVL IV: CPT | Mod: PBBFAC,CR,, | Performed by: INTERNAL MEDICINE

## 2021-01-21 PROCEDURE — 99214 PR OFFICE/OUTPT VISIT, EST, LEVL IV, 30-39 MIN: ICD-10-PCS | Mod: S$PBB,CR,, | Performed by: INTERNAL MEDICINE

## 2021-01-21 PROCEDURE — 99214 OFFICE O/P EST MOD 30 MIN: CPT | Mod: S$PBB,CR,, | Performed by: INTERNAL MEDICINE

## 2021-01-21 PROCEDURE — 99214 OFFICE O/P EST MOD 30 MIN: CPT | Mod: PBBFAC,PN | Performed by: INTERNAL MEDICINE

## 2021-01-21 RX ORDER — ALBUTEROL SULFATE 90 UG/1
AEROSOL, METERED RESPIRATORY (INHALATION)
COMMUNITY
Start: 2020-12-29 | End: 2024-01-09

## 2021-03-26 ENCOUNTER — IMMUNIZATION (OUTPATIENT)
Dept: PRIMARY CARE CLINIC | Facility: CLINIC | Age: 66
End: 2021-03-26
Payer: MEDICARE

## 2021-03-26 DIAGNOSIS — Z23 NEED FOR VACCINATION: Primary | ICD-10-CM

## 2021-03-26 PROCEDURE — 0011A COVID-19, MRNA, LNP-S, PF, 100 MCG/0.5 ML DOSE VACCINE: ICD-10-PCS | Mod: CV19,S$GLB,, | Performed by: FAMILY MEDICINE

## 2021-03-26 PROCEDURE — 0011A COVID-19, MRNA, LNP-S, PF, 100 MCG/0.5 ML DOSE VACCINE: CPT | Mod: CV19,S$GLB,, | Performed by: FAMILY MEDICINE

## 2021-03-26 PROCEDURE — 91301 COVID-19, MRNA, LNP-S, PF, 100 MCG/0.5 ML DOSE VACCINE: ICD-10-PCS | Mod: S$GLB,,, | Performed by: FAMILY MEDICINE

## 2021-03-26 PROCEDURE — 91301 COVID-19, MRNA, LNP-S, PF, 100 MCG/0.5 ML DOSE VACCINE: CPT | Mod: S$GLB,,, | Performed by: FAMILY MEDICINE

## 2021-04-21 DIAGNOSIS — I11.9 HYPERTENSIVE HEART DISEASE WITHOUT HEART FAILURE: ICD-10-CM

## 2021-04-21 DIAGNOSIS — K21.9 GASTROESOPHAGEAL REFLUX DISEASE WITHOUT ESOPHAGITIS: Chronic | ICD-10-CM

## 2021-04-22 RX ORDER — OMEPRAZOLE 20 MG/1
20 CAPSULE, DELAYED RELEASE ORAL DAILY
Qty: 90 CAPSULE | Refills: 3 | Status: SHIPPED | OUTPATIENT
Start: 2021-04-22 | End: 2022-04-18

## 2021-04-22 RX ORDER — LISINOPRIL 10 MG/1
10 TABLET ORAL DAILY
Qty: 90 TABLET | Refills: 3 | Status: SHIPPED | OUTPATIENT
Start: 2021-04-22 | End: 2022-04-18

## 2021-04-24 ENCOUNTER — IMMUNIZATION (OUTPATIENT)
Dept: PRIMARY CARE CLINIC | Facility: CLINIC | Age: 66
End: 2021-04-24
Payer: MEDICARE

## 2021-04-24 DIAGNOSIS — Z23 NEED FOR VACCINATION: Primary | ICD-10-CM

## 2021-04-24 PROCEDURE — 0012A COVID-19, MRNA, LNP-S, PF, 100 MCG/0.5 ML DOSE VACCINE: CPT | Mod: CV19,S$GLB,, | Performed by: EMERGENCY MEDICINE

## 2021-04-24 PROCEDURE — 91301 COVID-19, MRNA, LNP-S, PF, 100 MCG/0.5 ML DOSE VACCINE: CPT | Mod: S$GLB,,, | Performed by: EMERGENCY MEDICINE

## 2021-04-24 PROCEDURE — 0012A COVID-19, MRNA, LNP-S, PF, 100 MCG/0.5 ML DOSE VACCINE: ICD-10-PCS | Mod: CV19,S$GLB,, | Performed by: EMERGENCY MEDICINE

## 2021-04-24 PROCEDURE — 91301 COVID-19, MRNA, LNP-S, PF, 100 MCG/0.5 ML DOSE VACCINE: ICD-10-PCS | Mod: S$GLB,,, | Performed by: EMERGENCY MEDICINE

## 2021-05-05 ENCOUNTER — OFFICE VISIT (OUTPATIENT)
Dept: FAMILY MEDICINE | Facility: CLINIC | Age: 66
End: 2021-05-05
Payer: MEDICARE

## 2021-05-05 VITALS
HEIGHT: 62 IN | HEART RATE: 64 BPM | WEIGHT: 265.19 LBS | BODY MASS INDEX: 48.8 KG/M2 | DIASTOLIC BLOOD PRESSURE: 84 MMHG | SYSTOLIC BLOOD PRESSURE: 124 MMHG | TEMPERATURE: 98 F | RESPIRATION RATE: 18 BRPM | OXYGEN SATURATION: 95 %

## 2021-05-05 DIAGNOSIS — E66.01 MORBID OBESITY WITH BODY MASS INDEX (BMI) OF 45.0 TO 49.9 IN ADULT: ICD-10-CM

## 2021-05-05 DIAGNOSIS — I25.10 ASCVD (ARTERIOSCLEROTIC CARDIOVASCULAR DISEASE): ICD-10-CM

## 2021-05-05 DIAGNOSIS — I11.9 HYPERTENSIVE HEART DISEASE WITHOUT HEART FAILURE: Primary | ICD-10-CM

## 2021-05-05 DIAGNOSIS — K51.919 ULCERATIVE COLITIS WITH COMPLICATION, UNSPECIFIED LOCATION: Chronic | ICD-10-CM

## 2021-05-05 DIAGNOSIS — E78.2 MIXED HYPERLIPIDEMIA: ICD-10-CM

## 2021-05-05 PROCEDURE — 99999 PR PBB SHADOW E&M-EST. PATIENT-LVL IV: CPT | Mod: PBBFAC,,, | Performed by: INTERNAL MEDICINE

## 2021-05-05 PROCEDURE — 99999 PR PBB SHADOW E&M-EST. PATIENT-LVL IV: ICD-10-PCS | Mod: PBBFAC,,, | Performed by: INTERNAL MEDICINE

## 2021-05-05 PROCEDURE — 99214 PR OFFICE/OUTPT VISIT, EST, LEVL IV, 30-39 MIN: ICD-10-PCS | Mod: S$PBB,,, | Performed by: INTERNAL MEDICINE

## 2021-05-05 PROCEDURE — 99214 OFFICE O/P EST MOD 30 MIN: CPT | Mod: S$PBB,,, | Performed by: INTERNAL MEDICINE

## 2021-05-05 PROCEDURE — 99214 OFFICE O/P EST MOD 30 MIN: CPT | Mod: PBBFAC,PN | Performed by: INTERNAL MEDICINE

## 2021-11-15 ENCOUNTER — PATIENT OUTREACH (OUTPATIENT)
Dept: ADMINISTRATIVE | Facility: HOSPITAL | Age: 66
End: 2021-11-15
Payer: MEDICARE

## 2022-04-18 DIAGNOSIS — K21.9 GASTROESOPHAGEAL REFLUX DISEASE WITHOUT ESOPHAGITIS: Chronic | ICD-10-CM

## 2022-04-18 DIAGNOSIS — I11.9 HYPERTENSIVE HEART DISEASE WITHOUT HEART FAILURE: ICD-10-CM

## 2022-04-18 RX ORDER — OMEPRAZOLE 20 MG/1
20 CAPSULE, DELAYED RELEASE ORAL DAILY
Qty: 90 CAPSULE | Refills: 0 | Status: SHIPPED | OUTPATIENT
Start: 2022-04-18 | End: 2022-06-27 | Stop reason: SDUPTHER

## 2022-04-19 RX ORDER — LISINOPRIL 10 MG/1
10 TABLET ORAL DAILY
Qty: 90 TABLET | Refills: 3 | Status: SHIPPED | OUTPATIENT
Start: 2022-04-19 | End: 2022-06-27

## 2022-04-19 NOTE — TELEPHONE ENCOUNTER
Refill Routing Note   Medication(s) are not appropriate for processing by [facility] for the following reason(s):      - Required laboratory values are outdated    [facility] action(s):  Defer  Approve Medication-related problems identified:   Requires labs  Requires appointment     Medication Therapy Plan: Needs an OV with PCP, Labs (CMP) overdue; DEFER lisinopril (labs ) APPROVE omeprazole  Medication reconciliation completed: No     Appointments  past 12m or future 3m with PCP    Date Provider   Last Visit   2021 Eliceo Beatty MD   Next Visit   Visit date not found Eliceo Beatty MD   ED visits in past 90 days: 0        Note composed:8:25 PM 2022

## 2022-04-19 NOTE — TELEPHONE ENCOUNTER
Care Due:                  Date            Visit Type   Department     Provider  --------------------------------------------------------------------------------                                ESTABLISHED                              PATIENT      DESC FAMILY  Last Visit: 05-      [facility]  Eliceo Beatty  Next Visit: None Scheduled  None         None Found                                                            Last  Test          Frequency    Reason                     Performed    Due Date  --------------------------------------------------------------------------------    Office Visit  12 months..  lisinopriL, omeprazole...  05- 04-    CMP.........  12 months..  lisinopriL...............  01- 12-    Powered by [employer]. Reference number: [id number].   4/18/2022 8:18:35 PM CDT

## 2022-05-16 ENCOUNTER — OFFICE VISIT (OUTPATIENT)
Dept: INTERNAL MEDICINE | Facility: CLINIC | Age: 67
End: 2022-05-16
Payer: MEDICARE

## 2022-05-16 VITALS
TEMPERATURE: 98 F | HEART RATE: 110 BPM | OXYGEN SATURATION: 98 % | WEIGHT: 261.38 LBS | BODY MASS INDEX: 48.1 KG/M2 | HEIGHT: 62 IN | SYSTOLIC BLOOD PRESSURE: 158 MMHG | DIASTOLIC BLOOD PRESSURE: 88 MMHG | RESPIRATION RATE: 18 BRPM

## 2022-05-16 DIAGNOSIS — M54.50 ACUTE MIDLINE LOW BACK PAIN WITHOUT SCIATICA: ICD-10-CM

## 2022-05-16 DIAGNOSIS — I11.9 HYPERTENSIVE HEART DISEASE WITHOUT HEART FAILURE: ICD-10-CM

## 2022-05-16 DIAGNOSIS — K21.9 GASTROESOPHAGEAL REFLUX DISEASE WITHOUT ESOPHAGITIS: Chronic | ICD-10-CM

## 2022-05-16 DIAGNOSIS — K51.919 ULCERATIVE COLITIS WITH COMPLICATION, UNSPECIFIED LOCATION: Primary | Chronic | ICD-10-CM

## 2022-05-16 DIAGNOSIS — E66.01 MORBID OBESITY WITH BODY MASS INDEX (BMI) OF 45.0 TO 49.9 IN ADULT: ICD-10-CM

## 2022-05-16 DIAGNOSIS — I25.10 ASCVD (ARTERIOSCLEROTIC CARDIOVASCULAR DISEASE): ICD-10-CM

## 2022-05-16 PROCEDURE — 99214 PR OFFICE/OUTPT VISIT, EST, LEVL IV, 30-39 MIN: ICD-10-PCS | Mod: S$PBB,,, | Performed by: INTERNAL MEDICINE

## 2022-05-16 PROCEDURE — 99999 PR PBB SHADOW E&M-EST. PATIENT-LVL IV: ICD-10-PCS | Mod: PBBFAC,,, | Performed by: INTERNAL MEDICINE

## 2022-05-16 PROCEDURE — 99214 OFFICE O/P EST MOD 30 MIN: CPT | Mod: S$PBB,,, | Performed by: INTERNAL MEDICINE

## 2022-05-16 PROCEDURE — 99214 OFFICE O/P EST MOD 30 MIN: CPT | Mod: PBBFAC | Performed by: INTERNAL MEDICINE

## 2022-05-16 PROCEDURE — 99999 PR PBB SHADOW E&M-EST. PATIENT-LVL IV: CPT | Mod: PBBFAC,,, | Performed by: INTERNAL MEDICINE

## 2022-05-16 RX ORDER — HYDROCODONE BITARTRATE AND ACETAMINOPHEN 5; 325 MG/1; MG/1
1 TABLET ORAL EVERY 6 HOURS PRN
Qty: 20 TABLET | Refills: 0 | Status: SHIPPED | OUTPATIENT
Start: 2022-05-16 | End: 2022-12-02

## 2022-05-16 NOTE — PROGRESS NOTES
[facility] Note    Chief Complaint      Chief Complaint   Patient presents with    Follow-up       History of Present Illness      Jenny Caro is a 66 y.o. female who presents today for   Chief Complaint   Patient presents with    Follow-up   .  Patient comes to appointment here for checkup for chronic issues as below . She is seeing new cardiologist dr elida gustafson just had full labs all reviewed by me today .     Problem List Items Addressed This Visit        Cardiac/Vascular    ASCVD (arteriosclerotic cardiovascular disease)    Overview     Dr gustafson managing is now on rosuvastatin  , she is also on asa , and metoprolol and crestor for secondary prevention               Endocrine    Morbid obesity with body mass index (BMI) of 45.0 to 49.9 in adult    Overview     counciled again on diet and exercise              GI    Gastroesophageal reflux disease (Chronic)    Overview     Cont prilosec is working well            Ulcerative colitis - Primary (Chronic)    Overview     Cont lialda . Gi managing dr whitfield . She is asymptomatic                   Past Medical History:  Past Medical History:   Diagnosis Date    Abnormal Pap smear of cervix 2009    LEEP 2009, mild dysplasia normal paps since    Arthritis     ankles  born with club feet    GERD (gastroesophageal reflux disease)     HPV in female     Hyperlipidemia     Hypertension     Ulcerative colitis        Past Surgical History:  Past Surgical History:   Procedure Laterality Date    CERVICAL BIOPSY  W/ LOOP ELECTRODE EXCISION  2009    COLONOSCOPY  2013    approx    CYSTOSCOPY N/A 3/13/2019    Procedure: CYSTOSCOPY;  Surgeon: Rui Lozano DO;  Location: [facility] [place] OR;  Service: OB/GYN;  Laterality: N/A;    DILATION AND CURETTAGE OF UTERUS  11/20/2018    Procedure: DILATION AND CURETTAGE, UTERUS;  Surgeon: Arvind Russell MD;  Location: [facility] [place] OR;  Service: OB/GYN;;    HYSTERECTOMY  03/13/2019    Robotic DVH/ BSO      HYSTEROSCOPY N/A 11/20/2018    Procedure: HYSTEROSCOPY; TRUCLEAR RESECTION OF UTERINE POLYP;  Surgeon: Arvind Russell MD;  Location: [facility] OR;  Service: OB/GYN;  Laterality: N/A;    LAPAROSCOPIC GASTRIC BANDING  2006    POSTERIOR REPAIR N/A 3/13/2019    Procedure: COLPORRHAPHY, POSTERIOR;  Surgeon: Rui Lozano DO;  Location: [facility] OR;  Service: OB/GYN;  Laterality: N/A;    ROBOT-ASSISTED LAPAROSCOPIC ABDOMINAL SACROCOLPOPEXY N/A 3/13/2019    Procedure: ROBOTIC SACROCOLPOPEXY, ABDOMEN;  Surgeon: Rui Lozano DO;  Location: [facility] OR;  Service: OB/GYN;  Laterality: N/A;    ROBOT-ASSISTED LAPAROSCOPIC HYSTERECTOMY N/A 3/13/2019    Procedure: ROBOTIC HYSTERECTOMY;  Surgeon: Arvind Russell MD;  Location: [facility] OR;  Service: OB/GYN;  Laterality: N/A;    ROBOT-ASSISTED LAPAROSCOPIC SALPINGO-OOPHORECTOMY Bilateral 3/13/2019    Procedure: ROBOTIC SALPINGO-OOPHORECTOMY;  Surgeon: Arvind Russell MD;  Location: [facility] OR;  Service: OB/GYN;  Laterality: Bilateral;    SLEEVE GASTROPLASTY  08/2015       Family History:  family history includes Diabetes in her mother and sister; Heart disease in her father; Stroke in her sister.    Social History:  Social History     Socioeconomic History    Marital status:    Occupational History    Occupation: retired   Tobacco Use    Smoking status: Never Smoker    Smokeless tobacco: Never Used   Substance and Sexual Activity    Alcohol use: No    Drug use: No    Sexual activity: Yes     Partners: Male     Birth control/protection: Post-menopausal, Surgical     Comment: :     DVH/BSO   3-13-19       Review of Systems:   ROS      Medications:  Outpatient Encounter Medications as of 5/16/2022   Medication Sig Note Dispense Refill    antiox #8/om3/dha/epa/lut/zeax (PRESERVISION AREDS 2, OMEGA-3, ORAL) Take 1 tablet by mouth once daily.       ascorbic acid, vitamin C, (VITAMIN C) 100 MG tablet Take 100 mg by mouth once daily.       aspirin (ECOTRIN) 81 MG EC  tablet Take 81 mg by mouth once daily.       biotin 1 mg Cap Take by mouth.       calcium citrate-vitamin D3 200 mg calcium -250 unit Tab Take by mouth.       cetirizine (ZYRTEC) 10 MG tablet Take 10 mg by mouth once daily.       fexofenadine (ALLEGRA) 180 MG tablet Take 180 mg by mouth once daily.       ibuprofen (ADVIL,MOTRIN) 200 MG tablet Take 200 mg by mouth every 6 (six) hours as needed for Pain.       krill oil 500 mg Cap Take by mouth once daily.       LIALDA 1.2 gram TbEC 2.4 g daily with breakfast.  10/4/2017: Received from: External Pharmacy      lisinopriL 10 MG tablet TAKE 1 TABLET (10 MG TOTAL) BY MOUTH ONCE DAILY.  90 tablet 3    melatonin (MELATIN ORAL) Take by mouth.       metoprolol succinate (TOPROL-XL) 25 MG 24 hr tablet TK 1  T PO QHS PRN HEART   3    omeprazole (PRILOSEC) 20 MG capsule TAKE 1 CAPSULE (20 MG TOTAL) BY MOUTH ONCE DAILY.  90 capsule 0    PROAIR HFA 90 mcg/actuation inhaler INHALE 2 PUFFS BY MOUTH EVERY 4 HOURS AS NEEDED FOR COUGH OR SHORTNESS OF BREATH       pulse oximeter (PULSE OXIMETER) device by Apply Externally route 2 (two) times a day. Use twice daily at 8 AM and 3 PM and record the value in [employer] as directed.  1 each 0    rosuvastatin (CRESTOR) 5 MG tablet Take 5 mg by mouth once daily.       simethicone (GAS-X ORAL) Take by mouth once daily.         No facility-administered encounter medications on file as of 5/16/2022.        Allergies:  Review of patient's allergies indicates:   Allergen Reactions    Bactrim [sulfamethoxazole-trimethoprim] Itching         Physical Exam         Vitals:    05/16/22 1610   BP: (!) 158/88   Pulse: 110   Resp: 18   Temp: 98 °F (36.7 °C)         Physical Exam     Laboratory:  CBC:  No results for input(s): WBC, RBC, HGB, HCT, PLT, MCV, MCH, MCHC in the last 2160 hours.  CMP:  No results for input(s): GLU, CALCIUM, ALBUMIN, PROT, NA, K, CO2, CL, BUN, ALKPHOS, ALT, AST, BILITOT in the last 2160 hours.    Invalid input(s):  CREATININ  URINALYSIS:  No results for input(s): COLORU, CLARITYU, SPECGRAV, PHUR, PROTEINUA, GLUCOSEU, BILIRUBINCON, BLOODU, WBCU, RBCU, BACTERIA, MUCUS, NITRITE, LEUKOCYTESUR, UROBILINOGEN, HYALINECASTS in the last 2160 hours.   LIPIDS:  No results for input(s): TSH, HDL, CHOL, TRIG, LDLCALC, CHOLHDL, NONHDLCHOL, TOTALCHOLEST in the last 2160 hours.  TSH:  No results for input(s): TSH in the last 2160 hours.  A1C:  No results for input(s): HGBA1C in the last 2160 hours.    Radiology:        Assessment:     Jenny Caro is a 66 y.o.female with:    Ulcerative colitis with complication, unspecified location    Morbid obesity with body mass index (BMI) of 45.0 to 49.9 in adult    Gastroesophageal reflux disease without esophagitis    ASCVD (arteriosclerotic cardiovascular disease)          Plan:     Problem List Items Addressed This Visit        Cardiac/Vascular    ASCVD (arteriosclerotic cardiovascular disease)    Overview     Dr gustafson managing is now on rosuvastatin  , she is also on asa , and metoprolol and crestor for secondary prevention               Endocrine    Morbid obesity with body mass index (BMI) of 45.0 to 49.9 in adult    Overview     counciled again on diet and exercise              GI    Gastroesophageal reflux disease (Chronic)    Overview     Cont prilosec is working well            Ulcerative colitis - Primary (Chronic)    Overview     Cont lialda . Gi managing dr whitfield . She is asymptomatic                 As above, continue current medications and maintain follow up with specialists.  Return to clinic in 6  months.      Frederick W Dantagnan [facility] [place]

## 2022-05-27 ENCOUNTER — PATIENT MESSAGE (OUTPATIENT)
Dept: INTERNAL MEDICINE | Facility: CLINIC | Age: 67
End: 2022-05-27
Payer: MEDICARE

## 2022-05-27 ENCOUNTER — PATIENT OUTREACH (OUTPATIENT)
Dept: ADMINISTRATIVE | Facility: HOSPITAL | Age: 67
End: 2022-05-27
Payer: MEDICARE

## 2022-05-27 NOTE — TELEPHONE ENCOUNTER
Increase the lisinopril to 20 mg daily . Cont to keep log . Yes headaches could be from elevated pressure

## 2022-05-27 NOTE — PROGRESS NOTES
Health Maintenance Due   Topic Date Due    Hepatitis C Screening  Never done    DEXA Scan  Never done    COVID-19 Vaccine (4 - Booster for Moderna series) 04/09/2022    Mammogram  07/19/2022     Triggered LINKS & reconciled immunizations. Updated Care Everywhere. Checked for outside lab results in [employer]. Imported outside lab results for A1c and lipid panel into . Chart review completed.

## 2022-06-27 ENCOUNTER — PATIENT MESSAGE (OUTPATIENT)
Dept: INTERNAL MEDICINE | Facility: CLINIC | Age: 67
End: 2022-06-27
Payer: MEDICARE

## 2022-06-27 DIAGNOSIS — K21.9 GASTROESOPHAGEAL REFLUX DISEASE WITHOUT ESOPHAGITIS: Chronic | ICD-10-CM

## 2022-06-27 RX ORDER — LISINOPRIL 20 MG/1
20 TABLET ORAL DAILY
Qty: 90 TABLET | Refills: 3 | Status: SHIPPED | OUTPATIENT
Start: 2022-06-27 | End: 2022-12-05 | Stop reason: SDUPTHER

## 2022-06-27 RX ORDER — OMEPRAZOLE 20 MG/1
20 CAPSULE, DELAYED RELEASE ORAL DAILY
Qty: 90 CAPSULE | Refills: 0 | Status: SHIPPED | OUTPATIENT
Start: 2022-06-27 | End: 2022-10-10

## 2022-06-27 NOTE — TELEPHONE ENCOUNTER
No new care gaps identified.  [facility] Embedded Care Gaps. Reference number: [id number]. 6/27/2022   2:09:39 PM CDT

## 2022-06-30 ENCOUNTER — PATIENT OUTREACH (OUTPATIENT)
Dept: ADMINISTRATIVE | Facility: HOSPITAL | Age: 67
End: 2022-06-30
Payer: MEDICARE

## 2022-06-30 NOTE — PROGRESS NOTES
Health Maintenance Due   Topic Date Due    Hepatitis C Screening  Never done    DEXA Scan  Never done    COVID-19 Vaccine (4 - Booster for Moderna series) 04/09/2022    Mammogram  07/19/2022     Triggered LINKS. Updated Care Everywhere. Imported outside colonoscopy results into . Edited  frequency to colonoscopy every 3 yrs per report recommendation. Chart review completed.

## 2022-07-29 NOTE — PROGRESS NOTES
Just wanted to let you know that I got your mammogram results back and the radiologist reading is perfectly normal. Let me know if you have any questions or concerns  Hope you have a great day!  Dr Russell

## 2022-10-24 ENCOUNTER — TELEPHONE (OUTPATIENT)
Dept: INTERNAL MEDICINE | Facility: CLINIC | Age: 67
End: 2022-10-24
Payer: MEDICARE

## 2022-10-24 DIAGNOSIS — N30.00 ACUTE CYSTITIS WITHOUT HEMATURIA: Primary | ICD-10-CM

## 2022-10-24 RX ORDER — CIPROFLOXACIN 500 MG/1
500 TABLET ORAL 2 TIMES DAILY
Qty: 6 TABLET | Refills: 0 | Status: SHIPPED | OUTPATIENT
Start: 2022-10-24 | End: 2022-12-02

## 2022-10-24 NOTE — TELEPHONE ENCOUNTER
----- Message from Airam Davidson sent at 10/24/2022  3:11 PM CDT -----  Contact: [phone]  [email or web address]     Patient is calling for Medical Advice regarding:Positive for bladder infection (home test),burning with urination    How long has patient had these symptoms: x1day  Pharmacy name and phone#:   [id number] [address] [address]  Phone: [phone] Fax: [phone]        Would like response via [employer]: call    Comments:

## 2022-10-24 NOTE — TELEPHONE ENCOUNTER
Pt woke up with burning urination, took at home UTI test. Was positive, asking for antibiotics to be called into [facility] on [address]

## 2022-11-15 ENCOUNTER — TELEPHONE (OUTPATIENT)
Dept: INTERNAL MEDICINE | Facility: CLINIC | Age: 67
End: 2022-11-15
Payer: MEDICARE

## 2022-11-15 DIAGNOSIS — U07.1 COVID: Primary | ICD-10-CM

## 2022-11-15 NOTE — TELEPHONE ENCOUNTER
----- Message from Lorena Jack sent at 11/15/2022  1:47 PM CST -----  Regarding: advice  Contact: patient [phone]  [email or web address]     Patient is calling for Medical Advice regarding: COVID positive - cough - sore throat    How long has patient had these symptoms:  3rd day      Pharmacy name and phone#:   [id number] - [place] [id number] [address] [address]  Phone: [phone] Fax: [phone]        Would like response via [employer]:  please call    Comments:

## 2022-11-15 NOTE — TELEPHONE ENCOUNTER
Pt went to urgent care this morning and tested positive for covid. C/o cough and sore throat, no fever, no congestion. Urgent care prescribed a cough med and steroid, was told to reach out to PCP for another med      2 -risk score

## 2022-11-16 ENCOUNTER — TELEPHONE (OUTPATIENT)
Dept: INFECTIOUS DISEASES | Facility: HOSPITAL | Age: 67
End: 2022-11-16
Payer: MEDICARE

## 2022-11-16 ENCOUNTER — PATIENT MESSAGE (OUTPATIENT)
Dept: INFECTIOUS DISEASES | Facility: HOSPITAL | Age: 67
End: 2022-11-16
Payer: MEDICARE

## 2022-11-17 ENCOUNTER — INFUSION (OUTPATIENT)
Dept: INFECTIOUS DISEASES | Facility: HOSPITAL | Age: 67
End: 2022-11-17
Attending: INTERNAL MEDICINE
Payer: MEDICARE

## 2022-11-17 VITALS
BODY MASS INDEX: 50.03 KG/M2 | HEART RATE: 76 BPM | SYSTOLIC BLOOD PRESSURE: 151 MMHG | WEIGHT: 265 LBS | OXYGEN SATURATION: 100 % | TEMPERATURE: 99 F | RESPIRATION RATE: 14 BRPM | HEIGHT: 61 IN | DIASTOLIC BLOOD PRESSURE: 75 MMHG

## 2022-11-17 DIAGNOSIS — U07.1 COVID: Primary | ICD-10-CM

## 2022-11-17 PROCEDURE — 63600175 PHARM REV CODE 636 W HCPCS: Performed by: INTERNAL MEDICINE

## 2022-11-17 PROCEDURE — M0222 HC IV INJECTION, BEBTELOVIMAB, INCL POST ADMIN MONIT: HCPCS | Performed by: INTERNAL MEDICINE

## 2022-11-17 RX ORDER — DIPHENHYDRAMINE HYDROCHLORIDE 50 MG/ML
25 INJECTION INTRAMUSCULAR; INTRAVENOUS
Status: ACTIVE | OUTPATIENT
Start: 2022-11-17 | End: 2022-11-18

## 2022-11-17 RX ORDER — ONDANSETRON 4 MG/1
4 TABLET, ORALLY DISINTEGRATING ORAL
Status: ACTIVE | OUTPATIENT
Start: 2022-11-17 | End: 2022-11-18

## 2022-11-17 RX ORDER — ACETAMINOPHEN 325 MG/1
650 TABLET ORAL
Status: ACTIVE | OUTPATIENT
Start: 2022-11-17 | End: 2022-11-18

## 2022-11-17 RX ORDER — ALBUTEROL SULFATE 90 UG/1
2 AEROSOL, METERED RESPIRATORY (INHALATION)
Status: ACTIVE | OUTPATIENT
Start: 2022-11-17 | End: 2022-11-20

## 2022-11-17 RX ORDER — BEBTELOVIMAB 87.5 MG/ML
175 INJECTION, SOLUTION INTRAVENOUS
Status: COMPLETED | OUTPATIENT
Start: 2022-11-17 | End: 2022-11-17

## 2022-11-17 RX ORDER — EPINEPHRINE 0.3 MG/.3ML
0.3 INJECTION SUBCUTANEOUS
Status: ACTIVE | OUTPATIENT
Start: 2022-11-17 | End: 2022-11-20

## 2022-11-17 RX ADMIN — BEBTELOVIMAB 175 MG: 87.5 INJECTION, SOLUTION INTRAVENOUS at 10:11

## 2022-11-17 NOTE — PROGRESS NOTES
1000  Patient arrives for Bebtelovimab IV Injection. Ambulatory. Pt AAox3. No distress noted. RR even and unlabored.     Symptoms and onset date:  11/13/22    Tested COVID + on 11/15/22

## 2022-11-17 NOTE — PROGRESS NOTES
1105  Patient remains with no signs of complications noted. Patient received Bebtelovimab IV Injection according to FDA recommendations and [facility] SOP without complications noted and left with mask in place. Drug fact sheet provided. Pt discharged home. Ambulatory. Remains AAox3. No distress noted. RR even and unlabored.

## 2022-11-17 NOTE — PROGRESS NOTES
1005  Bebtelovimab IV Injection administered. Pt tolerated injection well. No S/S of injection reaction noted at present time. One hour observation started.

## 2022-12-01 ENCOUNTER — TELEPHONE (OUTPATIENT)
Dept: INTERNAL MEDICINE | Facility: CLINIC | Age: 67
End: 2022-12-01
Payer: MEDICARE

## 2022-12-01 NOTE — TELEPHONE ENCOUNTER
----- Message from Fabiola Dockery sent at 12/1/2022  9:07 AM CST -----  Contact: self [phone]  Pt requesting a call stated she will like to switch appt dates with  who come tomorrow and want him to come today.    Please call and advise

## 2022-12-02 ENCOUNTER — OFFICE VISIT (OUTPATIENT)
Dept: INTERNAL MEDICINE | Facility: CLINIC | Age: 67
End: 2022-12-02
Payer: MEDICARE

## 2022-12-02 VITALS
HEART RATE: 90 BPM | WEIGHT: 270.06 LBS | RESPIRATION RATE: 18 BRPM | SYSTOLIC BLOOD PRESSURE: 134 MMHG | OXYGEN SATURATION: 98 % | HEIGHT: 61 IN | TEMPERATURE: 98 F | DIASTOLIC BLOOD PRESSURE: 84 MMHG | BODY MASS INDEX: 50.99 KG/M2

## 2022-12-02 DIAGNOSIS — K51.919 ULCERATIVE COLITIS WITH COMPLICATION, UNSPECIFIED LOCATION: Chronic | ICD-10-CM

## 2022-12-02 DIAGNOSIS — E78.2 MIXED HYPERLIPIDEMIA: ICD-10-CM

## 2022-12-02 DIAGNOSIS — E66.01 MORBID OBESITY WITH BODY MASS INDEX (BMI) OF 45.0 TO 49.9 IN ADULT: ICD-10-CM

## 2022-12-02 DIAGNOSIS — I11.9 HYPERTENSIVE HEART DISEASE WITHOUT HEART FAILURE: ICD-10-CM

## 2022-12-02 DIAGNOSIS — I25.10 ASCVD (ARTERIOSCLEROTIC CARDIOVASCULAR DISEASE): Primary | ICD-10-CM

## 2022-12-02 PROCEDURE — 99214 OFFICE O/P EST MOD 30 MIN: CPT | Mod: PBBFAC | Performed by: INTERNAL MEDICINE

## 2022-12-02 PROCEDURE — 99999 PR PBB SHADOW E&M-EST. PATIENT-LVL IV: CPT | Mod: PBBFAC,,, | Performed by: INTERNAL MEDICINE

## 2022-12-02 PROCEDURE — 99214 OFFICE O/P EST MOD 30 MIN: CPT | Mod: S$PBB,,, | Performed by: INTERNAL MEDICINE

## 2022-12-02 PROCEDURE — 99214 PR OFFICE/OUTPT VISIT, EST, LEVL IV, 30-39 MIN: ICD-10-PCS | Mod: S$PBB,,, | Performed by: INTERNAL MEDICINE

## 2022-12-02 PROCEDURE — 99999 PR PBB SHADOW E&M-EST. PATIENT-LVL IV: ICD-10-PCS | Mod: PBBFAC,,, | Performed by: INTERNAL MEDICINE

## 2022-12-02 NOTE — PROGRESS NOTES
[facility] Note    Chief Complaint      Chief Complaint   Patient presents with    Follow-up     6M        History of Present Illness      Jenny Caro is a 67 y.o. female who presents today for   Chief Complaint   Patient presents with    Follow-up     6M    .  Patient comes to appointment here for 6m checkup for chronic issues as below .she has received covid vaccine x 4 . Has received flu vaccine as well .she is due for labs with dr gustafson in 1/23 . She has covid in the past and feels less focused since the infection .     Problem List Items Addressed This Visit          Cardiac/Vascular    Hyperlipidemia    Overview     Stable on current crestor 10 mg          ASCVD (arteriosclerotic cardiovascular disease) - Primary    Overview     Dr gustafson managing is now on rosuvastatin  , she is also on asa , and metoprolol and crestor for secondary prevention          Hypertensive heart disease without heart failure    Overview     Bp looks great cont current            Endocrine    Morbid obesity with body mass index (BMI) of 45.0 to 49.9 in adult    Overview     counciled again on diet and exercise            GI    Ulcerative colitis (Chronic)    Overview     Cont lialda . Gi managing dr whitfield . She is asymptomatic              Past Medical History:  Past Medical History:   Diagnosis Date    Abnormal Pap smear of cervix 2009    LEEP 2009, mild dysplasia normal paps since    Arthritis     ankles  born with club feet    GERD (gastroesophageal reflux disease)     HPV in female     Hyperlipidemia     Hypertension     Ulcerative colitis        Past Surgical History:  Past Surgical History:   Procedure Laterality Date    CERVICAL BIOPSY  W/ LOOP ELECTRODE EXCISION  2009    COLONOSCOPY  2013    approx    CYSTOSCOPY N/A 3/13/2019    Procedure: CYSTOSCOPY;  Surgeon: Rui Lozano DO;  Location: [facility];  Service: OB/GYN;  Laterality: N/A;    DILATION AND CURETTAGE OF UTERUS  11/20/2018     Procedure: DILATION AND CURETTAGE, UTERUS;  Surgeon: Arvind Russell MD;  Location: [facility];  Service: OB/GYN;;    HYSTERECTOMY  03/13/2019    Robotic DV/ BSO     HYSTEROSCOPY N/A 11/20/2018    Procedure: HYSTEROSCOPY; TRUCLEAR RESECTION OF UTERINE POLYP;  Surgeon: Arvind Russell MD;  Location: [facility];  Service: OB/GYN;  Laterality: N/A;    LAPAROSCOPIC GASTRIC BANDING  2006    POSTERIOR REPAIR N/A 3/13/2019    Procedure: COLPORRHAPHY, POSTERIOR;  Surgeon: Rui Lozano DO;  Location: [facility] OR;  Service: OB/GYN;  Laterality: N/A;    ROBOT-ASSISTED LAPAROSCOPIC ABDOMINAL SACROCOLPOPEXY N/A 3/13/2019    Procedure: ROBOTIC SACROCOLPOPEXY, ABDOMEN;  Surgeon: Rui Lozano DO;  Location: [facility];  Service: OB/GYN;  Laterality: N/A;    ROBOT-ASSISTED LAPAROSCOPIC HYSTERECTOMY N/A 3/13/2019    Procedure: ROBOTIC HYSTERECTOMY;  Surgeon: Arvind Russell MD;  Location: [facility];  Service: OB/GYN;  Laterality: N/A;    ROBOT-ASSISTED LAPAROSCOPIC SALPINGO-OOPHORECTOMY Bilateral 3/13/2019    Procedure: ROBOTIC SALPINGO-OOPHORECTOMY;  Surgeon: Arvind Russell MD;  Location: [facility];  Service: OB/GYN;  Laterality: Bilateral;    SLEEVE GASTROPLASTY  08/2015       Family History:  family history includes Diabetes in her mother and sister; Heart disease in her father; Stroke in her sister.    Social History:  Social History     Socioeconomic History    Marital status:    Occupational History    Occupation: retired   Tobacco Use    Smoking status: Never    Smokeless tobacco: Never   Substance and Sexual Activity    Alcohol use: No    Drug use: No    Sexual activity: Yes     Partners: Male     Birth control/protection: Post-menopausal, Surgical     Comment: :     DVH/BSO   3-13-19       Review of Systems:   Review of Systems   Constitutional:  Negative for fever and weight loss.   HENT:  Negative for congestion, hearing loss and sore throat.    Eyes:  Negative for blurred vision.   Respiratory:  Negative for  cough and shortness of breath.    Cardiovascular:  Negative for chest pain, palpitations, claudication and leg swelling.   Gastrointestinal:  Negative for abdominal pain, constipation, diarrhea and heartburn.   Genitourinary:  Negative for dysuria.   Musculoskeletal:  Negative for back pain and myalgias.   Skin:  Negative for rash.   Neurological:  Negative for focal weakness and headaches.   Psychiatric/Behavioral:  Negative for depression and suicidal ideas. The patient is not nervous/anxious.        Medications:  Outpatient Encounter Medications as of 12/2/2022   Medication Sig Note Dispense Refill    antiox #8/om3/dha/epa/lut/zeax (PRESERVISION AREDS 2, OMEGA-3, ORAL) Take 1 tablet by mouth once daily.       ascorbic acid, vitamin C, (VITAMIN C) 100 MG tablet Take 100 mg by mouth once daily.       aspirin (ECOTRIN) 81 MG EC tablet Take 81 mg by mouth once daily.       biotin 1 mg Cap Take by mouth.       calcium citrate-vitamin D3 200 mg calcium -250 unit Tab Take by mouth.       cetirizine (ZYRTEC) 10 MG tablet Take 10 mg by mouth once daily.       fexofenadine (ALLEGRA) 180 MG tablet Take 180 mg by mouth once daily.       ibuprofen (ADVIL,MOTRIN) 200 MG tablet Take 200 mg by mouth every 6 (six) hours as needed for Pain.       krill oil 500 mg Cap Take by mouth once daily.       LIALDA 1.2 gram TbEC 2.4 g daily with breakfast.  10/4/2017: Received from: External Pharmacy      lisinopriL (PRINIVIL,ZESTRIL) 20 MG tablet Take 1 tablet (20 mg total) by mouth once daily.  90 tablet 3    melatonin (MELATIN ORAL) Take by mouth.       metoprolol succinate (TOPROL-XL) 25 MG 24 hr tablet TK 1  T PO QHS PRN HEART   3    omeprazole (PRILOSEC) 20 MG capsule TAKE ONE CAPSULE BY MOUTH ONCE DAILY  90 capsule 2    PROAIR HFA 90 mcg/actuation inhaler INHALE 2 PUFFS BY MOUTH EVERY 4 HOURS AS NEEDED FOR COUGH OR SHORTNESS OF BREATH       rosuvastatin (CRESTOR) 5 MG tablet Take 5 mg by mouth once daily.       simethicone (GAS-X  ORAL) Take by mouth once daily.        pulse oximeter (PULSE OXIMETER) device by Apply Externally route 2 (two) times a day. Use twice daily at 8 AM and 3 PM and [facility] directed. (Patient not taking: Reported on 12/2/2022)  1 each 0    [DISCONTINUED] ciprofloxacin HCl (CIPRO) 500 MG tablet Take 1 tablet (500 mg total) by mouth 2 (two) times daily. (Patient not taking: Reported on 12/2/2022)  6 tablet 0    [DISCONTINUED] HYDROcodone-acetaminophen (NORCO) 5-325 mg per tablet Take 1 tablet by mouth every 6 (six) hours as needed for Pain. (Patient not taking: Reported on 12/2/2022)  20 tablet 0     No facility-administered encounter medications on file as of 12/2/2022.        Allergies:  Review of patient's allergies indicates:   Allergen Reactions    Bactrim [sulfamethoxazole-trimethoprim] Itching         Physical Exam         Vitals:    12/02/22 1347   BP: 134/84   Pulse: 90   Resp: 18   Temp: 98 °F (36.7 °C)         Physical Exam  Constitutional:       Appearance: She is well-developed.   Eyes:      Pupils: Pupils are equal, round, and reactive to light.   Neck:      Thyroid: No thyromegaly.   Cardiovascular:      Rate and Rhythm: Normal rate.      Heart sounds: Normal heart sounds. No murmur heard.    No friction rub. No gallop.   Pulmonary:      Breath sounds: Normal breath sounds.   Abdominal:      General: Bowel sounds are normal.      Palpations: Abdomen is soft.   Musculoskeletal:         General: Normal range of motion.      Cervical back: Normal range of motion.   Lymphadenopathy:      Cervical: No cervical adenopathy.   Skin:     General: Skin is warm.      Findings: No rash.   Neurological:      Mental Status: She is alert and oriented to person, place, and time.      Cranial Nerves: No cranial nerve deficit.   Psychiatric:         Behavior: Behavior normal.        Laboratory:  CBC:  No results for input(s): WBC, RBC, HGB, HCT, PLT, MCV, MCH, MCHC in the last 2160 hours.  CMP:  No  results for input(s): GLU, CALCIUM, ALBUMIN, PROT, NA, K, CO2, CL, BUN, ALKPHOS, ALT, AST, BILITOT in the last 2160 hours.    Invalid input(s): CREATININ  URINALYSIS:  No results for input(s): COLORU, CLARITYU, SPECGRAV, PHUR, PROTEINUA, GLUCOSEU, BILIRUBINCON, BLOODU, WBCU, RBCU, BACTERIA, MUCUS, NITRITE, LEUKOCYTESUR, UROBILINOGEN, HYALINECASTS in the last 2160 hours.   LIPIDS:  No results for input(s): TSH, HDL, CHOL, TRIG, LDLCALC, CHOLHDL, NONHDLCHOL, TOTALCHOLEST in the last 2160 hours.  TSH:  No results for input(s): TSH in the last 2160 hours.  A1C:  No results for input(s): HGBA1C in the last 2160 hours.    Radiology:        Assessment:     Jenny Caro is a 67 y.o.female with:    ASCVD (arteriosclerotic cardiovascular disease)    Ulcerative colitis with complication, unspecified location    Mixed hyperlipidemia    Hypertensive heart disease without heart failure    Morbid obesity with body mass index (BMI) of 45.0 to 49.9 in adult        Plan:     Problem List Items Addressed This Visit          Cardiac/Vascular    Hyperlipidemia    Overview     Stable on current crestor 10 mg          ASCVD (arteriosclerotic cardiovascular disease) - Primary    Overview     Dr gustafson managing is now on rosuvastatin  , she is also on asa , and metoprolol and crestor for secondary prevention          Hypertensive heart disease without heart failure    Overview     Bp looks great cont current            Endocrine    Morbid obesity with body mass index (BMI) of 45.0 to 49.9 in adult    Overview     counciled again on diet and exercise            GI    Ulcerative colitis (Chronic)    Overview     Cont lialda . Gi managing dr whitfield . She is asymptomatic            As above, continue current medications and maintain follow up with specialists.  Return to clinic in 6 months.      Frederick W Dantagnan [facility] [place]

## 2022-12-05 RX ORDER — LISINOPRIL 20 MG/1
20 TABLET ORAL DAILY
Qty: 90 TABLET | Refills: 3 | Status: SHIPPED | OUTPATIENT
Start: 2022-12-05 | End: 2023-01-05 | Stop reason: SDUPTHER

## 2022-12-05 NOTE — TELEPHONE ENCOUNTER
No new care gaps identified.  [facility] Embedded Care Gaps. Reference number: [id number]. 12/05/2022   3:47:54 PM CST

## 2022-12-05 NOTE — TELEPHONE ENCOUNTER
----- Message from Destini Love sent at 12/5/2022  3:40 PM CST -----  Requesting an RX refill or new RX.  Is this a refill or new RX: Refill  RX name and strength lisinopriL (PRINIVIL,ZESTRIL) 20 MG tablet  Is this a 30 day or 90 day RX:   Pharmacy name and phone # [id number] [address]   Phone:  [phone] Fax:  [phone]

## 2023-01-05 ENCOUNTER — OFFICE VISIT (OUTPATIENT)
Dept: CARDIOLOGY | Facility: CLINIC | Age: 68
End: 2023-01-05
Payer: MEDICARE

## 2023-01-05 VITALS
WEIGHT: 271.19 LBS | RESPIRATION RATE: 20 BRPM | BODY MASS INDEX: 51.2 KG/M2 | HEIGHT: 61 IN | DIASTOLIC BLOOD PRESSURE: 81 MMHG | HEART RATE: 86 BPM | SYSTOLIC BLOOD PRESSURE: 123 MMHG

## 2023-01-05 DIAGNOSIS — I25.10 ASCVD (ARTERIOSCLEROTIC CARDIOVASCULAR DISEASE): ICD-10-CM

## 2023-01-05 DIAGNOSIS — E78.5 HYPERLIPIDEMIA, UNSPECIFIED HYPERLIPIDEMIA TYPE: Primary | ICD-10-CM

## 2023-01-05 DIAGNOSIS — I11.9 HYPERTENSIVE HEART DISEASE WITHOUT HEART FAILURE: ICD-10-CM

## 2023-01-05 DIAGNOSIS — E66.01 MORBID OBESITY WITH BODY MASS INDEX (BMI) OF 45.0 TO 49.9 IN ADULT: ICD-10-CM

## 2023-01-05 DIAGNOSIS — I10 ESSENTIAL (PRIMARY) HYPERTENSION: ICD-10-CM

## 2023-01-05 PROCEDURE — 99204 OFFICE O/P NEW MOD 45 MIN: CPT | Mod: S$PBB,,, | Performed by: INTERNAL MEDICINE

## 2023-01-05 PROCEDURE — 93005 ELECTROCARDIOGRAM TRACING: CPT | Mod: PBBFAC,PO | Performed by: INTERNAL MEDICINE

## 2023-01-05 PROCEDURE — 99204 PR OFFICE/OUTPT VISIT, NEW, LEVL IV, 45-59 MIN: ICD-10-PCS | Mod: S$PBB,,, | Performed by: INTERNAL MEDICINE

## 2023-01-05 PROCEDURE — 99999 PR PBB SHADOW E&M-EST. PATIENT-LVL IV: ICD-10-PCS | Mod: PBBFAC,,, | Performed by: INTERNAL MEDICINE

## 2023-01-05 PROCEDURE — 93010 ELECTROCARDIOGRAM REPORT: CPT | Mod: S$PBB,,, | Performed by: INTERNAL MEDICINE

## 2023-01-05 PROCEDURE — 93010 EKG 12-LEAD: ICD-10-PCS | Mod: S$PBB,,, | Performed by: INTERNAL MEDICINE

## 2023-01-05 PROCEDURE — 99214 OFFICE O/P EST MOD 30 MIN: CPT | Mod: PBBFAC,PO | Performed by: INTERNAL MEDICINE

## 2023-01-05 PROCEDURE — 99999 PR PBB SHADOW E&M-EST. PATIENT-LVL IV: CPT | Mod: PBBFAC,,, | Performed by: INTERNAL MEDICINE

## 2023-01-05 RX ORDER — LISINOPRIL 20 MG/1
20 TABLET ORAL DAILY
Qty: 90 TABLET | Refills: 3 | Status: SHIPPED | OUTPATIENT
Start: 2023-01-05 | End: 2023-06-13 | Stop reason: SDUPTHER

## 2023-01-05 RX ORDER — ROSUVASTATIN CALCIUM 20 MG/1
20 TABLET, COATED ORAL DAILY
Qty: 90 TABLET | Refills: 3 | Status: SHIPPED | OUTPATIENT
Start: 2023-01-05 | End: 2023-08-22 | Stop reason: SDUPTHER

## 2023-01-05 RX ORDER — METOPROLOL SUCCINATE 25 MG/1
TABLET, EXTENDED RELEASE ORAL
Qty: 90 TABLET | Refills: 3 | Status: SHIPPED | OUTPATIENT
Start: 2023-01-05 | End: 2023-12-26

## 2023-01-05 NOTE — PROGRESS NOTES
HISTORY:    66 yo F w a h/o SVT, nonobs CAD, hypertension, hyperlipidemia, obesity, UC, and OA presenting for initial evaluation by me.    The patient denies any symptoms of chest pain, shortness of breath, or dyspnea on exertion. Activity levels are mild by choice. Completes ADLs. Limited by fatigue and ankle pain 2/2 OA.    The patient denies any previous history of myocardial infarction, coronary artery disease, peripheral arterial disease, stroke, congestive heart failure, or cardiomyopathy.    H/o SVT that has not been an issue recently. Currently tolerates metoprolol 25x1, lisinopril 20x1, and rosuvastatin 20x1.  Does not check Bps at home.       PHYSICAL EXAM:    Vitals:    01/05/23 1303   BP: 123/81   Pulse: 86   Resp: 20       NAD, A+Ox3.  No jvd, no bruit.  RRR nml s1,s2. No murmurs.  CTA B no wheezes or crackles.  2+ B radial and 1+ B DP/PT. No edema.    LABS/STUDIES (imaging reviewed during clinic visit):    January 2021 hemoglobin 13.1.  Creatinine 0.6 with a BUN 24.  Albumin 2.8.  ECG today NSR no Qs/Sts. NSIVCD.    OSH records at   Coronary CTA 2021 Calcium score 260 Nonobs CAD.      ASSESSMENT & PLAN:    1. Hyperlipidemia, unspecified hyperlipidemia type    2. ASCVD (arteriosclerotic cardiovascular disease)    3. Hypertensive heart disease without heart failure    4. Morbid obesity with body mass index (BMI) of 45.0 to 49.9 in adult    5. Essential (primary) hypertension        Orders Placed This Encounter    CBC Without Differential    Comprehensive Metabolic Panel    Lipid Panel    TSH    IN OFFICE EKG 12-LEAD (to Muse)    Echo    rosuvastatin (CRESTOR) 20 MG tablet    metoprolol succinate (TOPROL-XL) 25 MG 24 hr tablet    lisinopriL (PRINIVIL,ZESTRIL) 20 MG tablet      Nonobs CAD w an elevated calcium score. Tolerating rosuvastatin 20x1. No lipid panel available to me.    Bps controlled on metoprolol/lisinopril. H/o SVT without palpitations at this time.    Pt understands the importance of  weight loss and activity. She is trying to optimize these risk factors.     Janina Wheeler MD

## 2023-01-13 ENCOUNTER — TELEPHONE (OUTPATIENT)
Dept: INTERNAL MEDICINE | Facility: CLINIC | Age: 68
End: 2023-01-13
Payer: MEDICARE

## 2023-01-13 NOTE — TELEPHONE ENCOUNTER
----- Message from Erik Le sent at 1/13/2023 10:21 AM CST -----  .Type:  Same Day Appointment Request    Caller is requesting a same day appointment.  Caller declined first available appointment listed below.    Name of Caller:Pt  When is the first available appointment? N/A  Symptoms:Cold Like Symptoms,Congestion, shortness of breath  Best Call Back Number:[phone]

## 2023-04-22 LAB
ALBUMIN SERPL-MCNC: 4.1 G/DL (ref 3.6–5.1)
ALBUMIN/GLOB SERPL: 1.5 (CALC) (ref 1–2.5)
ALP SERPL-CCNC: 61 U/L (ref 37–153)
ALT SERPL-CCNC: 17 U/L (ref 6–29)
AST SERPL-CCNC: 17 U/L (ref 10–35)
BASOPHILS # BLD AUTO: 19 CELLS/UL (ref 0–200)
BASOPHILS NFR BLD AUTO: 0.4 %
BILIRUB SERPL-MCNC: 0.4 MG/DL (ref 0.2–1.2)
BUN SERPL-MCNC: 20 MG/DL (ref 7–25)
BUN/CREAT SERPL: NORMAL (CALC) (ref 6–22)
CALCIUM SERPL-MCNC: 9.4 MG/DL (ref 8.6–10.4)
CHLORIDE SERPL-SCNC: 104 MMOL/L (ref 98–110)
CHOLEST SERPL-MCNC: 154 MG/DL
CHOLEST/HDLC SERPL: 2.7 (CALC)
CO2 SERPL-SCNC: 30 MMOL/L (ref 20–32)
CREAT SERPL-MCNC: 0.69 MG/DL (ref 0.5–1.05)
EGFR: 94 ML/MIN/1.73M2
EOSINOPHIL # BLD AUTO: 173 CELLS/UL (ref 15–500)
EOSINOPHIL NFR BLD AUTO: 3.6 %
ERYTHROCYTE [DISTWIDTH] IN BLOOD BY AUTOMATED COUNT: 13.8 % (ref 11–15)
GLOBULIN SER CALC-MCNC: 2.7 G/DL (CALC) (ref 1.9–3.7)
GLUCOSE SERPL-MCNC: 91 MG/DL (ref 65–99)
HCT VFR BLD AUTO: 40.5 % (ref 35–45)
HDLC SERPL-MCNC: 58 MG/DL
HGB BLD-MCNC: 13.6 G/DL (ref 11.7–15.5)
LDLC SERPL CALC-MCNC: 76 MG/DL (CALC)
LYMPHOCYTES # BLD AUTO: 1430 CELLS/UL (ref 850–3900)
LYMPHOCYTES NFR BLD AUTO: 29.8 %
MCH RBC QN AUTO: 31.7 PG (ref 27–33)
MCHC RBC AUTO-ENTMCNC: 33.6 G/DL (ref 32–36)
MCV RBC AUTO: 94.4 FL (ref 80–100)
MONOCYTES # BLD AUTO: 360 CELLS/UL (ref 200–950)
MONOCYTES NFR BLD AUTO: 7.5 %
NEUTROPHILS # BLD AUTO: 2818 CELLS/UL (ref 1500–7800)
NEUTROPHILS NFR BLD AUTO: 58.7 %
NONHDLC SERPL-MCNC: 96 MG/DL (CALC)
PLATELET # BLD AUTO: 219 THOUSAND/UL (ref 140–400)
PMV BLD REES-ECKER: 10.8 FL (ref 7.5–12.5)
POTASSIUM SERPL-SCNC: 4.6 MMOL/L (ref 3.5–5.3)
PROT SERPL-MCNC: 6.8 G/DL (ref 6.1–8.1)
RBC # BLD AUTO: 4.29 MILLION/UL (ref 3.8–5.1)
SODIUM SERPL-SCNC: 141 MMOL/L (ref 135–146)
TRIGL SERPL-MCNC: 118 MG/DL
TSH SERPL-ACNC: 1.84 MIU/L (ref 0.4–4.5)
WBC # BLD AUTO: 4.8 THOUSAND/UL (ref 3.8–10.8)

## 2023-06-13 ENCOUNTER — OFFICE VISIT (OUTPATIENT)
Dept: PRIMARY CARE CLINIC | Facility: CLINIC | Age: 68
End: 2023-06-13
Payer: MEDICARE

## 2023-06-13 VITALS
BODY MASS INDEX: 49.87 KG/M2 | TEMPERATURE: 98 F | WEIGHT: 264.13 LBS | OXYGEN SATURATION: 97 % | HEART RATE: 67 BPM | DIASTOLIC BLOOD PRESSURE: 80 MMHG | HEIGHT: 61 IN | RESPIRATION RATE: 18 BRPM | SYSTOLIC BLOOD PRESSURE: 120 MMHG

## 2023-06-13 DIAGNOSIS — E78.2 MIXED HYPERLIPIDEMIA: ICD-10-CM

## 2023-06-13 DIAGNOSIS — I25.10 ASCVD (ARTERIOSCLEROTIC CARDIOVASCULAR DISEASE): ICD-10-CM

## 2023-06-13 DIAGNOSIS — K51.919 ULCERATIVE COLITIS WITH COMPLICATION, UNSPECIFIED LOCATION: Chronic | ICD-10-CM

## 2023-06-13 DIAGNOSIS — K21.9 GASTROESOPHAGEAL REFLUX DISEASE WITHOUT ESOPHAGITIS: Primary | Chronic | ICD-10-CM

## 2023-06-13 DIAGNOSIS — I11.9 HYPERTENSIVE HEART DISEASE WITHOUT HEART FAILURE: ICD-10-CM

## 2023-06-13 DIAGNOSIS — E66.01 MORBID OBESITY WITH BODY MASS INDEX (BMI) OF 45.0 TO 49.9 IN ADULT: ICD-10-CM

## 2023-06-13 PROCEDURE — 99214 OFFICE O/P EST MOD 30 MIN: CPT | Mod: S$PBB,,, | Performed by: INTERNAL MEDICINE

## 2023-06-13 PROCEDURE — 99214 PR OFFICE/OUTPT VISIT, EST, LEVL IV, 30-39 MIN: ICD-10-PCS | Mod: S$PBB,,, | Performed by: INTERNAL MEDICINE

## 2023-06-13 PROCEDURE — 99214 OFFICE O/P EST MOD 30 MIN: CPT | Mod: PBBFAC | Performed by: INTERNAL MEDICINE

## 2023-06-13 PROCEDURE — 99999 PR PBB SHADOW E&M-EST. PATIENT-LVL IV: ICD-10-PCS | Mod: PBBFAC,,, | Performed by: INTERNAL MEDICINE

## 2023-06-13 PROCEDURE — 99999 PR PBB SHADOW E&M-EST. PATIENT-LVL IV: CPT | Mod: PBBFAC,,, | Performed by: INTERNAL MEDICINE

## 2023-06-13 RX ORDER — LISINOPRIL 20 MG/1
20 TABLET ORAL DAILY
Qty: 90 TABLET | Refills: 3 | Status: SHIPPED | OUTPATIENT
Start: 2023-06-13 | End: 2024-01-09 | Stop reason: SDUPTHER

## 2023-06-13 NOTE — PROGRESS NOTES
[facility] Note    Chief Complaint      Chief Complaint   Patient presents with    Follow-up     6M       History of Present Illness      Jenny Caro is a 67 y.o. female who presents today for   Chief Complaint   Patient presents with    Follow-up     6M   .  Patient comes to appointment here for 6m checkup for chronic issues as below . She is currently compliant with all meds . She is due for mammogram in July . She admits it has been hard to lose weight has been trying . Needs to get more active with exercise .     HPI    No problem-specific Assessment & Plan notes found for this encounter.       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 Gi managing dr whitfield . She is asymptomatic             Past Medical History:  Past Medical History:   Diagnosis Date    Abnormal Pap smear of cervix 2009    LEEP 2009, mild dysplasia normal paps since    Arthritis     ankles  born with club feet    GERD (gastroesophageal reflux disease)     HPV in female     Hyperlipidemia     Hypertension     Ulcerative colitis        Past Surgical History:  Past Surgical History:   Procedure Laterality Date    CERVICAL BIOPSY  W/ LOOP ELECTRODE EXCISION  2009    COLONOSCOPY  2013    approx    CYSTOSCOPY N/A 3/13/2019     Procedure: CYSTOSCOPY;  Surgeon: Rui Lozano DO;  Location: [facility] OR;  Service: OB/GYN;  Laterality: N/A;    DILATION AND CURETTAGE OF UTERUS  11/20/2018    Procedure: DILATION AND CURETTAGE, UTERUS;  Surgeon: Arvind Russell MD;  Location: [facility] OR;  Service: OB/GYN;;    HYSTERECTOMY  03/13/2019    Robotic DVH/ BSO     HYSTEROSCOPY N/A 11/20/2018    Procedure: HYSTEROSCOPY; TRUCLEAR RESECTION OF UTERINE POLYP;  Surgeon: Arvind Russell MD;  Location: [facility] OR;  Service: OB/GYN;  Laterality: N/A;    LAPAROSCOPIC GASTRIC BANDING  2006    POSTERIOR REPAIR N/A 3/13/2019    Procedure: COLPORRHAPHY, POSTERIOR;  Surgeon: Rui Lozano DO;  Location: [facility] OR;  Service: OB/GYN;  Laterality: N/A;    ROBOT-ASSISTED LAPAROSCOPIC ABDOMINAL SACROCOLPOPEXY N/A 3/13/2019    Procedure: ROBOTIC SACROCOLPOPEXY, ABDOMEN;  Surgeon: Rui Lozano DO;  Location: [facility];  Service: OB/GYN;  Laterality: N/A;    ROBOT-ASSISTED LAPAROSCOPIC HYSTERECTOMY N/A 3/13/2019    Procedure: ROBOTIC HYSTERECTOMY;  Surgeon: Arvind Russell MD;  Location: [facility] OR;  Service: OB/GYN;  Laterality: N/A;    ROBOT-ASSISTED LAPAROSCOPIC SALPINGO-OOPHORECTOMY Bilateral 3/13/2019    Procedure: ROBOTIC SALPINGO-OOPHORECTOMY;  Surgeon: Arvind Russell MD;  Location: [facility];  Service: OB/GYN;  Laterality: Bilateral;    SLEEVE GASTROPLASTY  08/2015       Family History:  family history includes Diabetes in her mother and sister; Heart disease in her father; Stroke in her sister.     Social History:  Social History     Socioeconomic History    Marital status:    Occupational History    Occupation: retired   Tobacco Use    Smoking status: Never    Smokeless tobacco: Never   Substance and Sexual Activity    Alcohol use: No    Drug use: No    Sexual activity: Yes     Partners: Male     Birth control/protection: Post-menopausal, Surgical     Comment: :     DVH/BSO   3-13-19       Review of Systems:   Review of Systems   Constitutional:   Negative for fever and weight loss.   HENT:  Negative for congestion, hearing loss and sore throat.    Eyes:  Negative for blurred vision.   Respiratory:  Negative for cough and shortness of breath.    Cardiovascular:  Negative for chest pain, palpitations, claudication and leg swelling.   Gastrointestinal:  Negative for abdominal pain, constipation, diarrhea, heartburn and vomiting.   Genitourinary:  Negative for dysuria.   Musculoskeletal:  Negative for back pain and myalgias.   Skin:  Negative for rash.   Neurological:  Negative for focal weakness and headaches.   Psychiatric/Behavioral:  Negative for depression and suicidal ideas. The patient is not nervous/anxious.       Medications:  Outpatient Encounter Medications as of 6/13/2023   Medication Sig Note Dispense Refill    antiox #8/om3/dha/epa/lut/zeax (PRESERVISION AREDS 2, OMEGA-3, ORAL) Take 1 tablet by mouth once daily.       ascorbic acid, vitamin C, (VITAMIN C) 100 MG tablet Take 100 mg by mouth once daily.       aspirin (ECOTRIN) 81 MG EC tablet Take 81 mg by mouth once daily.       biotin 1 mg Cap Take by mouth.       calcium citrate-vitamin D3 200 mg calcium -250 unit Tab Take by mouth.       cetirizine (ZYRTEC) 10 MG tablet Take 10 mg by mouth once daily.       fexofenadine (ALLEGRA) 180 MG tablet Take 180 mg by mouth once daily.       ibuprofen (ADVIL,MOTRIN) 200 MG tablet Take 200 mg by mouth every 6 (six) hours as needed for Pain.       krill oil 500 mg Cap Take by mouth once daily.       LIALDA 1.2 gram TbEC 2.4 g daily with breakfast.  10/4/2017: Received from: External Pharmacy      melatonin (MELATIN ORAL) Take by mouth.       metoprolol succinate (TOPROL-XL) 25 MG 24 hr tablet TK 1  T PO QHS PRN HEART  90 tablet 3    omeprazole (PRILOSEC) 20 MG capsule TAKE ONE CAPSULE BY MOUTH ONCE DAILY  90 capsule 2    PROAIR HFA 90 mcg/actuation inhaler INHALE 2 PUFFS BY MOUTH EVERY 4 HOURS AS NEEDED FOR COUGH OR SHORTNESS OF BREATH       pulse oximeter  "(PULSE OXIMETER) device by Apply Externally route 2 (two) times a day. Use twice daily at 8 AM and 3 PM and [facility] directed.  1 each 0    rosuvastatin (CRESTOR) 20 MG tablet Take 1 tablet (20 mg total) by mouth once daily.  90 tablet 3    simethicone (GAS-X ORAL) Take by mouth once daily.        [DISCONTINUED] lisinopriL (PRINIVIL,ZESTRIL) 20 MG tablet Take 1 tablet (20 mg total) by mouth once daily.  90 tablet 3    lisinopriL (PRINIVIL,ZESTRIL) 20 MG tablet Take 1 tablet (20 mg total) by mouth once daily.  90 tablet 3     No facility-administered encounter medications on file as of 6/13/2023.       Allergies:  Review of patient's allergies indicates:   Allergen Reactions    Bactrim [sulfamethoxazole-trimethoprim] Itching         Physical Exam      Vitals:    06/13/23 1524   BP: 120/80   Pulse: 67   Resp: 18   Temp: 98 °F (36.7 °C)        Vital Signs  Temp: 98 °F (36.7 °C)  Temp Source: Oral  Pulse: 67  Resp: 18  SpO2: 97 %  BP: 120/80  BP Location: Right arm  Patient Position: Sitting  Pain Score: 0-No pain  Height and Weight  Height: 5' 1" (154.9 cm)  Weight: 119.8 kg (264 lb 1.8 oz)  BSA (Calculated - sq m): 2.27 sq meters  BMI (Calculated): 49.9  Weight in (lb) to have BMI = 25: 132]     Body mass index is 49.9 kg/m².    Physical Exam  Constitutional:       Appearance: She is well-developed. She is obese.   Eyes:      Pupils: Pupils are equal, round, and reactive to light.   Neck:      Thyroid: No thyromegaly.   Cardiovascular:      Rate and Rhythm: Normal rate.      Heart sounds: Normal heart sounds. No murmur heard.    No friction rub. No gallop.   Pulmonary:      Breath sounds: Normal breath sounds.   Abdominal:      General: Bowel sounds are normal.      Palpations: Abdomen is soft.   Musculoskeletal:         General: Normal range of motion.      Cervical back: Normal range of motion.   Lymphadenopathy:      Cervical: No cervical adenopathy.   Skin:     General: Skin is warm.      " Findings: No rash.   Neurological:      Mental Status: She is alert and oriented to person, place, and time.      Cranial Nerves: No cranial nerve deficit.   Psychiatric:         Behavior: Behavior normal.        Laboratory:  CBC:  Recent Labs   Lab Result Units 04/21/23  0805   WBC Thousand/uL 4.8   RBC Million/uL 4.29   Hemoglobin g/dL 13.6   Hematocrit % 40.5   Platelets Thousand/uL 219   MCV fL 94.4   MCH pg 31.7   MCHC g/dL 33.6     CMP:  Recent Labs   Lab Result Units 04/21/23  0805   Glucose mg/dL 91   Calcium mg/dL 9.4   Albumin g/dL 4.1   Total Protein g/dL 6.8   Sodium mmol/L 141   Potassium mmol/L 4.6   CO2 mmol/L 30   Chloride mmol/L 104   BUN mg/dL 20   ALT U/L 17   AST U/L 17   Total Bilirubin mg/dL 0.4     URINALYSIS:  No results for input(s): COLORU, CLARITYU, SPECGRAV, PHUR, PROTEINUA, GLUCOSEU, BILIRUBINCON, BLOODU, WBCU, RBCU, BACTERIA, MUCUS, NITRITE, LEUKOCYTESUR, UROBILINOGEN, HYALINECASTS in the last 2160 hours.   LIPIDS:  Recent Labs   Lab Result Units 04/21/23  0805   TSH mIU/L 1.84   HDL mg/dL 58   Cholesterol mg/dL 154   Triglycerides mg/dL 118   LDL Cholesterol mg/dL (calc) 76   HDL/Cholesterol Ratio (calc) 2.7   Non HDL Chol. (LDL+VLDL) mg/dL (calc) 96     TSH:  Recent Labs   Lab Result Units 04/21/23  0805   TSH mIU/L 1.84     A1C:  No results for input(s): HGBA1C in the last 2160 hours.    Radiology:        Assessment:     Jenny Caro is a 67 y.o.female with:    Gastroesophageal reflux disease without esophagitis    ASCVD (arteriosclerotic cardiovascular disease)    Mixed hyperlipidemia    Hypertensive heart disease without heart failure  -     lisinopriL (PRINIVIL,ZESTRIL) 20 MG tablet; Take 1 tablet (20 mg total) by mouth once daily.  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 Gi managing dr whitfield . She is asymptomatic            As above, continue current medications and maintain follow up with specialists.  Return to clinic in 6 months.      Frederick W Dantagnan [facility]

## 2023-06-21 DIAGNOSIS — Z78.0 MENOPAUSE: ICD-10-CM

## 2023-07-11 DIAGNOSIS — K21.9 GASTROESOPHAGEAL REFLUX DISEASE WITHOUT ESOPHAGITIS: Chronic | ICD-10-CM

## 2023-07-11 RX ORDER — OMEPRAZOLE 20 MG/1
20 CAPSULE, DELAYED RELEASE ORAL DAILY
Qty: 90 CAPSULE | Refills: 3 | Status: SHIPPED | OUTPATIENT
Start: 2023-07-11

## 2023-07-11 NOTE — TELEPHONE ENCOUNTER
No care due was identified.  [facility] Embedded Care Due Messages. Reference number: [id number].   7/11/2023 8:06:50 AM CDT

## 2023-07-11 NOTE — TELEPHONE ENCOUNTER
Refill Decision Note   Jenny Vania  is requesting a refill authorization.  Brief Assessment and Rationale for Refill:  Approve     Medication Therapy Plan:       Medication Reconciliation Completed: No    Comments:     No Care Gaps recommended.     Note composed:3:08 PM 07/11/2023

## 2023-08-08 ENCOUNTER — OFFICE VISIT (OUTPATIENT)
Dept: CARDIOLOGY | Facility: CLINIC | Age: 68
End: 2023-08-08
Payer: MEDICARE

## 2023-08-08 VITALS
HEIGHT: 61 IN | HEART RATE: 82 BPM | SYSTOLIC BLOOD PRESSURE: 162 MMHG | BODY MASS INDEX: 51.16 KG/M2 | WEIGHT: 271 LBS | DIASTOLIC BLOOD PRESSURE: 89 MMHG | RESPIRATION RATE: 20 BRPM

## 2023-08-08 DIAGNOSIS — E66.01 MORBID OBESITY WITH BODY MASS INDEX (BMI) OF 45.0 TO 49.9 IN ADULT: ICD-10-CM

## 2023-08-08 DIAGNOSIS — E78.2 MIXED HYPERLIPIDEMIA: ICD-10-CM

## 2023-08-08 DIAGNOSIS — I25.10 ASCVD (ARTERIOSCLEROTIC CARDIOVASCULAR DISEASE): Primary | ICD-10-CM

## 2023-08-08 DIAGNOSIS — I11.9 HYPERTENSIVE HEART DISEASE WITHOUT HEART FAILURE: ICD-10-CM

## 2023-08-08 PROCEDURE — 99214 OFFICE O/P EST MOD 30 MIN: CPT | Mod: PBBFAC | Performed by: INTERNAL MEDICINE

## 2023-08-08 PROCEDURE — 99214 PR OFFICE/OUTPT VISIT, EST, LEVL IV, 30-39 MIN: ICD-10-PCS | Mod: S$PBB,,, | Performed by: INTERNAL MEDICINE

## 2023-08-08 PROCEDURE — 99999 PR PBB SHADOW E&M-EST. PATIENT-LVL IV: CPT | Mod: PBBFAC,,, | Performed by: INTERNAL MEDICINE

## 2023-08-08 PROCEDURE — 99999 PR PBB SHADOW E&M-EST. PATIENT-LVL IV: ICD-10-PCS | Mod: PBBFAC,,, | Performed by: INTERNAL MEDICINE

## 2023-08-08 PROCEDURE — 99214 OFFICE O/P EST MOD 30 MIN: CPT | Mod: S$PBB,,, | Performed by: INTERNAL MEDICINE

## 2023-08-08 NOTE — PROGRESS NOTES
HISTORY:    67 yo F w a h/o SVT, nonobs CAD, hypertension, hyperlipidemia, obesity, UC, and OA presenting for follow-up.    The patient denies any symptoms of chest pain, shortness of breath, or dyspnea on exertion. Activity levels are mild to moderate by choice. Completes ADLs. Limited by fatigue and ankle pain 2/2 OA.    The patient denies any previous history of myocardial infarction, coronary artery disease, peripheral arterial disease, stroke, congestive heart failure, or cardiomyopathy.    H/o SVT that has not been an issue recently. Currently tolerates metoprolol 25x1, lisinopril 20x1, and rosuvastatin 20x1.      PHYSICAL EXAM:    Vitals:    08/08/23 1330   BP: (!) 162/89   Pulse: 82   Resp: 20       NAD, A+Ox3.  No jvd, no bruit.  RRR nml s1,s2. No murmurs.  CTA B no wheezes or crackles.  2+ B radial and 1+ B DP/PT. No edema.    LABS/STUDIES (imaging reviewed during clinic visit):    April 2023 CBC and CMP normal.  LDL 76 and HDL 58.  Triglycerides 118.    ECG 2023 NSR no Qs/Sts. NSIVCD.    OSH records at   Coronary CTA 2021 Calcium score 260 Nonobs CAD.      ASSESSMENT & PLAN:    1. ASCVD (arteriosclerotic cardiovascular disease)    2. Mixed hyperlipidemia    3. Hypertensive heart disease without heart failure    4. Morbid obesity with body mass index (BMI) of 45.0 to 49.9 in adult          Orders Placed This Encounter    Echo        Nonobs CAD w an elevated calcium score. Tolerating rosuvastatin 20x1. LDL 76. Would increase to 40x1.    Bps controlled at home on metoprolol/lisinopril. Elevated today. Patient to keep an eye on this.     H/o SVT without palpitations at this time.    Pt understands the importance of weight loss and activity. She is trying to optimize these risk factors.     Follow up in about 1 year (around 8/8/2024).      Janina Wheeler MD

## 2023-08-22 DIAGNOSIS — E78.5 HYPERLIPIDEMIA, UNSPECIFIED HYPERLIPIDEMIA TYPE: ICD-10-CM

## 2023-08-22 RX ORDER — ROSUVASTATIN CALCIUM 20 MG/1
40 TABLET, COATED ORAL DAILY
Qty: 90 TABLET | Refills: 3 | Status: SHIPPED | OUTPATIENT
Start: 2023-08-22 | End: 2024-04-03

## 2023-08-25 ENCOUNTER — E-VISIT (OUTPATIENT)
Dept: PRIMARY CARE CLINIC | Facility: CLINIC | Age: 68
End: 2023-08-25
Payer: MEDICARE

## 2023-08-25 ENCOUNTER — TELEPHONE (OUTPATIENT)
Dept: PRIMARY CARE CLINIC | Facility: CLINIC | Age: 68
End: 2023-08-25
Payer: MEDICARE

## 2023-08-25 DIAGNOSIS — N30.00 ACUTE CYSTITIS WITHOUT HEMATURIA: Primary | ICD-10-CM

## 2023-08-25 PROCEDURE — 99421 OL DIG E/M SVC 5-10 MIN: CPT | Mod: ,,, | Performed by: INTERNAL MEDICINE

## 2023-08-25 PROCEDURE — 99421 PR E&M, ONLINE DIGIT, EST, < 7 DAYS, 5-10 MINS: ICD-10-PCS | Mod: ,,, | Performed by: INTERNAL MEDICINE

## 2023-08-25 RX ORDER — PHENAZOPYRIDINE HYDROCHLORIDE 200 MG/1
200 TABLET, FILM COATED ORAL 3 TIMES DAILY PRN
Qty: 6 TABLET | Refills: 0 | Status: SHIPPED | OUTPATIENT
Start: 2023-08-25 | End: 2023-09-04

## 2023-08-25 RX ORDER — NITROFURANTOIN 25; 75 MG/1; MG/1
100 CAPSULE ORAL 2 TIMES DAILY
Qty: 14 CAPSULE | Refills: 0 | Status: SHIPPED | OUTPATIENT
Start: 2023-08-25

## 2023-08-25 NOTE — PROGRESS NOTES
Patient ID: Jenny Caro is a 68 y.o. female.    Chief Complaint: Urinary Tract Infection (Entered automatically based on patient selection in Patient Portal.)    The patient initiated a request through [employer] on 8/25/2023 for evaluation and management with a chief complaint of Urinary Tract Infection (Entered automatically based on patient selection in Patient Portal.)     I evaluated the questionnaire submission on 08/25/2023.    [facility] P[facility] Evisit Uti Questionnaire    8/25/2023 10:52 AM CDT - Filed by Patient   Do you agree to participate in an E-Visit? Yes   If you have any of the following problems, please present to your local ER or call 911:  I acknowledge   What is the main issue that you would like for your doctor to address today? burning and frequent urinations   Are you able to take your vital signs? Yes   Systolic Blood Pressure: 167   Diastolic Blood Pressure: 99   Weight: 270   Height: 61   Pulse: 79   Temperature: 98.5   Respiration rate:    Pulse Oxygen:    What symptoms do you currently have? Pain while passing urine   When did your symptoms first appear? 8/25/2023   List what you have done or taken to help your symptoms. aso tablets   Please indicate whether you have had the following symptoms during the past 24 hours     Urgent urination (a sudden and uncontrollable urge to urinate) Mild   Frequent urination of small amounts of urine (going to the toilet very often) Mild   Burning pain when urinating Mild   Incomplete bladder emptying (still feel like you need to urinate again after urination) Mild   Pain not associated with urination in the lower abdomen below the belly button) None   What does your urine look like? Clear   Blood seen in the urine None   Flank pain (pain in one or both sides of the lower back) None   Abnormal Vaginal Discharge (abnormal amount, color and/or odor) None   Discharge from the urethra (urinary opening) without urination None   High body temperature/fever? None-<99.5    Please rate how much discomfort you have experience because of the symptoms in the past 24 hours: Mild   Please indicate how the symptoms have interfered with your every day activities/work in the past 24 hours: Mild   Please indicate how these symptoms have interfered with your social activities (visiting people, meeting with friends, etc.) in the past 24 hours? None   Are you a diabetic? No   Please indicate whether you have the following at the time of completion of this questionnaire: None of the above   Provide any information you feel is important to your history not asked above allergic to bactrim   Please attach any relevant images or files (if you have performed a home test for UTI, please submit a photo of results)          Recent Labs Obtained:  No visits with results within 7 Day(s) from this visit.   Latest known visit with results is:   Orders Only on 04/21/2023   Component Date Value Ref Range Status    Cholesterol 04/21/2023 154  <200 mg/dL Final    HDL 04/21/2023 58  > OR = 50 mg/dL Final    Triglycerides 04/21/2023 118  <150 mg/dL Final    LDL Cholesterol 04/21/2023 76  mg/dL (calc) Final    Comment: Reference range: <100     Desirable range <100 mg/dL for primary prevention;    <70 mg/dL for patients with CHD or diabetic patients   with > or = 2 CHD risk factors.     LDL-C is now calculated using the Kenneth-Sara   calculation, which is a validated novel method providing   better accuracy than the Friedewald equation in the   estimation of LDL-C.   Kenneth JUAREZ et al. MARIS. 2013;310(19): 4877-3908   (http://education.[employer].com/faq/BDS987)      HDL/Cholesterol Ratio 04/21/2023 2.7  <5.0 (calc) Final    Non HDL Chol. (LDL+VLDL) 04/21/2023 96  <130 mg/dL (calc) Final    Comment: For patients with diabetes plus 1 major ASCVD risk   factor, treating to a non-HDL-C goal of <100 mg/dL   (LDL-C of <70 mg/dL) is considered a therapeutic   option.      Glucose 04/21/2023 91  65 - 99 mg/dL Final     Comment:               Fasting reference interval         BUN 04/21/2023 20  7 - 25 mg/dL Final    Creatinine 04/21/2023 0.69  0.50 - 1.05 mg/dL Final    eGFR 04/21/2023 94  > OR = 60 mL/min/1.73m2 Final    Comment: The eGFR is based on the CKD-EPI 2021 equation. To calculate   the new eGFR from a previous Creatinine or Cystatin C  result, go to https://www.kidney.org/professionals/  kdoqi/gfr%5Fcalculator      BUN/Creatinine Ratio 04/21/2023 NOT APPLICABLE  6 - 22 (calc) Final    Sodium 04/21/2023 141  135 - 146 mmol/L Final    Potassium 04/21/2023 4.6  3.5 - 5.3 mmol/L Final    Chloride 04/21/2023 104  98 - 110 mmol/L Final    CO2 04/21/2023 30  20 - 32 mmol/L Final    Calcium 04/21/2023 9.4  8.6 - 10.4 mg/dL Final    Total Protein 04/21/2023 6.8  6.1 - 8.1 g/dL Final    Albumin 04/21/2023 4.1  3.6 - 5.1 g/dL Final    Globulin, Total 04/21/2023 2.7  1.9 - 3.7 g/dL (calc) Final    Albumin/Globulin Ratio 04/21/2023 1.5  1.0 - 2.5 (calc) Final    Total Bilirubin 04/21/2023 0.4  0.2 - 1.2 mg/dL Final    Alkaline Phosphatase 04/21/2023 61  37 - 153 U/L Final    AST 04/21/2023 17  10 - 35 U/L Final    ALT 04/21/2023 17  6 - 29 U/L Final    WBC 04/21/2023 4.8  3.8 - 10.8 Thousand/uL Final    RBC 04/21/2023 4.29  3.80 - 5.10 Million/uL Final    Hemoglobin 04/21/2023 13.6  11.7 - 15.5 g/dL Final    Hematocrit 04/21/2023 40.5  35.0 - 45.0 % Final    MCV 04/21/2023 94.4  80.0 - 100.0 fL Final    MCH 04/21/2023 31.7  27.0 - 33.0 pg Final    MCHC 04/21/2023 33.6  32.0 - 36.0 g/dL Final    RDW 04/21/2023 13.8  11.0 - 15.0 % Final    Platelets 04/21/2023 219  140 - 400 Thousand/uL Final    MPV 04/21/2023 10.8  7.5 - 12.5 fL Final    Neutrophils, Abs 04/21/2023 2,818  1,500 - 7,800 cells/uL Final    Lymph # 04/21/2023 1,430  850 - 3,900 cells/uL Final    Mono # 04/21/2023 360  200 - 950 cells/uL Final    Eos # 04/21/2023 173  15 - 500 cells/uL Final    Baso # 04/21/2023 19  0 - 200 cells/uL Final    Neutrophils Relative  04/21/2023 58.7  % Final    Lymph % 04/21/2023 29.8  % Final    Mono % 04/21/2023 7.5  % Final    Eosinophil % 04/21/2023 3.6  % Final    Basophil % 04/21/2023 0.4  % Final    TSH 04/21/2023 1.84  0.40 - 4.50 mIU/L Final       Encounter Diagnosis   Name Primary?    Acute cystitis without hematuria Yes        No orders of the defined types were placed in this encounter.     Medications Ordered This Encounter   Medications    nitrofurantoin, macrocrystal-monohydrate, (MACROBID) 100 MG capsule     Sig: Take 1 capsule (100 mg total) by mouth 2 (two) times daily.     Dispense:  14 capsule     Refill:  0    phenazopyridine (PYRIDIUM) 200 MG tablet     Sig: Take 1 tablet (200 mg total) by mouth 3 (three) times daily as needed for Pain.     Dispense:  6 tablet     Refill:  0        No follow-ups on file.      E-Visit Time Tracking:    Day 1 Time (in minutes): 5     Total Time (in minutes): 5

## 2023-08-25 NOTE — TELEPHONE ENCOUNTER
----- Message from Cierra Glaser sent at 8/25/2023 10:17 AM CDT -----  Contact: [phone] or [phone]  [email or web address]     Patient is calling for Medical Advice regarding:bladder infection    How long has patient had these symptoms: today    Pharmacy name and phone#:  [id number] - [place] [id number] [address] [address]  Phone: [phone] Fax: [phone]       Would like response via [facility] [facility]:  no    Comments:pt states she woke up this morning and she has the testing strips for the bladder infection and she is leaving to go out of town this afternoon

## 2023-10-05 ENCOUNTER — HOSPITAL ENCOUNTER (OUTPATIENT)
Dept: CARDIOLOGY | Facility: HOSPITAL | Age: 68
Discharge: HOME | End: 2023-10-05
Attending: INTERNAL MEDICINE
Payer: MEDICARE

## 2023-10-05 VITALS
HEART RATE: 70 BPM | WEIGHT: 271 LBS | DIASTOLIC BLOOD PRESSURE: 95 MMHG | HEIGHT: 61 IN | BODY MASS INDEX: 51.16 KG/M2 | SYSTOLIC BLOOD PRESSURE: 160 MMHG

## 2023-10-05 DIAGNOSIS — I11.9 HYPERTENSIVE HEART DISEASE WITHOUT HEART FAILURE: ICD-10-CM

## 2023-10-05 LAB
ASCENDING AORTA: 3.54 CM
AV INDEX (PROSTH): 0.74
AV MEAN GRADIENT: 8 MMHG
AV PEAK GRADIENT: 15 MMHG
AV VALVE AREA BY VELOCITY RATIO: 3.07 CM²
AV VALVE AREA: 3.06 CM²
AV VELOCITY RATIO: 0.74
BSA FOR ECHO PROCEDURE: 2.3 M2
CV ECHO LV RWT: 0.35 CM
DOP CALC AO PEAK VEL: 1.95 M/S
DOP CALC AO VTI: 41.3 CM
DOP CALC LVOT AREA: 4.2 CM2
DOP CALC LVOT DIAMETER: 2.3 CM
DOP CALC LVOT PEAK VEL: 1.44 M/S
DOP CALC LVOT STROKE VOLUME: 126.49 CM3
DOP CALC MV VTI: 28.99 CM
DOP CALCLVOT PEAK VEL VTI: 30.46 CM
E WAVE DECELERATION TIME: 268.34 MSEC
E/A RATIO: 0.74
E/E' RATIO: 12.57 M/S
ECHO LV POSTERIOR WALL: 0.82 CM (ref 0.6–1.1)
FRACTIONAL SHORTENING: 29 % (ref 28–44)
INTERVENTRICULAR SEPTUM: 0.79 CM (ref 0.6–1.1)
LA MAJOR: 5.04 CM
LA MINOR: 5.24 CM
LA WIDTH: 3.88 CM
LEFT ATRIUM SIZE: 4.32 CM
LEFT ATRIUM VOLUME INDEX MOD: 20.3 ML/M2
LEFT ATRIUM VOLUME INDEX: 34 ML/M2
LEFT ATRIUM VOLUME MOD: 43.74 CM3
LEFT ATRIUM VOLUME: 73.2 CM3
LEFT INTERNAL DIMENSION IN SYSTOLE: 3.29 CM (ref 2.1–4)
LEFT VENTRICLE DIASTOLIC VOLUME INDEX: 45.76 ML/M2
LEFT VENTRICLE DIASTOLIC VOLUME: 98.39 ML
LEFT VENTRICLE MASS INDEX: 56 G/M2
LEFT VENTRICLE SYSTOLIC VOLUME INDEX: 20.4 ML/M2
LEFT VENTRICLE SYSTOLIC VOLUME: 43.92 ML
LEFT VENTRICULAR INTERNAL DIMENSION IN DIASTOLE: 4.62 CM (ref 3.5–6)
LEFT VENTRICULAR MASS: 119.74 G
LV LATERAL E/E' RATIO: 12.57 M/S
LV SEPTAL E/E' RATIO: 12.57 M/S
MV A" WAVE DURATION": 11.7 MSEC
MV MEAN GRADIENT: 2 MMHG
MV PEAK A VEL: 1.19 M/S
MV PEAK E VEL: 0.88 M/S
MV PEAK GRADIENT: 5 MMHG
MV VALVE AREA BY CONTINUITY EQUATION: 4.36 CM2
PULM VEIN S/D RATIO: 1.55
PV PEAK D VEL: 0.4 M/S
PV PEAK S VEL: 0.62 M/S
RA MAJOR: 4.6 CM
RA PRESSURE ESTIMATED: 3 MMHG
RA WIDTH: 3.2 CM
RIGHT VENTRICULAR END-DIASTOLIC DIMENSION: 2.62 CM
SINUS: 3.26 CM
STJ: 3.5 CM
TDI LATERAL: 0.07 M/S
TDI SEPTAL: 0.07 M/S
TDI: 0.07 M/S
TRICUSPID ANNULAR PLANE SYSTOLIC EXCURSION: 1.98 CM
Z-SCORE OF LEFT VENTRICULAR DIMENSION IN END DIASTOLE: -4.12
Z-SCORE OF LEFT VENTRICULAR DIMENSION IN END SYSTOLE: -2.03

## 2023-10-05 PROCEDURE — 93306 ECHO (CUPID ONLY): ICD-10-PCS | Mod: 26,,, | Performed by: INTERNAL MEDICINE

## 2023-10-05 PROCEDURE — 93306 TTE W/DOPPLER COMPLETE: CPT | Mod: 26,,, | Performed by: INTERNAL MEDICINE

## 2023-10-05 PROCEDURE — 93306 TTE W/DOPPLER COMPLETE: CPT

## 2023-11-16 ENCOUNTER — PATIENT MESSAGE (OUTPATIENT)
Dept: PRIMARY CARE CLINIC | Facility: CLINIC | Age: 68
End: 2023-11-16
Payer: MEDICARE

## 2023-12-26 DIAGNOSIS — I11.9 HYPERTENSIVE HEART DISEASE WITHOUT HEART FAILURE: ICD-10-CM

## 2023-12-26 RX ORDER — METOPROLOL SUCCINATE 25 MG/1
TABLET, EXTENDED RELEASE ORAL
Qty: 90 TABLET | Refills: 0 | Status: SHIPPED | OUTPATIENT
Start: 2023-12-26 | End: 2024-03-21

## 2024-01-09 ENCOUNTER — OFFICE VISIT (OUTPATIENT)
Dept: PRIMARY CARE CLINIC | Facility: CLINIC | Age: 69
End: 2024-01-09
Payer: MEDICARE

## 2024-01-09 VITALS
SYSTOLIC BLOOD PRESSURE: 120 MMHG | RESPIRATION RATE: 18 BRPM | HEART RATE: 70 BPM | DIASTOLIC BLOOD PRESSURE: 84 MMHG | TEMPERATURE: 98 F | HEIGHT: 61 IN | WEIGHT: 267.88 LBS | BODY MASS INDEX: 50.58 KG/M2 | OXYGEN SATURATION: 96 %

## 2024-01-09 DIAGNOSIS — I48.0 PAROXYSMAL ATRIAL FIBRILLATION: ICD-10-CM

## 2024-01-09 DIAGNOSIS — E66.01 MORBID OBESITY WITH BODY MASS INDEX (BMI) OF 45.0 TO 49.9 IN ADULT: ICD-10-CM

## 2024-01-09 DIAGNOSIS — I11.9 HYPERTENSIVE HEART DISEASE WITHOUT HEART FAILURE: ICD-10-CM

## 2024-01-09 DIAGNOSIS — K51.919 ULCERATIVE COLITIS WITH COMPLICATION, UNSPECIFIED LOCATION: ICD-10-CM

## 2024-01-09 DIAGNOSIS — I25.10 ASCVD (ARTERIOSCLEROTIC CARDIOVASCULAR DISEASE): ICD-10-CM

## 2024-01-09 DIAGNOSIS — E78.2 MIXED HYPERLIPIDEMIA: Primary | ICD-10-CM

## 2024-01-09 PROCEDURE — 99214 OFFICE O/P EST MOD 30 MIN: CPT | Mod: S$PBB,,, | Performed by: INTERNAL MEDICINE

## 2024-01-09 PROCEDURE — 99999 PR PBB SHADOW E&M-EST. PATIENT-LVL IV: CPT | Mod: PBBFAC,,, | Performed by: INTERNAL MEDICINE

## 2024-01-09 PROCEDURE — 99214 OFFICE O/P EST MOD 30 MIN: CPT | Mod: PBBFAC | Performed by: INTERNAL MEDICINE

## 2024-01-09 RX ORDER — LISINOPRIL 20 MG/1
20 TABLET ORAL DAILY
Qty: 90 TABLET | Refills: 3 | Status: SHIPPED | OUTPATIENT
Start: 2024-01-09 | End: 2025-01-08

## 2024-01-09 NOTE — PROGRESS NOTES
[facility] Note    Chief Complaint      Chief Complaint   Patient presents with    Follow-up     6m       History of Present Illness      Jenny Caro is a 68 y.o. female who presents today for   Chief Complaint   Patient presents with    Follow-up     6m   .  Patient comes to appointment here for 6m checkup for chronic issues as below . She is stable on her current regimen . Is seeing dr nguyễn for cardiac issues . She adits to some dietary in discretion during holidays .     Problem List Items Addressed This Visit          Cardiac/Vascular    Hyperlipidemia - Primary    Overview     Stable on current crestor 10 mg  cont current          ASCVD (arteriosclerotic cardiovascular disease)    Overview     Dr nguyễn managing is now on rosuvastatin  , she is also on asa , and metoprolol and crestor for secondary prevention          Hypertensive heart disease without heart failure    Overview     Bp looks great cont current         Paroxysmal atrial fibrillation       Endocrine    Morbid obesity with body mass index (BMI) of 45.0 to 49.9 in adult    Overview     counciled again on diet and exercise            GI    Ulcerative colitis (Chronic)    Overview     Cont lialda . Gi managing dr whitfield . She is asymptomatic              Past Medical History:  Past Medical History:   Diagnosis Date    Abnormal Pap smear of cervix 2009    LEEP 2009, mild dysplasia normal paps since    Arthritis     ankles  born with club feet    GERD (gastroesophageal reflux disease)     HPV in female     Hyperlipidemia     Hypertension     Ulcerative colitis        Past Surgical History:  Past Surgical History:   Procedure Laterality Date    CERVICAL BIOPSY  W/ LOOP ELECTRODE EXCISION  2009    COLONOSCOPY  2013    approx    CYSTOSCOPY N/A 3/13/2019    Procedure: CYSTOSCOPY;  Surgeon: Rui Lozano DO;  Location: [facility] OR;  Service: OB/GYN;  Laterality: N/A;    DILATION AND CURETTAGE OF UTERUS  11/20/2018     Procedure: DILATION AND CURETTAGE, UTERUS;  Surgeon: Arvind Russell MD;  Location: [facility];  Service: OB/GYN;;    HYSTERECTOMY  03/13/2019    Robotic DV/ BSO     HYSTEROSCOPY N/A 11/20/2018    Procedure: HYSTEROSCOPY; TRUCLEAR RESECTION OF UTERINE POLYP;  Surgeon: Arvind Russell MD;  Location: [facility];  Service: OB/GYN;  Laterality: N/A;    LAPAROSCOPIC GASTRIC BANDING  2006    POSTERIOR REPAIR N/A 3/13/2019    Procedure: COLPORRHAPHY, POSTERIOR;  Surgeon: Rui Lozano DO;  Location: [facility] OR;  Service: OB/GYN;  Laterality: N/A;    ROBOT-ASSISTED LAPAROSCOPIC ABDOMINAL SACROCOLPOPEXY N/A 3/13/2019    Procedure: ROBOTIC SACROCOLPOPEXY, ABDOMEN;  Surgeon: Rui Lozano DO;  Location: [facility];  Service: OB/GYN;  Laterality: N/A;    ROBOT-ASSISTED LAPAROSCOPIC HYSTERECTOMY N/A 3/13/2019    Procedure: ROBOTIC HYSTERECTOMY;  Surgeon: Arvind Russell MD;  Location: [facility];  Service: OB/GYN;  Laterality: N/A;    ROBOT-ASSISTED LAPAROSCOPIC SALPINGO-OOPHORECTOMY Bilateral 3/13/2019    Procedure: ROBOTIC SALPINGO-OOPHORECTOMY;  Surgeon: Arvind Russell MD;  Location: [facility];  Service: OB/GYN;  Laterality: Bilateral;    SLEEVE GASTROPLASTY  08/2015       Family History:  family history includes Diabetes in her mother and sister; Heart disease in her father; Stroke in her sister.    Social History:  Social History     Socioeconomic History    Marital status:    Occupational History    Occupation: retired   Tobacco Use    Smoking status: Never    Smokeless tobacco: Never   Substance and Sexual Activity    Alcohol use: No    Drug use: No    Sexual activity: Yes     Partners: Male     Birth control/protection: Post-menopausal, Surgical     Comment: :     DVH/BSO   3-13-19       Review of Systems:   Review of Systems   Constitutional:  Negative for fever and weight loss.   HENT:  Negative for congestion, hearing loss and sore throat.    Eyes:  Negative for blurred vision.   Respiratory:  Negative for  cough and shortness of breath.    Cardiovascular:  Negative for chest pain, palpitations, claudication and leg swelling.   Gastrointestinal:  Negative for abdominal pain, constipation, diarrhea and heartburn.   Genitourinary:  Negative for dysuria.   Musculoskeletal:  Negative for back pain and myalgias.   Skin:  Negative for rash.   Neurological:  Negative for focal weakness and headaches.   Psychiatric/Behavioral:  Negative for depression and suicidal ideas. The patient is not nervous/anxious.          Medications:  Outpatient Encounter Medications as of 1/9/2024   Medication Sig Note Dispense Refill    antiox #8/om3/dha/epa/lut/zeax (PRESERVISION AREDS 2, OMEGA-3, ORAL) Take 1 tablet by mouth once daily.       ascorbic acid, vitamin C, (VITAMIN C) 100 MG tablet Take 100 mg by mouth once daily.       aspirin (ECOTRIN) 81 MG EC tablet Take 81 mg by mouth once daily.       biotin 1 mg Cap Take by mouth.       calcium citrate-vitamin D3 200 mg calcium -250 unit Tab Take by mouth.       cetirizine (ZYRTEC) 10 MG tablet Take 10 mg by mouth once daily.       fexofenadine (ALLEGRA) 180 MG tablet Take 180 mg by mouth once daily.       ibuprofen (ADVIL,MOTRIN) 200 MG tablet Take 200 mg by mouth every 6 (six) hours as needed for Pain.       krill oil 500 mg Cap Take by mouth once daily.       LIALDA 1.2 gram TbEC 2.4 g daily with breakfast.  10/4/2017: Received from: External Pharmacy      melatonin (MELATIN ORAL) Take by mouth.       metoprolol succinate (TOPROL-XL) 25 MG 24 hr tablet TAKE 1 TABLET BY MOUTH NIGHTLY AS NEEDED FOR  THE  HEART  90 tablet 0    nitrofurantoin, macrocrystal-monohydrate, (MACROBID) 100 MG capsule Take 1 capsule (100 mg total) by mouth 2 (two) times daily.  14 capsule 0    omeprazole (PRILOSEC) 20 MG capsule Take 1 capsule (20 mg total) by mouth once daily.  90 capsule 3    pulse oximeter (PULSE OXIMETER) device by Apply Externally route 2 (two) times a day. Use twice daily at 8 AM and 3 PM and  "record the value in [facility] as directed.  1 each 0    rosuvastatin (CRESTOR) 20 MG tablet Take 2 tablets (40 mg total) by mouth once daily.  90 tablet 3    simethicone (GAS-X ORAL) Take by mouth once daily.        [DISCONTINUED] lisinopriL (PRINIVIL,ZESTRIL) 20 MG tablet Take 1 tablet (20 mg total) by mouth once daily.  90 tablet 3    lisinopriL (PRINIVIL,ZESTRIL) 20 MG tablet Take 1 tablet (20 mg total) by mouth once daily.  90 tablet 3    [DISCONTINUED] metoprolol succinate (TOPROL-XL) 25 MG 24 hr tablet TK 1  T PO QHS PRN HEART  90 tablet 3    [DISCONTINUED] PROAIR HFA 90 mcg/actuation inhaler INHALE 2 PUFFS BY MOUTH EVERY 4 HOURS AS NEEDED FOR COUGH OR SHORTNESS OF BREATH        No facility-administered encounter medications on file as of 1/9/2024.        Allergies:  Review of patient's allergies indicates:   Allergen Reactions    Bactrim [sulfamethoxazole-trimethoprim] Itching         Physical Exam         Vitals:    01/09/24 1534   BP: 120/84   Pulse: 70   Resp: 18   Temp: 98 °F (36.7 °C)         Physical Exam  Constitutional:       Appearance: She is well-developed.   Eyes:      Pupils: Pupils are equal, round, and reactive to light.   Neck:      Thyroid: No thyromegaly.   Cardiovascular:      Rate and Rhythm: Normal rate.      Heart sounds: Normal heart sounds. No murmur heard.     No friction rub. No gallop.   Pulmonary:      Breath sounds: Normal breath sounds.   Abdominal:      General: Bowel sounds are normal.      Palpations: Abdomen is soft.   Musculoskeletal:         General: Normal range of motion.      Cervical back: Normal range of motion.   Lymphadenopathy:      Cervical: No cervical adenopathy.   Skin:     General: Skin is warm.      Findings: No rash.   Neurological:      Mental Status: She is alert and oriented to person, place, and time.      Cranial Nerves: No cranial nerve deficit.   Psychiatric:         Behavior: Behavior normal.          Laboratory:  CBC:  No results for input(s): "WBC", " ""RBC", "HGB", "HCT", "PLT", "MCV", "MCH", "MCHC" in the last 2160 hours.  CMP:  No results for input(s): "GLU", "CALCIUM", "ALBUMIN", "PROT", "NA", "K", "CO2", "CL", "BUN", "ALKPHOS", "ALT", "AST", "BILITOT" in the last 2160 hours.    Invalid input(s): "CREATININ"  URINALYSIS:  No results for input(s): "COLORU", "CLARITYU", "SPECGRAV", "PHUR", "PROTEINUA", "GLUCOSEU", "BILIRUBINCON", "BLOODU", "WBCU", "RBCU", "BACTERIA", "MUCUS", "NITRITE", "LEUKOCYTESUR", "UROBILINOGEN", "HYALINECASTS" in the last 2160 hours.   LIPIDS:  No results for input(s): "TSH", "HDL", "CHOL", "TRIG", "LDLCALC", "CHOLHDL", "NONHDLCHOL", "TOTALCHOLEST" in the last 2160 hours.  TSH:  No results for input(s): "TSH" in the last 2160 hours.  A1C:  No results for input(s): "HGBA1C" in the last 2160 hours.    Radiology:        Assessment:     Jenny Caro is a 68 y.o.female with:    Mixed hyperlipidemia    Hypertensive heart disease without heart failure  -     lisinopriL (PRINIVIL,ZESTRIL) 20 MG tablet; Take 1 tablet (20 mg total) by mouth once daily.  Dispense: 90 tablet; Refill: 3    Paroxysmal atrial fibrillation    Morbid obesity with body mass index (BMI) of 45.0 to 49.9 in adult    Ulcerative colitis with complication, unspecified location    ASCVD (arteriosclerotic cardiovascular disease)          Plan:     Problem List Items Addressed This Visit          Cardiac/Vascular    Hyperlipidemia - Primary    Overview     Stable on current crestor 10 mg  cont current          ASCVD (arteriosclerotic cardiovascular disease)    Overview     Dr nguyễn managing is now on rosuvastatin  , she is also on asa , and metoprolol and crestor for secondary prevention          Hypertensive heart disease without heart failure    Overview     Bp looks great cont current         Paroxysmal atrial fibrillation       Endocrine    Morbid obesity with body mass index (BMI) of 45.0 to 49.9 in adult    Overview     counciled again on diet and exercise            GI    " Ulcerative colitis (Chronic)    Overview     Cont lialda . Gi managing dr whitfield . She is asymptomatic            As above, continue current medications and maintain follow up with specialists.  Return to clinic in 6 months.      Frederick W Dantagnan [facility]

## 2024-02-27 DIAGNOSIS — Z00.00 ENCOUNTER FOR MEDICARE ANNUAL WELLNESS EXAM: ICD-10-CM

## 2024-03-21 DIAGNOSIS — I11.9 HYPERTENSIVE HEART DISEASE WITHOUT HEART FAILURE: ICD-10-CM

## 2024-03-21 RX ORDER — METOPROLOL SUCCINATE 25 MG/1
TABLET, EXTENDED RELEASE ORAL
Qty: 90 TABLET | Refills: 0 | Status: SHIPPED | OUTPATIENT
Start: 2024-03-21

## 2024-04-03 DIAGNOSIS — E78.5 HYPERLIPIDEMIA, UNSPECIFIED HYPERLIPIDEMIA TYPE: ICD-10-CM

## 2024-04-03 RX ORDER — ROSUVASTATIN CALCIUM 40 MG/1
40 TABLET, COATED ORAL DAILY
Qty: 90 TABLET | Refills: 3 | Status: SHIPPED | OUTPATIENT
Start: 2024-04-03

## 2024-06-26 DIAGNOSIS — Z78.0 MENOPAUSE: ICD-10-CM

## 2024-06-29 DIAGNOSIS — I11.9 HYPERTENSIVE HEART DISEASE WITHOUT HEART FAILURE: ICD-10-CM

## 2024-07-01 RX ORDER — METOPROLOL SUCCINATE 25 MG/1
TABLET, EXTENDED RELEASE ORAL
Qty: 90 TABLET | Refills: 3 | Status: SHIPPED | OUTPATIENT
Start: 2024-07-01

## 2024-07-12 ENCOUNTER — TELEPHONE (OUTPATIENT)
Dept: CARDIOLOGY | Facility: CLINIC | Age: 69
End: 2024-07-12
Payer: MEDICARE

## 2024-07-12 NOTE — TELEPHONE ENCOUNTER
Spoke with patient explained that Dr. Wheeler will adress this upon his return to clinic on Monday. ESTEPHANIA       ----- Message from Shavon Weeks sent at 7/12/2024 11:34 AM CDT -----  Regarding: Labs  Pt [phone] would like for the doctor to order labs and and send them to [facility] by Monday because her and her  both will see their Pcp on Tuesday. Her  information is listed below.    Mariano Caro [id number]    Thanks

## 2024-07-15 ENCOUNTER — TELEPHONE (OUTPATIENT)
Dept: CARDIOLOGY | Facility: CLINIC | Age: 69
End: 2024-07-15
Payer: MEDICARE

## 2024-07-15 DIAGNOSIS — E66.01 MORBID OBESITY WITH BODY MASS INDEX (BMI) OF 45.0 TO 49.9 IN ADULT: ICD-10-CM

## 2024-07-15 DIAGNOSIS — E78.5 HYPERLIPIDEMIA, UNSPECIFIED HYPERLIPIDEMIA TYPE: Primary | ICD-10-CM

## 2024-07-15 DIAGNOSIS — I25.10 ASCVD (ARTERIOSCLEROTIC CARDIOVASCULAR DISEASE): ICD-10-CM

## 2024-07-15 NOTE — TELEPHONE ENCOUNTER
Labs ordered.    ----- Message from Yolis Allen MA sent at 7/10/2024  1:36 PM CDT -----  Contact: self  Please review.  ----- Message -----  From: Nellie Cotter MA  Sent: 7/10/2024  11:48 AM CDT  To: Liam Roa Staff    She has an appt with  on 8/9. Requesting to get labs before,please orders to [facility] and also needs to have an echo,if needed,there are orders in but it is in the wrong place,you need them to be in active request.Last echo was in Oct.2023. Please call [phone].Will send message on  also

## 2024-07-16 ENCOUNTER — OFFICE VISIT (OUTPATIENT)
Dept: PRIMARY CARE CLINIC | Facility: CLINIC | Age: 69
End: 2024-07-16
Payer: MEDICARE

## 2024-07-16 VITALS
HEART RATE: 78 BPM | RESPIRATION RATE: 18 BRPM | HEIGHT: 61 IN | OXYGEN SATURATION: 97 % | BODY MASS INDEX: 50.07 KG/M2 | TEMPERATURE: 97 F | WEIGHT: 265.19 LBS | SYSTOLIC BLOOD PRESSURE: 138 MMHG | DIASTOLIC BLOOD PRESSURE: 82 MMHG

## 2024-07-16 DIAGNOSIS — E78.2 MIXED HYPERLIPIDEMIA: ICD-10-CM

## 2024-07-16 DIAGNOSIS — K51.919 ULCERATIVE COLITIS WITH COMPLICATION, UNSPECIFIED LOCATION: Chronic | ICD-10-CM

## 2024-07-16 DIAGNOSIS — E66.01 MORBID OBESITY WITH BODY MASS INDEX (BMI) OF 45.0 TO 49.9 IN ADULT: ICD-10-CM

## 2024-07-16 DIAGNOSIS — I25.10 ASCVD (ARTERIOSCLEROTIC CARDIOVASCULAR DISEASE): Primary | ICD-10-CM

## 2024-07-16 DIAGNOSIS — I11.9 HYPERTENSIVE HEART DISEASE WITHOUT HEART FAILURE: ICD-10-CM

## 2024-07-16 PROCEDURE — 99214 OFFICE O/P EST MOD 30 MIN: CPT | Mod: S$PBB,,, | Performed by: INTERNAL MEDICINE

## 2024-07-16 PROCEDURE — 99214 OFFICE O/P EST MOD 30 MIN: CPT | Mod: PBBFAC | Performed by: INTERNAL MEDICINE

## 2024-07-16 PROCEDURE — 99999 PR PBB SHADOW E&M-EST. PATIENT-LVL IV: CPT | Mod: PBBFAC,,, | Performed by: INTERNAL MEDICINE

## 2024-07-16 NOTE — PROGRESS NOTES
[facility] Note    Chief Complaint      Chief Complaint   Patient presents with    Follow-up     6m       History of Present Illness      Jenny Caro is a 68 y.o. female who presents today for   Chief Complaint   Patient presents with    Follow-up     6m   .  Patient comes to appointment here for 6m checkup for chronic issues as below . She is currently feeling welll is frustrated with weight gain . She is limited with exercise . Will be seeing dr whitfield this month     Problem List Items Addressed This Visit          Cardiac/Vascular    Hyperlipidemia    Overview     Stable on current crestor 10 mg  cont current labs look great          ASCVD (arteriosclerotic cardiovascular disease) - Primary    Overview     Dr nguyễn managing is now on rosuvastatin  , she is also on asa , and metoprolol and crestor for secondary prevention . Will be seeing cards next month         Hypertensive heart disease without heart failure    Overview     Bp looks great cont current            Endocrine    Morbid obesity with body mass index (BMI) of 45.0 to 49.9 in adult    Overview     counciled again on diet and exercise will discuss with gi possibility of semaglutide             GI    Ulcerative colitis (Chronic)    Overview     Cont lialda . Gi managing dr whitfield . She is asymptomatic              Past Medical History:  Past Medical History:   Diagnosis Date    Abnormal Pap smear of cervix 2009    LEEP 2009, mild dysplasia normal paps since    Arthritis     ankles  born with club feet    GERD (gastroesophageal reflux disease)     HPV in female     Hyperlipidemia     Hypertension     Ulcerative colitis        Past Surgical History:  Past Surgical History:   Procedure Laterality Date    CERVICAL BIOPSY  W/ LOOP ELECTRODE EXCISION  2009    COLONOSCOPY  2013    approx    CYSTOSCOPY N/A 03/13/2019    Procedure: CYSTOSCOPY;  Surgeon: Rui Lozano DO;  Location: [facility];  Service: OB/GYN;  Laterality:  N/A;    DILATION AND CURETTAGE OF UTERUS  11/20/2018    Procedure: DILATION AND CURETTAGE, UTERUS;  Surgeon: Arvind Russell MD;  Location: [facility] OR;  Service: OB/GYN;;    EYE SURGERY  2016    HERNIA REPAIR  2007    HYSTERECTOMY  03/13/2019    Robotic DVH/ BSO     HYSTEROSCOPY N/A 11/20/2018    Procedure: HYSTEROSCOPY; TRUCLEAR RESECTION OF UTERINE POLYP;  Surgeon: Arvind Russell MD;  Location: [facility] OR;  Service: OB/GYN;  Laterality: N/A;    LAPAROSCOPIC GASTRIC BANDING  2006    POSTERIOR REPAIR N/A 03/13/2019    Procedure: COLPORRHAPHY, POSTERIOR;  Surgeon: Rui Lozano DO;  Location: [facility] OR;  Service: OB/GYN;  Laterality: N/A;    ROBOT-ASSISTED LAPAROSCOPIC ABDOMINAL SACROCOLPOPEXY N/A 03/13/2019    Procedure: ROBOTIC SACROCOLPOPEXY, ABDOMEN;  Surgeon: Rui Lozano DO;  Location: [facility] OR;  Service: OB/GYN;  Laterality: N/A;    ROBOT-ASSISTED LAPAROSCOPIC HYSTERECTOMY N/A 03/13/2019    Procedure: ROBOTIC HYSTERECTOMY;  Surgeon: Arvind Russell MD;  Location: [facility] OR;  Service: OB/GYN;  Laterality: N/A;    ROBOT-ASSISTED LAPAROSCOPIC SALPINGO-OOPHORECTOMY Bilateral 03/13/2019    Procedure: ROBOTIC SALPINGO-OOPHORECTOMY;  Surgeon: Arvind Russell MD;  Location: [facility] OR;  Service: OB/GYN;  Laterality: Bilateral;    SLEEVE GASTROPLASTY  08/2015       Family History:  family history includes Diabetes in her mother and sister; Heart disease in her father; Stroke in her sister.    Social History:  Social History     Socioeconomic History    Marital status:    Occupational History    Occupation: retired   Tobacco Use    Smoking status: Never    Smokeless tobacco: Never   Substance and Sexual Activity    Alcohol use: No    Drug use: No    Sexual activity: Yes     Partners: Male     Birth control/protection: Post-menopausal     Comment: :     DVH/BSO   3-13-19       Review of Systems:   Review of Systems   Constitutional:  Negative for fever and weight loss.   HENT:  Negative for congestion,  hearing loss and sore throat.    Eyes:  Negative for blurred vision.   Respiratory:  Negative for cough and shortness of breath.    Cardiovascular:  Negative for chest pain, palpitations, claudication and leg swelling.   Gastrointestinal:  Negative for abdominal pain, constipation, diarrhea and heartburn.   Genitourinary:  Negative for dysuria.   Musculoskeletal:  Negative for back pain and myalgias.   Skin:  Negative for rash.   Neurological:  Negative for focal weakness and headaches.   Psychiatric/Behavioral:  Negative for depression and suicidal ideas. The patient is not nervous/anxious.          Medications:  Outpatient Encounter Medications as of 7/16/2024   Medication Sig Note Dispense Refill    antiox #8/om3/dha/epa/lut/zeax (PRESERVISION AREDS 2, OMEGA-3, ORAL) Take 1 tablet by mouth once daily.       ascorbic acid, vitamin C, (VITAMIN C) 100 MG tablet Take 100 mg by mouth once daily.       aspirin (ECOTRIN) 81 MG EC tablet Take 81 mg by mouth once daily.       biotin 1 mg Cap Take by mouth.       calcium citrate-vitamin D3 200 mg calcium -250 unit Tab Take by mouth.       cetirizine (ZYRTEC) 10 MG tablet Take 10 mg by mouth once daily.       fexofenadine (ALLEGRA) 180 MG tablet Take 180 mg by mouth once daily.       ibuprofen (ADVIL,MOTRIN) 200 MG tablet Take 200 mg by mouth every 6 (six) hours as needed for Pain.       krill oil 500 mg Cap Take by mouth once daily.       LIALDA 1.2 gram TbEC 2.4 g daily with breakfast.  10/4/2017: Received from: External Pharmacy      lisinopriL (PRINIVIL,ZESTRIL) 20 MG tablet Take 1 tablet (20 mg total) by mouth once daily.  90 tablet 3    metoprolol succinate (TOPROL-XL) 25 MG 24 hr tablet 1 tab po daily  90 tablet 3    omeprazole (PRILOSEC) 20 MG capsule Take 1 capsule (20 mg total) by mouth once daily.  90 capsule 3    pulse oximeter (PULSE OXIMETER) device by Apply Externally route 2 (two) times a day. Use twice daily at 8 AM and 3 PM and record the value in [employer]  as directed.  1 each 0    rosuvastatin (CRESTOR) 40 MG Tab Take 1 tablet (40 mg total) by mouth once daily.  90 tablet 3    simethicone (GAS-X ORAL) Take by mouth once daily.        melatonin (MELATIN ORAL) Take by mouth.       [DISCONTINUED] metoprolol succinate (TOPROL-XL) 25 MG 24 hr tablet TAKE 1 TABLET BY MOUTH NIGHTLY AS NEEDED FOR THE HEART  90 tablet 0    [DISCONTINUED] nitrofurantoin, macrocrystal-monohydrate, (MACROBID) 100 MG capsule Take 1 capsule (100 mg total) by mouth 2 (two) times daily. (Patient not taking: Reported on 7/16/2024)  14 capsule 0     No facility-administered encounter medications on file as of 7/16/2024.        Allergies:  Review of patient's allergies indicates:   Allergen Reactions    Bactrim [sulfamethoxazole-trimethoprim] Itching         Physical Exam         Vitals:    07/16/24 1441   BP: 138/82   Pulse: 78   Resp: 18   Temp: 97 °F (36.1 °C)         Physical Exam  Constitutional:       Appearance: She is well-developed.   Eyes:      Pupils: Pupils are equal, round, and reactive to light.   Neck:      Thyroid: No thyromegaly.   Cardiovascular:      Rate and Rhythm: Normal rate.      Heart sounds: Normal heart sounds. No murmur heard.     No friction rub. No gallop.   Pulmonary:      Breath sounds: Normal breath sounds.   Abdominal:      General: Bowel sounds are normal.      Palpations: Abdomen is soft.   Musculoskeletal:         General: Normal range of motion.      Cervical back: Normal range of motion.   Lymphadenopathy:      Cervical: No cervical adenopathy.   Skin:     General: Skin is warm.      Findings: No rash.   Neurological:      Mental Status: She is alert and oriented to person, place, and time.      Cranial Nerves: No cranial nerve deficit.   Psychiatric:         Behavior: Behavior normal.          Laboratory:  CBC:  Recent Labs   Lab Result Units 07/16/24  0803   WBC K/uL 5.63   RBC M/uL 4.49   Hemoglobin g/dL 14.1   Hematocrit % 43.7   Platelets K/uL 220   MCV fL 97  "  MCH pg 31.4*   MCHC g/dL 32.3     CMP:  Recent Labs   Lab Result Units 07/16/24  0803   Glucose mg/dL 108   Calcium mg/dL 9.6   Sodium mmol/L 142   Potassium mmol/L 4.4   CO2 mmol/L 24   Chloride mmol/L 107   BUN mg/dL 17     URINALYSIS:  No results for input(s): "COLORU", "CLARITYU", "SPECGRAV", "PHUR", "PROTEINUA", "GLUCOSEU", "BILIRUBINCON", "BLOODU", "WBCU", "RBCU", "BACTERIA", "MUCUS", "NITRITE", "LEUKOCYTESUR", "UROBILINOGEN", "HYALINECASTS" in the last 2160 hours.   LIPIDS:  Recent Labs   Lab Result Units 07/16/24  0803   TSH uIU/mL 1.629   HDL mg/dL 49   Cholesterol mg/dL 161   Triglycerides mg/dL 171*   LDL Cholesterol mg/dL 77.8   HDL/Cholesterol Ratio % 30.4   Non-HDL Cholesterol mg/dL 112   Total Cholesterol/HDL Ratio  3.3     TSH:  Recent Labs   Lab Result Units 07/16/24  0803   TSH uIU/mL 1.629     A1C:  No results for input(s): "HGBA1C" in the last 2160 hours.    Radiology:        Assessment:     Jenny Caro is a 68 y.o.female with:    ASCVD (arteriosclerotic cardiovascular disease)    Morbid obesity with body mass index (BMI) of 45.0 to 49.9 in adult    Ulcerative colitis with complication, unspecified location    Hypertensive heart disease without heart failure    Mixed hyperlipidemia          Plan:     Problem List Items Addressed This Visit          Cardiac/Vascular    Hyperlipidemia    Overview     Stable on current crestor 10 mg  cont current labs look great          ASCVD (arteriosclerotic cardiovascular disease) - Primary    Overview     Dr nguyễn managing is now on rosuvastatin  , she is also on asa , and metoprolol and crestor for secondary prevention . Will be seeing cards next month         Hypertensive heart disease without heart failure    Overview     Bp looks great cont current            Endocrine    Morbid obesity with body mass index (BMI) of 45.0 to 49.9 in adult    Overview     counciled again on diet and exercise will discuss with gi possibility of semaglutide             GI "    Ulcerative colitis (Chronic)    Overview     Cont lialda . Gi managing dr whitfield . She is asymptomatic            As above, continue current medications and maintain follow up with specialists.  Return to clinic in 6 months.      Frederick W Dantagnan [facility]

## 2024-07-18 DIAGNOSIS — K21.9 GASTROESOPHAGEAL REFLUX DISEASE WITHOUT ESOPHAGITIS: Chronic | ICD-10-CM

## 2024-07-18 RX ORDER — OMEPRAZOLE 20 MG/1
20 CAPSULE, DELAYED RELEASE ORAL
Qty: 90 CAPSULE | Refills: 3 | Status: SHIPPED | OUTPATIENT
Start: 2024-07-18

## 2024-07-18 NOTE — TELEPHONE ENCOUNTER
Refill Decision Note   Jenny Vania  is requesting a refill authorization.  Brief Assessment and Rationale for Refill:  Approve     Medication Therapy Plan:         Comments:     Note composed:10:11 AM 07/18/2024

## 2024-07-18 NOTE — TELEPHONE ENCOUNTER
No care due was identified.  [facility] Embedded Care Due Messages. Reference number: [id number].   7/18/2024 8:48:20 AM CDT

## 2024-08-09 ENCOUNTER — OFFICE VISIT (OUTPATIENT)
Dept: CARDIOLOGY | Facility: CLINIC | Age: 69
End: 2024-08-09
Payer: MEDICARE

## 2024-08-09 VITALS
HEART RATE: 93 BPM | BODY MASS INDEX: 48.65 KG/M2 | WEIGHT: 257.5 LBS | SYSTOLIC BLOOD PRESSURE: 125 MMHG | DIASTOLIC BLOOD PRESSURE: 84 MMHG

## 2024-08-09 DIAGNOSIS — I25.10 ASCVD (ARTERIOSCLEROTIC CARDIOVASCULAR DISEASE): Primary | ICD-10-CM

## 2024-08-09 DIAGNOSIS — I11.9 HYPERTENSIVE HEART DISEASE WITHOUT HEART FAILURE: ICD-10-CM

## 2024-08-09 DIAGNOSIS — E78.2 MIXED HYPERLIPIDEMIA: ICD-10-CM

## 2024-08-09 DIAGNOSIS — E78.5 HYPERLIPIDEMIA, UNSPECIFIED HYPERLIPIDEMIA TYPE: ICD-10-CM

## 2024-08-09 DIAGNOSIS — I47.10 SVT (SUPRAVENTRICULAR TACHYCARDIA): ICD-10-CM

## 2024-08-09 PROCEDURE — 99999 PR PBB SHADOW E&M-EST. PATIENT-LVL III: CPT | Mod: PBBFAC,,, | Performed by: INTERNAL MEDICINE

## 2024-08-09 PROCEDURE — 99213 OFFICE O/P EST LOW 20 MIN: CPT | Mod: PBBFAC | Performed by: INTERNAL MEDICINE

## 2024-08-09 RX ORDER — EZETIMIBE 10 MG/1
10 TABLET ORAL DAILY
Qty: 90 TABLET | Refills: 3 | Status: SHIPPED | OUTPATIENT
Start: 2024-08-09

## 2024-08-09 RX ORDER — ROSUVASTATIN CALCIUM 40 MG/1
40 TABLET, COATED ORAL DAILY
Qty: 90 TABLET | Refills: 3 | Status: SHIPPED | OUTPATIENT
Start: 2024-08-09

## 2024-08-09 RX ORDER — METOPROLOL SUCCINATE 25 MG/1
TABLET, EXTENDED RELEASE ORAL
Qty: 90 TABLET | Refills: 3 | Status: SHIPPED | OUTPATIENT
Start: 2024-08-09

## 2025-01-07 DIAGNOSIS — K21.9 GASTROESOPHAGEAL REFLUX DISEASE WITHOUT ESOPHAGITIS: Chronic | ICD-10-CM

## 2025-01-07 RX ORDER — OMEPRAZOLE 20 MG/1
20 CAPSULE, DELAYED RELEASE ORAL DAILY
Qty: 90 CAPSULE | Refills: 3 | Status: SHIPPED | OUTPATIENT
Start: 2025-01-07

## 2025-01-07 NOTE — TELEPHONE ENCOUNTER
No care due was identified.  [facility] Embedded Care Due Messages. Reference number: [id number].   1/07/2025 12:40:02 PM CST

## 2025-01-07 NOTE — TELEPHONE ENCOUNTER
----- Message from Anastasiya sent at 1/7/2025 12:30 PM CST -----  Contact: [phone]  Prescription refill request.    RX name and strength (copy/paste from chart):   omeprazole (PRILOSEC) 20 MG capsule    Directions (copy/paste from chart):  Take 1 capsule by mouth once daily - Oral    Is this a 30 day or 90 day RX:  90    Local pharmacy or mail order pharmacy:  mail order    Pharmacy name and phone # (copy/paste from chart):       [facility] [employer] [place], [place] - [address] [facility]  [address] [facility] [address]  Phone: [phone] Fax: [phone]    Additional information:   This medication now has to go through mail order because she switched ins companies.  Please send to the above pharmacy.

## 2025-01-20 ENCOUNTER — TELEPHONE (OUTPATIENT)
Dept: PRIMARY CARE CLINIC | Facility: CLINIC | Age: 70
End: 2025-01-20
Payer: COMMERCIAL

## 2025-01-20 NOTE — TELEPHONE ENCOUNTER
----- Message from Med Assistant Autumn sent at 1/20/2025 11:53 AM CST -----  I attempted to reschedule patients appointment with  on 1/20/25. Patient is requesting back to back appointments with her (Mariano Caro MRN: [id number]) who was also scheduled to see . I did not have any appointments to offer prior to June 2025. She is requesting they been seen at an earlier date. Can someone contact patient to assist with scheduling these appointments?    Thanks  Autumn

## 2025-02-19 ENCOUNTER — OFFICE VISIT (OUTPATIENT)
Dept: PRIMARY CARE CLINIC | Facility: CLINIC | Age: 70
End: 2025-02-19
Payer: MEDICARE

## 2025-02-19 VITALS
OXYGEN SATURATION: 95 % | SYSTOLIC BLOOD PRESSURE: 128 MMHG | RESPIRATION RATE: 18 BRPM | BODY MASS INDEX: 44.17 KG/M2 | DIASTOLIC BLOOD PRESSURE: 84 MMHG | WEIGHT: 233.94 LBS | HEIGHT: 61 IN | HEART RATE: 63 BPM

## 2025-02-19 DIAGNOSIS — I47.10 SVT (SUPRAVENTRICULAR TACHYCARDIA): ICD-10-CM

## 2025-02-19 DIAGNOSIS — K51.919 ULCERATIVE COLITIS WITH COMPLICATION, UNSPECIFIED LOCATION: Primary | Chronic | ICD-10-CM

## 2025-02-19 DIAGNOSIS — E66.01 MORBID OBESITY: ICD-10-CM

## 2025-02-19 DIAGNOSIS — I25.10 ASCVD (ARTERIOSCLEROTIC CARDIOVASCULAR DISEASE): ICD-10-CM

## 2025-02-19 NOTE — PROGRESS NOTES
[facility] Note    Chief Complaint      Chief Complaint   Patient presents with    Follow-up     6m       History of Present Illness      Jenny Caro is a 69 y.o. female who presents today for   Chief Complaint   Patient presents with    Follow-up     6m   .  Patient comes to appointment here for 6m checkup for chronic issues as below ..she is currently feeling well is doing well wth weight loss is on semaglutide currently .     Problem List Items Addressed This Visit       Ulcerative colitis - Primary (Chronic)    Overview   Cont lialda . Gi managing dr whitfield . She is asymptomatic         ASCVD (arteriosclerotic cardiovascular disease)    Overview   Dr nguyễn managing is now on rosuvastatin  , she is also on asa , and metoprolol and crestor for secondary prevention .          SVT (supraventricular tachycardia)    Overview   Stablke          Morbid obesity    Overview   Doing well on glp 1 . Is down to 233 from 271              Past Medical History:  Past Medical History:   Diagnosis Date    Abnormal Pap smear of cervix 2009    LEEP 2009, mild dysplasia normal paps since    Arthritis     ankles  born with club feet    GERD (gastroesophageal reflux disease)     HPV in female     Hyperlipidemia     Hypertension     Ulcerative colitis        Past Surgical History:  Past Surgical History:   Procedure Laterality Date    CERVICAL BIOPSY  W/ LOOP ELECTRODE EXCISION  2009    COLONOSCOPY  2013    approx    CYSTOSCOPY N/A 03/13/2019    Procedure: CYSTOSCOPY;  Surgeon: Rui Lozano DO;  Location: [facility] OR;  Service: OB/GYN;  Laterality: N/A;    DILATION AND CURETTAGE OF UTERUS  11/20/2018    Procedure: DILATION AND CURETTAGE, UTERUS;  Surgeon: Arvind Russell MD;  Location: [facility] OR;  Service: OB/GYN;;    EYE SURGERY  2016    HERNIA REPAIR  2007    HYSTERECTOMY  03/13/2019    Robotic DVH/ BSO     HYSTEROSCOPY N/A 11/20/2018    Procedure: HYSTEROSCOPY; TRUCLEAR RESECTION OF UTERINE POLYP;   Surgeon: Arvind Russell MD;  Location: [facility] OR;  Service: OB/GYN;  Laterality: N/A;    LAPAROSCOPIC GASTRIC BANDING  2006    POSTERIOR REPAIR N/A 03/13/2019    Procedure: COLPORRHAPHY, POSTERIOR;  Surgeon: Rui Lozano DO;  Location: [facility] OR;  Service: OB/GYN;  Laterality: N/A;    ROBOT-ASSISTED LAPAROSCOPIC ABDOMINAL SACROCOLPOPEXY N/A 03/13/2019    Procedure: ROBOTIC SACROCOLPOPEXY, ABDOMEN;  Surgeon: Rui Lozano DO;  Location: [facility] OR;  Service: OB/GYN;  Laterality: N/A;    ROBOT-ASSISTED LAPAROSCOPIC HYSTERECTOMY N/A 03/13/2019    Procedure: ROBOTIC HYSTERECTOMY;  Surgeon: Arvind Russell MD;  Location: [facility] OR;  Service: OB/GYN;  Laterality: N/A;    ROBOT-ASSISTED LAPAROSCOPIC SALPINGO-OOPHORECTOMY Bilateral 03/13/2019    Procedure: ROBOTIC SALPINGO-OOPHORECTOMY;  Surgeon: Arvind Russell MD;  Location: [facility];  Service: OB/GYN;  Laterality: Bilateral;    SLEEVE GASTROPLASTY  08/2015       Family History:  family history includes Diabetes in her mother and sister; Heart disease in her father; Stroke in her sister.    Social History:  Social History[1]    Review of Systems:   Review of Systems   Constitutional:  Negative for fever and weight loss.   HENT:  Negative for congestion, hearing loss and sore throat.    Eyes:  Negative for blurred vision.   Respiratory:  Negative for cough and shortness of breath.    Cardiovascular:  Negative for chest pain, palpitations, claudication and leg swelling.   Gastrointestinal:  Negative for abdominal pain, constipation, diarrhea, heartburn, nausea and vomiting.   Genitourinary:  Negative for dysuria.   Musculoskeletal:  Negative for back pain and myalgias.   Skin:  Negative for rash.   Neurological:  Negative for focal weakness and headaches.   Psychiatric/Behavioral:  Negative for depression, memory loss and suicidal ideas. The patient is not nervous/anxious.          Medications:  Encounter Medications[2]     Allergies:  Review of patient's allergies  "indicates:   Allergen Reactions    Bactrim [sulfamethoxazole-trimethoprim] Itching         Physical Exam         Vitals:    02/19/25 0925   BP: 128/84   Pulse: 63   Resp: 18         Physical Exam  Constitutional:       Appearance: She is well-developed.   Eyes:      Pupils: Pupils are equal, round, and reactive to light.   Neck:      Thyroid: No thyromegaly.   Cardiovascular:      Rate and Rhythm: Normal rate.      Heart sounds: Normal heart sounds. No murmur heard.     No friction rub. No gallop.   Pulmonary:      Breath sounds: Normal breath sounds.   Abdominal:      General: Bowel sounds are normal.      Palpations: Abdomen is soft.   Musculoskeletal:         General: Normal range of motion.      Cervical back: Normal range of motion.   Lymphadenopathy:      Cervical: No cervical adenopathy.   Skin:     General: Skin is warm.      Findings: No rash.   Neurological:      Mental Status: She is alert and oriented to person, place, and time.      Cranial Nerves: No cranial nerve deficit.   Psychiatric:         Behavior: Behavior normal.          Laboratory:  CBC:  No results for input(s): "WBC", "RBC", "HGB", "HCT", "PLT", "MCV", "MCH", "MCHC" in the last 2160 hours.  CMP:  No results for input(s): "GLU", "CALCIUM", "ALBUMIN", "PROT", "NA", "K", "CO2", "CL", "BUN", "ALKPHOS", "ALT", "AST", "BILITOT" in the last 2160 hours.    Invalid input(s): "CREATININ"  URINALYSIS:  No results for input(s): "COLORU", "CLARITYU", "SPECGRAV", "PHUR", "PROTEINUA", "GLUCOSEU", "BILIRUBINCON", "BLOODU", "WBCU", "RBCU", "BACTERIA", "MUCUS", "NITRITE", "LEUKOCYTESUR", "UROBILINOGEN", "HYALINECASTS" in the last 2160 hours.   LIPIDS:  No results for input(s): "TSH", "HDL", "CHOL", "TRIG", "LDLCALC", "CHOLHDL", "NONHDLCHOL", "TOTALCHOLEST" in the last 2160 hours.  TSH:  No results for input(s): "TSH" in the last 2160 hours.  A1C:  No results for input(s): "HGBA1C" in the last 2160 hours.    Radiology:        Assessment:     Jenny Caro " is a 69 y.o.female with:    Ulcerative colitis with complication, unspecified location    SVT (supraventricular tachycardia)    ASCVD (arteriosclerotic cardiovascular disease)    Morbid obesity          Plan:     Problem List Items Addressed This Visit       Ulcerative colitis - Primary (Chronic)    Overview   Kaiden andres . Gi managing dr whitfield . She is asymptomatic         ASCVD (arteriosclerotic cardiovascular disease)    Overview   Dr nguyễn managing is now on rosuvastatin  , she is also on asa , and metoprolol and crestor for secondary prevention .          SVT (supraventricular tachycardia)    Overview   Stablke          Morbid obesity    Overview   Doing well on glp 1 . Is down to 233 from 271            As above, continue current medications and maintain follow up with specialists.  Return to clinic in 6 months.      Eliceo Beatty  [facility]                       [1]   Social History  Socioeconomic History    Marital status:    Occupational History    Occupation: retired   Tobacco Use    Smoking status: Never    Smokeless tobacco: Never   Substance and Sexual Activity    Alcohol use: No    Drug use: No    Sexual activity: Yes     Partners: Male     Birth control/protection: Post-menopausal     Comment: :     DVH/BSO   3-13-19   [2]   Outpatient Encounter Medications as of 2/19/2025   Medication Sig Note Dispense Refill    antiox #8/om3/dha/epa/lut/zeax (PRESERVISION AREDS 2, OMEGA-3, ORAL) Take 1 tablet by mouth once daily.       ascorbic acid, vitamin C, (VITAMIN C) 100 MG tablet Take 100 mg by mouth once daily.       biotin 1 mg Cap Take by mouth.       calcium citrate-vitamin D3 200 mg calcium -250 unit Tab Take by mouth.       cetirizine (ZYRTEC) 10 MG tablet Take 10 mg by mouth once daily.       ezetimibe (ZETIA) 10 mg tablet Take 1 tablet (10 mg total) by mouth once daily.  90 tablet 3    fexofenadine (ALLEGRA) 180 MG tablet Take 180 mg by  mouth once daily.       ibuprofen (ADVIL,MOTRIN) 200 MG tablet Take 200 mg by mouth every 6 (six) hours as needed for Pain.       LIALDA 1.2 gram TbEC 2.4 g daily with breakfast.  10/4/2017: Received from: External Pharmacy      lisinopriL (PRINIVIL,ZESTRIL) 20 MG tablet Take 1 tablet (20 mg total) by mouth once daily.  90 tablet 3    melatonin (MELATIN ORAL) Take by mouth.       metoprolol succinate (TOPROL-XL) 25 MG 24 hr tablet 1 tab po daily  90 tablet 3    pulse oximeter (PULSE OXIMETER) device by Apply Externally route 2 (two) times a day. Use twice daily at 8 AM and 3 PM and record the value in [employer] as directed.  1 each 0    rosuvastatin (CRESTOR) 40 MG Tab Take 1 tablet (40 mg total) by mouth once daily.  90 tablet 3    simethicone (GAS-X ORAL) Take by mouth once daily.        [DISCONTINUED] krill oil 500 mg Cap Take by mouth once daily.       [DISCONTINUED] omeprazole (PRILOSEC) 20 MG capsule Take 1 capsule (20 mg total) by mouth once daily.  90 capsule 3     No facility-administered encounter medications on file as of 2/19/2025.

## 2025-02-20 ENCOUNTER — TELEPHONE (OUTPATIENT)
Dept: PRIMARY CARE CLINIC | Facility: CLINIC | Age: 70
End: 2025-02-20
Payer: MEDICARE

## 2025-02-20 ENCOUNTER — TELEPHONE (OUTPATIENT)
Dept: CARDIOLOGY | Facility: CLINIC | Age: 70
End: 2025-02-20
Payer: MEDICARE

## 2025-02-20 NOTE — TELEPHONE ENCOUNTER
----- Message from Corazon sent at 2/20/2025 10:27 AM CST -----  Regarding: Appt  Patient calling to schedule an appointment for her and  for the same day. Please call back @ [phone]. Thank you Corazon

## 2025-02-20 NOTE — TELEPHONE ENCOUNTER
----- Message from Marquis sent at 2/20/2025 10:43 AM CST -----  Regarding: Change of Pharmacy  Contact: Pt [phone] .1MEDICALADVICE Patient is calling for Medical Advice regarding: Patient changed pharmacies. They would like their medication to be sent to [facility] from now on.How long has patient had these symptoms:Pharmacy name and phone#:Patient wants a call back or [facility]:Comments:Please advise patient replies from provider may take up to 48 hours.

## 2025-03-10 DIAGNOSIS — I11.9 HYPERTENSIVE HEART DISEASE WITHOUT HEART FAILURE: ICD-10-CM

## 2025-03-10 RX ORDER — LISINOPRIL 20 MG/1
20 TABLET ORAL DAILY
Qty: 90 TABLET | Refills: 3 | Status: SHIPPED | OUTPATIENT
Start: 2025-03-10 | End: 2026-03-10

## 2025-03-10 NOTE — TELEPHONE ENCOUNTER
----- Message from Tawny sent at 3/10/2025  4:35 PM CDT -----  Contact: [phone]  Requesting an RX refill or new RX.Is this a refill or new RX: [facility] RX name and strength lisinopriL (PRINIVIL,ZESTRIL) 20 MG tablet:  Is this a 30 day or 90 day RX: 90Pharmacy name and phone # [facility] [address] [place] [id number]: [phone] Fax: [phone]

## 2025-03-10 NOTE — TELEPHONE ENCOUNTER
No care due was identified.  [facility] Embedded Care Due Messages. Reference number: [id number].   3/10/2025 4:41:13 PM CDT   Post-Care Instructions: I reviewed with the patient in detail post-care instructions. Patient is not to engage in any strenuous activity for at least 48 hours, and is to avoid heavy lifting, strenuous exercise, or swimming for the next 14 days. Should the patient develop any fevers, chills, bleeding, or severe pain, the patient will contact the office immediately.

## 2025-03-24 DIAGNOSIS — Z00.00 ENCOUNTER FOR MEDICARE ANNUAL WELLNESS EXAM: ICD-10-CM

## 2025-05-14 RX ORDER — EZETIMIBE 10 MG/1
10 TABLET ORAL
Qty: 90 TABLET | Refills: 3 | Status: SHIPPED | OUTPATIENT
Start: 2025-05-14

## 2025-07-02 DIAGNOSIS — Z78.0 MENOPAUSE: ICD-10-CM

## 2025-07-09 ENCOUNTER — OFFICE VISIT (OUTPATIENT)
Dept: CARDIOLOGY | Facility: CLINIC | Age: 70
End: 2025-07-09
Payer: MEDICARE

## 2025-07-09 ENCOUNTER — LAB VISIT (OUTPATIENT)
Dept: LAB | Facility: HOSPITAL | Age: 70
End: 2025-07-09
Attending: INTERNAL MEDICINE
Payer: MEDICARE

## 2025-07-09 VITALS
DIASTOLIC BLOOD PRESSURE: 83 MMHG | BODY MASS INDEX: 42.95 KG/M2 | HEIGHT: 61 IN | HEART RATE: 72 BPM | SYSTOLIC BLOOD PRESSURE: 124 MMHG | WEIGHT: 227.5 LBS

## 2025-07-09 DIAGNOSIS — R93.89 ABNORMAL FINDINGS ON DIAGNOSTIC IMAGING OF OTHER SPECIFIED BODY STRUCTURES: ICD-10-CM

## 2025-07-09 DIAGNOSIS — I25.10 ASCVD (ARTERIOSCLEROTIC CARDIOVASCULAR DISEASE): ICD-10-CM

## 2025-07-09 DIAGNOSIS — I47.10 SVT (SUPRAVENTRICULAR TACHYCARDIA): ICD-10-CM

## 2025-07-09 DIAGNOSIS — E78.5 HYPERLIPIDEMIA, UNSPECIFIED HYPERLIPIDEMIA TYPE: ICD-10-CM

## 2025-07-09 DIAGNOSIS — R79.9 ABNORMAL FINDING OF BLOOD CHEMISTRY, UNSPECIFIED: ICD-10-CM

## 2025-07-09 DIAGNOSIS — I11.9 HYPERTENSIVE HEART DISEASE WITHOUT HEART FAILURE: ICD-10-CM

## 2025-07-09 DIAGNOSIS — E66.01 MORBID OBESITY WITH BODY MASS INDEX (BMI) OF 45.0 TO 49.9 IN ADULT: ICD-10-CM

## 2025-07-09 DIAGNOSIS — I25.10 ASCVD (ARTERIOSCLEROTIC CARDIOVASCULAR DISEASE): Primary | ICD-10-CM

## 2025-07-09 LAB
ALBUMIN SERPL BCP-MCNC: 4.1 G/DL (ref 3.5–5.2)
ALP SERPL-CCNC: 66 UNIT/L (ref 40–150)
ALT SERPL W/O P-5'-P-CCNC: 22 UNIT/L (ref 10–44)
ANION GAP (OHS): 8 MMOL/L (ref 8–16)
AST SERPL-CCNC: 23 UNIT/L (ref 11–45)
BILIRUB SERPL-MCNC: 0.3 MG/DL (ref 0.1–1)
BUN SERPL-MCNC: 20 MG/DL (ref 8–23)
CALCIUM SERPL-MCNC: 9.9 MG/DL (ref 8.7–10.5)
CHLORIDE SERPL-SCNC: 105 MMOL/L (ref 95–110)
CHOLEST SERPL-MCNC: 127 MG/DL (ref 120–199)
CHOLEST/HDLC SERPL: 2.6 {RATIO} (ref 2–5)
CO2 SERPL-SCNC: 28 MMOL/L (ref 23–29)
CREAT SERPL-MCNC: 0.7 MG/DL (ref 0.5–1.4)
EAG (OHS): 103 MG/DL (ref 68–131)
ERYTHROCYTE [DISTWIDTH] IN BLOOD BY AUTOMATED COUNT: 13.9 % (ref 11.5–14.5)
GFR SERPLBLD CREATININE-BSD FMLA CKD-EPI: >60 ML/MIN/1.73/M2
GLUCOSE SERPL-MCNC: 83 MG/DL (ref 70–110)
HBA1C MFR BLD: 5.2 % (ref 4–5.6)
HCT VFR BLD AUTO: 43.4 % (ref 37–48.5)
HDLC SERPL-MCNC: 49 MG/DL (ref 40–75)
HDLC SERPL: 38.6 % (ref 20–50)
HGB BLD-MCNC: 13.9 GM/DL (ref 12–16)
LDLC SERPL CALC-MCNC: 59.6 MG/DL (ref 63–159)
MCH RBC QN AUTO: 31.3 PG (ref 27–31)
MCHC RBC AUTO-ENTMCNC: 32 G/DL (ref 32–36)
MCV RBC AUTO: 98 FL (ref 82–98)
NONHDLC SERPL-MCNC: 78 MG/DL
PLATELET # BLD AUTO: 208 K/UL (ref 150–450)
PMV BLD AUTO: 10.8 FL (ref 9.2–12.9)
POTASSIUM SERPL-SCNC: 4.7 MMOL/L (ref 3.5–5.1)
PROT SERPL-MCNC: 7.3 GM/DL (ref 6–8.4)
RBC # BLD AUTO: 4.44 M/UL (ref 4–5.4)
SODIUM SERPL-SCNC: 141 MMOL/L (ref 136–145)
TRIGL SERPL-MCNC: 92 MG/DL (ref 30–150)
TSH SERPL-ACNC: 1.16 UIU/ML (ref 0.4–4)
WBC # BLD AUTO: 7.46 K/UL (ref 3.9–12.7)

## 2025-07-09 PROCEDURE — 83036 HEMOGLOBIN GLYCOSYLATED A1C: CPT

## 2025-07-09 PROCEDURE — 36415 COLL VENOUS BLD VENIPUNCTURE: CPT

## 2025-07-09 PROCEDURE — 99999 PR PBB SHADOW E&M-EST. PATIENT-LVL III: CPT | Mod: PBBFAC,,, | Performed by: INTERNAL MEDICINE

## 2025-07-09 PROCEDURE — 82465 ASSAY BLD/SERUM CHOLESTEROL: CPT

## 2025-07-09 PROCEDURE — 84075 ASSAY ALKALINE PHOSPHATASE: CPT

## 2025-07-09 PROCEDURE — 84443 ASSAY THYROID STIM HORMONE: CPT

## 2025-07-09 PROCEDURE — 85027 COMPLETE CBC AUTOMATED: CPT

## 2025-07-09 RX ORDER — EZETIMIBE 10 MG/1
10 TABLET ORAL DAILY
Qty: 90 TABLET | Refills: 3 | Status: SHIPPED | OUTPATIENT
Start: 2025-07-09

## 2025-07-09 RX ORDER — ROSUVASTATIN CALCIUM 40 MG/1
40 TABLET, COATED ORAL DAILY
Qty: 90 TABLET | Refills: 3 | Status: SHIPPED | OUTPATIENT
Start: 2025-07-09

## 2025-07-09 RX ORDER — METOPROLOL SUCCINATE 25 MG/1
TABLET, EXTENDED RELEASE ORAL
Qty: 90 TABLET | Refills: 3 | Status: SHIPPED | OUTPATIENT
Start: 2025-07-09

## 2025-07-09 NOTE — PROGRESS NOTES
HISTORY:    70 yo F w a h/o SVT, nonobs CAD, hypertension, hyperlipidemia, obesity, UC, and OA presenting for follow-up.    The patient denies any symptoms of chest pain. Denies SOB or JHAVERI at this time.     Activity levels are mild to moderate by choice. Completes ADLs. Limited by fatigue and ankle pain 2/2 OA.    The patient denies any previous history of myocardial infarction, stroke, congestive heart failure, or cardiomyopathy.    H/o SVT that has not been an issue recently.     Currently tolerates metoprolol 25x1, lisinopril 20x1, rosuvastatin 40x1, ezetimibe 10x1, semaglutide.     Her  is my patient and she is a family friend of the Bisi.    PHYSICAL EXAM:    Vitals:    07/09/25 1009   BP: 124/83   Pulse: 72     NAD, A+Ox3.  No jvd, no bruit.  RRR nml s1,s2. No murmurs.  CTA B no wheezes or crackles.  No edema.    LABS/STUDIES (imaging reviewed during clinic visit):    July 2024 CBC and CMP normal.  /HDL 49/LDL 77/.  Lipoprotein a 237.  APO B 78.  TSH normal.    ECG July 2025 NSR no Qs/Sts. NSIVCD.  TTE October 2023 normal LV size and function with EF 60 65%.  CVP 3.    OSH records at   Coronary CTA 2021 Calcium score 260 Nonobs CAD.      ASSESSMENT & PLAN:    1. ASCVD (arteriosclerotic cardiovascular disease)    2. Hypertensive heart disease without heart failure    3. Morbid obesity with body mass index (BMI) of 45.0 to 49.9 in adult    4. Abnormal finding of blood chemistry, unspecified    5. Abnormal findings on diagnostic imaging of other specified body structures    6. Hyperlipidemia, unspecified hyperlipidemia type    7. SVT (supraventricular tachycardia)        Orders Placed This Encounter    Lipid Panel    CBC Without Differential    Comprehensive Metabolic Panel    TSH    Hemoglobin A1C    CV Ultrasound Bilateral Doppler Carotid    rosuvastatin (CRESTOR) 40 MG Tab    ezetimibe (ZETIA) 10 mg tablet    metoprolol succinate (TOPROL-XL) 25 MG 24 hr tablet       Nonobs CAD w an  elevated calcium score '21. Non-specific fatigue. Not c/o JHAVERI today. Expectant management.    Tolerating rosuvastatin 40x1 and ezetimibe 10x1.  LDL 77 on statin alone. Follow-up labs. Elevated Lipo a.     Bps controlled at home on metoprolol/lisinopril.     H/o SVT without palpitations at this time. Afib in the chart and pt specifically states that she has never had afib or been on AC. Prior to 2022 was followed at OSH.     Pt understands the importance of weight loss and activity. She is trying to optimize these risk factors. Semaglutide with 40 pound weight loss. Will start working out more.     Follow up in about 1 year (around 7/9/2026).      Janina Wheeler MD

## 2025-07-10 LAB
OHS QRS DURATION: 112 MS
OHS QTC CALCULATION: 423 MS

## 2025-08-19 ENCOUNTER — PATIENT OUTREACH (OUTPATIENT)
Dept: ADMINISTRATIVE | Facility: HOSPITAL | Age: 70
End: 2025-08-19
Payer: MEDICARE

## 2025-08-20 ENCOUNTER — OFFICE VISIT (OUTPATIENT)
Dept: PRIMARY CARE CLINIC | Facility: CLINIC | Age: 70
End: 2025-08-20
Payer: MEDICARE

## 2025-08-20 VITALS
HEART RATE: 74 BPM | OXYGEN SATURATION: 96 % | DIASTOLIC BLOOD PRESSURE: 80 MMHG | WEIGHT: 230.63 LBS | BODY MASS INDEX: 43.54 KG/M2 | RESPIRATION RATE: 18 BRPM | HEIGHT: 61 IN | SYSTOLIC BLOOD PRESSURE: 122 MMHG

## 2025-08-20 DIAGNOSIS — Z00.00 ANNUAL PHYSICAL EXAM: Primary | ICD-10-CM

## 2025-08-20 DIAGNOSIS — K21.9 GASTROESOPHAGEAL REFLUX DISEASE WITHOUT ESOPHAGITIS: Chronic | ICD-10-CM

## 2025-08-20 DIAGNOSIS — E78.2 MIXED HYPERLIPIDEMIA: ICD-10-CM

## 2025-08-20 DIAGNOSIS — E66.01 MORBID OBESITY: ICD-10-CM

## 2025-08-20 DIAGNOSIS — I25.10 ASCVD (ARTERIOSCLEROTIC CARDIOVASCULAR DISEASE): ICD-10-CM

## 2025-08-20 DIAGNOSIS — K51.919 ULCERATIVE COLITIS WITH COMPLICATION, UNSPECIFIED LOCATION: Chronic | ICD-10-CM

## 2025-08-20 PROCEDURE — 1160F RVW MEDS BY RX/DR IN RCRD: CPT | Mod: CPTII,S$GLB,, | Performed by: INTERNAL MEDICINE

## 2025-08-20 PROCEDURE — 1101F PT FALLS ASSESS-DOCD LE1/YR: CPT | Mod: CPTII,S$GLB,, | Performed by: INTERNAL MEDICINE

## 2025-08-20 PROCEDURE — 3288F FALL RISK ASSESSMENT DOCD: CPT | Mod: CPTII,S$GLB,, | Performed by: INTERNAL MEDICINE

## 2025-08-20 PROCEDURE — 3008F BODY MASS INDEX DOCD: CPT | Mod: CPTII,S$GLB,, | Performed by: INTERNAL MEDICINE

## 2025-08-20 PROCEDURE — 1159F MED LIST DOCD IN RCRD: CPT | Mod: CPTII,S$GLB,, | Performed by: INTERNAL MEDICINE

## 2025-08-20 PROCEDURE — 99999 PR PBB SHADOW E&M-EST. PATIENT-LVL IV: CPT | Mod: PBBFAC,,, | Performed by: INTERNAL MEDICINE

## 2025-08-20 PROCEDURE — 1126F AMNT PAIN NOTED NONE PRSNT: CPT | Mod: CPTII,S$GLB,, | Performed by: INTERNAL MEDICINE

## 2025-08-20 PROCEDURE — 3074F SYST BP LT 130 MM HG: CPT | Mod: CPTII,S$GLB,, | Performed by: INTERNAL MEDICINE

## 2025-08-20 PROCEDURE — 99397 PER PM REEVAL EST PAT 65+ YR: CPT | Mod: S$GLB,,, | Performed by: INTERNAL MEDICINE

## 2025-08-20 PROCEDURE — 3079F DIAST BP 80-89 MM HG: CPT | Mod: CPTII,S$GLB,, | Performed by: INTERNAL MEDICINE

## 2025-08-20 PROCEDURE — 4010F ACE/ARB THERAPY RXD/TAKEN: CPT | Mod: CPTII,S$GLB,, | Performed by: INTERNAL MEDICINE

## 2025-08-20 PROCEDURE — 3044F HG A1C LEVEL LT 7.0%: CPT | Mod: CPTII,S$GLB,, | Performed by: INTERNAL MEDICINE

## 2025-08-21 ENCOUNTER — HOSPITAL ENCOUNTER (OUTPATIENT)
Dept: CARDIOLOGY | Facility: HOSPITAL | Age: 70
Discharge: HOME | End: 2025-08-21
Attending: INTERNAL MEDICINE
Payer: MEDICARE

## 2025-08-21 DIAGNOSIS — I25.10 ASCVD (ARTERIOSCLEROTIC CARDIOVASCULAR DISEASE): ICD-10-CM

## 2025-08-21 PROCEDURE — 93880 EXTRACRANIAL BILAT STUDY: CPT | Mod: 26,,, | Performed by: INTERNAL MEDICINE

## 2025-08-21 PROCEDURE — 93880 EXTRACRANIAL BILAT STUDY: CPT

## 2025-08-22 LAB
LEFT ARM DIASTOLIC BLOOD PRESSURE: 78 MMHG
LEFT ARM SYSTOLIC BLOOD PRESSURE: 128 MMHG
LEFT CBA DIAS: 23 CM/S
LEFT CBA SYS: 70 CM/S
LEFT CCA DIST DIAS: 26 CM/S
LEFT CCA DIST SYS: 75 CM/S
LEFT CCA MID DIAS: 30 CM/S
LEFT CCA MID SYS: 89 CM/S
LEFT CCA PROX DIAS: 17 CM/S
LEFT CCA PROX SYS: 67 CM/S
LEFT ECA DIAS: 6 CM/S
LEFT ECA SYS: 62 CM/S
LEFT ICA DIST DIAS: 21 CM/S
LEFT ICA DIST SYS: 74 CM/S
LEFT ICA MID DIAS: 31 CM/S
LEFT ICA MID SYS: 69 CM/S
LEFT ICA PROX DIAS: 25 CM/S
LEFT ICA PROX SYS: 78 CM/S
LEFT VERTEBRAL DIAS: 10 CM/S
LEFT VERTEBRAL SYS: 45 CM/S
OHS CV CAROTID RIGHT ICA EDV HIGHEST: 33
OHS CV CAROTID ULTRASOUND LEFT ICA/CCA RATIO: 1.04
OHS CV CAROTID ULTRASOUND RIGHT ICA/CCA RATIO: 1.17
OHS CV PV CAROTID LEFT HIGHEST CCA: 89
OHS CV PV CAROTID LEFT HIGHEST ICA: 78
OHS CV PV CAROTID RIGHT HIGHEST CCA: 90
OHS CV PV CAROTID RIGHT HIGHEST ICA: 105
OHS CV US CAROTID LEFT HIGHEST EDV: 31
RIGHT ARM DIASTOLIC BLOOD PRESSURE: 80 MMHG
RIGHT ARM SYSTOLIC BLOOD PRESSURE: 130 MMHG
RIGHT CBA DIAS: 23 CM/S
RIGHT CBA SYS: 71 CM/S
RIGHT CCA DIST DIAS: 28 CM/S
RIGHT CCA DIST SYS: 90 CM/S
RIGHT CCA MID DIAS: 24 CM/S
RIGHT CCA MID SYS: 79 CM/S
RIGHT CCA PROX DIAS: 15 CM/S
RIGHT CCA PROX SYS: 60 CM/S
RIGHT ECA DIAS: 7 CM/S
RIGHT ECA SYS: 69 CM/S
RIGHT ICA DIST DIAS: 33 CM/S
RIGHT ICA DIST SYS: 105 CM/S
RIGHT ICA MID DIAS: 24 CM/S
RIGHT ICA MID SYS: 80 CM/S
RIGHT ICA PROX DIAS: 13 CM/S
RIGHT ICA PROX SYS: 45 CM/S
RIGHT VERTEBRAL DIAS: 9 CM/S
RIGHT VERTEBRAL SYS: 42 CM/S

## (undated) DEVICE — APPLICATOR ENDOSCOPIC

## (undated) DEVICE — GLOVE BIOGEL SKINSENSE PI 6.5

## (undated) DEVICE — UNDERGLOVE BIOGEL PI SZ 6.5 LF

## (undated) DEVICE — DEVICE TRUCLEAR INCISOR PLUS

## (undated) DEVICE — JELLY SURGILUBE 5GR

## (undated) DEVICE — SOL IRR NACL .9% 3000ML

## (undated) DEVICE — CLIPPER BLADE MOD 4406 (CAREF)

## (undated) DEVICE — SET CYSTO IRRIGATION UNIV SPIK

## (undated) DEVICE — SPONGE GAUZE 16PLY 4X4

## (undated) DEVICE — KIT WING PAD POSITIONING

## (undated) DEVICE — Device

## (undated) DEVICE — SUT 0 V-LOC GR GS-21 TAPER

## (undated) DEVICE — SOL 9P NACL IRR PIC IL

## (undated) DEVICE — CATH FOL IC 3WAY 16F 5CCLF

## (undated) DEVICE — BLADE SURG STAINLESS STEEL #15

## (undated) DEVICE — CONTAINER SPECIMEN STRL 4OZ

## (undated) DEVICE — CATH FOLEY 16F COUNCIL STA LOC

## (undated) DEVICE — POSITIONER HEAD ADULT

## (undated) DEVICE — INSERT CUSHIONPRONE VIEW LARGE

## (undated) DEVICE — SOL CLEARIFY VISUALIZATION LAP

## (undated) DEVICE — SUT VICRYL PLUS 0 CT1 36IN

## (undated) DEVICE — SOL STRL WATER INJ 1000ML BG

## (undated) DEVICE — ADHESIVE DERMABOND ADVANCED

## (undated) DEVICE — SEE MEDLINE ITEM 146347

## (undated) DEVICE — DRESSING TRANS 4X4 TEGADERM

## (undated) DEVICE — SEE MEDLINE ITEM 157110

## (undated) DEVICE — SET TRI-LUMEN FILTERED TUBE

## (undated) DEVICE — CORD BIPOLAR 12 FOOT

## (undated) DEVICE — SEE MEDLINE ITEM 156923

## (undated) DEVICE — PAD PREP 50/CA

## (undated) DEVICE — SET BASIN 48X48IN 6000ML RING

## (undated) DEVICE — SOL NS 1000CC

## (undated) DEVICE — SUT MCRYL PLUS 4-0 PS2 27IN

## (undated) DEVICE — SEE MEDLINE ITEM 152622

## (undated) DEVICE — DEVICE ANC SW STAT FOLEY 6-24

## (undated) DEVICE — NDL BOX COUNTER

## (undated) DEVICE — KIT ROBOTIC 4 ARM DA VINCI SI

## (undated) DEVICE — SUT ETHIBOND XTRA V-7 36IN

## (undated) DEVICE — SCRUB 10% POVIDONE IODINE 4OZ

## (undated) DEVICE — SEE MEDLINE ITEM 146313

## (undated) DEVICE — SEAL CANNULA DA VINCI 12MM

## (undated) DEVICE — DRAPE INCISE IOBAN 2 23X17IN

## (undated) DEVICE — SUT VICRYL CTD 2-0 GI 27 SH

## (undated) DEVICE — ELECTRODE REM PLYHSV RETURN 9

## (undated) DEVICE — UNDERGLOVES BIOGEL PI SZ 7 LF

## (undated) DEVICE — OBTURATOR 8MM BLADELESS

## (undated) DEVICE — GLOVE BIOGEL SKINSENSE PI 7.0

## (undated) DEVICE — SOL WATER STRL IRR 1000ML

## (undated) DEVICE — TIP RUMI BLUE DISPOSABLE 5/BX

## (undated) DEVICE — KIT POWDER ABSORBABLE GELATIN

## (undated) DEVICE — SUT VICRYL 0 27 CT-2

## (undated) DEVICE — PORT ACCESS 8MM W/120MM LOW

## (undated) DEVICE — SYR 10CC LUER LOCK

## (undated) DEVICE — SOL ELECTROLUBE ANTI-STIC

## (undated) DEVICE — IRRIGATOR ENDOSCOPY DISP.

## (undated) DEVICE — OCCLUDER COLPO-PNEUMO STERILE

## (undated) DEVICE — COVER TIP CURVED SCISSORS XI

## (undated) DEVICE — NDL HYPO REG 25G X 1 1/2

## (undated) DEVICE — SEAL UNIVERSAL 5MM-8MM XI

## (undated) DEVICE — NDL INSUF ULTRA VERESS 120MM

## (undated) DEVICE — SUT ETHIBOND EXCEL 0 CT2 30